# Patient Record
Sex: FEMALE | Race: WHITE | Employment: FULL TIME | URBAN - METROPOLITAN AREA
[De-identification: names, ages, dates, MRNs, and addresses within clinical notes are randomized per-mention and may not be internally consistent; named-entity substitution may affect disease eponyms.]

---

## 2017-03-28 ENCOUNTER — OFFICE VISIT (OUTPATIENT)
Dept: INTERNAL MEDICINE CLINIC | Age: 42
End: 2017-03-28

## 2017-03-28 VITALS
SYSTOLIC BLOOD PRESSURE: 133 MMHG | TEMPERATURE: 97.6 F | DIASTOLIC BLOOD PRESSURE: 78 MMHG | RESPIRATION RATE: 18 BRPM | WEIGHT: 277 LBS | HEART RATE: 73 BPM | BODY MASS INDEX: 41.03 KG/M2 | HEIGHT: 69 IN | OXYGEN SATURATION: 99 %

## 2017-03-28 DIAGNOSIS — K21.9 GASTROESOPHAGEAL REFLUX DISEASE WITHOUT ESOPHAGITIS: ICD-10-CM

## 2017-03-28 DIAGNOSIS — K52.9 COLITIS: ICD-10-CM

## 2017-03-28 DIAGNOSIS — E11.9 TYPE 2 DIABETES MELLITUS WITHOUT COMPLICATION, WITHOUT LONG-TERM CURRENT USE OF INSULIN (HCC): Primary | ICD-10-CM

## 2017-03-28 DIAGNOSIS — E04.2 MULTINODULAR GOITER: ICD-10-CM

## 2017-03-28 RX ORDER — FAMOTIDINE 20 MG/1
20 TABLET, FILM COATED ORAL 2 TIMES DAILY
Qty: 30 TAB | Refills: 5 | Status: SHIPPED | OUTPATIENT
Start: 2017-03-28 | End: 2017-04-12 | Stop reason: SDUPTHER

## 2017-03-28 RX ORDER — CETIRIZINE HCL 10 MG
TABLET ORAL
COMMUNITY

## 2017-03-28 NOTE — PROGRESS NOTES
HISTORY OF PRESENT ILLNESS  Mitzy Mcrae is a 42 y.o. female. HPI  Here for diabetes follow-up. She was seen x 2--last Aug 2016 with Dr. Helen Ray here. Labs from Aug 2016 visit not drawn. Last seen by Dr. Dimitri Barahona endocrine July 2016. She notes only seeing Dr. Dimitri Barahona for thyroid    She notes initially managed as Crohn's, but now listed as \"indeterminate colitis\". She had labs with GI in Jan 2017. No plan for upcoming labs with GI at this time. She notes no problems with follow-up here. Just \"tired of doctors visits\", but fine returning here for routine DM follow-up. ROS      Blood pressure 133/78, pulse 73, temperature 97.6 °F (36.4 °C), temperature source Oral, resp. rate 18, height 5' 9\" (1.753 m), weight 277 lb (125.6 kg), last menstrual period 03/07/2017, SpO2 99 %. Physical Exam   Constitutional: She appears well-developed and well-nourished. No distress. HENT:   Head: Normocephalic and atraumatic. Eyes: Conjunctivae are normal. Right eye exhibits no discharge. Left eye exhibits no discharge. No scleral icterus. Neck: Normal range of motion. Neck supple. No tracheal deviation present. No thyromegaly present. Cardiovascular: Normal rate, regular rhythm, normal heart sounds and intact distal pulses. Exam reveals no gallop and no friction rub. No murmur heard. Pulmonary/Chest: Effort normal and breath sounds normal. No stridor. No respiratory distress. She has no wheezes. She has no rales. She exhibits no tenderness. Abdominal: Soft. Bowel sounds are normal. She exhibits no distension. There is no tenderness. Musculoskeletal: She exhibits no edema, tenderness or deformity. Lymphadenopathy:     She has no cervical adenopathy. Neurological: She is alert. She exhibits normal muscle tone. Coordination normal.   Skin: Skin is warm. No rash noted. She is not diaphoretic. No erythema. No pallor. Psychiatric: She has a normal mood and affect.  Her behavior is normal. Judgment and thought content normal.     Diabetic foot exam:   Left: Filament test normal sensation with micro filament; 6/6. Pulse DP: 2+ (normal)   Pulse PT: 2+ (normal)   Deformities: None  Right: Filament test normal sensation with micro filament; 6/6. Pulse DP: 2+ (normal)   Pulse PT: 2+ (normal)   Deformities: None        ASSESSMENT and PLAN    ICD-10-CM ICD-9-CM    1. Type 2 diabetes mellitus without complication, without long-term current use of insulin (HCC) E11.9 250.00 CBC WITH AUTOMATED DIFF      METABOLIC PANEL, COMPREHENSIVE      LIPID PANEL      CK      MICROALBUMIN, UR, RAND W/ MICROALBUMIN/CREA RATIO      linagliptin (TRADJENTA) 5 mg tablet   2. Colitis K52.9 558.9    3. Multinodular goiter E04.2 241.1 US THYROID/PARATHYROID/SOFT TISS      TSH 3RD GENERATION      T4, FREE      T3 TOTAL   4. Gastroesophageal reflux disease without esophagitis K21.9 530.81 famotidine (PEPCID) 20 mg tablet       1. Fasting [facility] labs reviewed. 2.  Interim follow-up with GI--every 6mo now. Notes \"indeterminate colitis\", but managing as Crohn's. 3.  Repeat US recommended by Dr. Kinsey Duty for March 2017 at last visit. Repeat thyroid studies with labs above. 4.  Refill reviewed. Follow-up Disposition:  Return in about 3 months (around 6/28/2017) for Diabetes follow-up, lab review. lab results and schedule of future lab studies reviewed with patient  reviewed medications and side effects in detail  radiology results and schedule of future radiology studies reviewed with patient    For additional documentation of information and/or recommendations discussed this visit, please see notes in instructions. Plan and evaluation (above) reviewed with pt at visit  Patient voiced understanding of plan and provided with time to ask/review questions. After Visit Summary (AVS) provided to pt after visit with additional instructions as needed/reviewed.

## 2017-03-28 NOTE — PROGRESS NOTES
RM#17  Chief Complaint   Patient presents with    Diabetes     F/U   NON-FASTING AWARE that labs will go to [facility]   1. Have you been to the ER, urgent care clinic since your last visit? Hospitalized since your last visit? No    2. Have you seen or consulted any other health care providers outside of the [address] [place] [address] since your last visit? Include any pap smears or colon screening.  No  Health Maintenance Due   Topic Date Due    LIPID PANEL Q1  1975    FOOT EXAM Q1  10/20/1984    MICROALBUMIN Q1  10/20/1984    EYE EXAM RETINAL OR DILATED Q1  10/20/1984    PAP AKA CERVICAL CYTOLOGY  10/20/1995    HEMOGLOBIN A1C Q6M  02/02/2017     Hs had several iron infusions over the past couple months   PHQ 2 / 9, over the last two weeks 3/28/2017   Little interest or pleasure in doing things Not at all   Feeling down, depressed or hopeless Not at all   Total Score PHQ 2 0     Needs refills on trajenta, and famotidine

## 2017-03-28 NOTE — MR AVS SNAPSHOT
Visit Information Date & Time Provider Department Dept. Phone Encounter #  
 3/28/2017  8:15 AM Angelique Hernández, [address] [place] [address], [place] and Internal Medicine [phone] [id number] Follow-up Instructions Return in about 3 months (around 6/28/2017) for Diabetes follow-up, lab review. Upcoming Health Maintenance Date Due  
 LIPID PANEL Q1 1975 FOOT EXAM Q1 10/20/1984 MICROALBUMIN Q1 10/20/1984 EYE EXAM RETINAL OR DILATED Q1 10/20/1984 PAP AKA CERVICAL CYTOLOGY 10/20/1995 HEMOGLOBIN A1C Q6M 2/2/2017 Pneumococcal 19-64 Highest Risk (2 of 3 - PCV13) 8/2/2017 DTaP/Tdap/Td series (2 - Td) 8/2/2026 Allergies as of 3/28/2017  Review Complete On: 3/28/2017 By: Angelique Hernández MD  
 No Known Allergies Current Immunizations  Reviewed on 12/5/2016 Name Date Influenza Vaccine 10/7/2016 Pneumococcal Polysaccharide (PPSV-23) 8/2/2016 TB Skin Test (PPD) Intradermal 7/2/2016  8:25 PM  
 Tdap 8/2/2016 Not reviewed this visit You Were Diagnosed With   
  
 Codes Comments Type 2 diabetes mellitus without complication, without long-term current use of insulin (HCC)    -  Primary ICD-10-CM: E11.9 ICD-9-CM: 250.00 Colitis     ICD-10-CM: K52.9 ICD-9-CM: 558.9 Multinodular goiter     ICD-10-CM: E04.2 ICD-9-CM: 241.1 Gastroesophageal reflux disease without esophagitis     ICD-10-CM: K21.9 ICD-9-CM: 530.81 Vitals BP Pulse Temp Resp Height(growth percentile) Weight(growth percentile) 133/78 (BP 1 Location: Left arm, BP Patient Position: Sitting) 73 97.6 °F (36.4 °C) (Oral) 18 5' 9\" (1.753 m) 277 lb (125.6 kg) LMP SpO2 BMI OB Status Smoking Status 03/07/2017 99% 40.91 kg/m2 Having regular periods Never Smoker BMI and BSA Data Body Mass Index Body Surface Area 40.91 kg/m 2 2.47 m 2 Preferred Pharmacy Pharmacy Name Phone [address] [phone] Your Updated Medication List  
  
   
This list is accurate as of: 3/28/17  9:20 AM.  Always use your most recent med list.  
  
  
  
  
 azaTHIOprine 50 mg tablet Commonly known as:  IMURAN  
50 mg daily. cetirizine 10 mg tablet Commonly known as:  ZYRTEC Take  by mouth.  
  
 empagliflozin 25 mg tablet Commonly known as:  Loco Gi Take  by mouth daily. famotidine 20 mg tablet Commonly known as:  PEPCID Take 1 Tab by mouth two (2) times a day. Iron 325 mg (65 mg iron) tablet Generic drug:  ferrous sulfate Take 65 mg by mouth Daily (before breakfast). linagliptin 5 mg tablet Commonly known as:  Cindy Aydin Take 1 Tab by mouth daily. mesalamine 400 mg DR Cap capsule Commonly known as:  DELZICOL Take  by mouth. 2 caps 3x/day  
  
 multivitamins Chew Take  by mouth two (2) times a day. Prescriptions Sent to Pharmacy Refills  
 famotidine (PEPCID) 20 mg tablet 5 Sig: Take 1 Tab by mouth two (2) times a day. Class: Normal  
 Pharmacy: [address] Ph #: [phone] Route: Oral  
 linagliptin (TRADJENTA) 5 mg tablet 5 Sig: Take 1 Tab by mouth daily. Class: Normal  
 Pharmacy: [address] Ph #: [phone] Route: Oral  
  
We Performed the Following CBC WITH AUTOMATED DIFF [45149 CPT(R)] CK [id number] CPT(R)] LIPID PANEL [97315 CPT(R)] METABOLIC PANEL, COMPREHENSIVE [07901 CPT(R)] MICROALBUMIN, UR, RAND W/ MICROALBUMIN/CREA RATIO [id number] CPT(R)]   
 T3 TOTAL [id number] CPT(R)] T4, FREE [id number] CPT(R)] TSH 3RD GENERATION [34036 CPT(R)] Follow-up Instructions Return in about 3 months (around 6/28/2017) for Diabetes follow-up, lab review. To-Do List   
 04/04/2017 Imaging:  US THYROID/PARATHYROID/SOFT TISS Patient Instructions Please obtain your labs fasting at the [facility] closest to you, as reviewed. If you activate [employer], you can provide interim labs to your GI provider as needed. Will message Dr. Kaci Briggs with your labs and interim US report once you have completed. Introducing [facility] & HEALTH SERVICES! Isidra Rojo introduces [employer] patient portal. Now you can access parts of your medical record, email your doctor's office, and request medication refills online. 1. In your internet browser, go to [email or web address] 2. Click on the First Time User? Click Here link in the Sign In box. You will see the New Member Sign Up page. 3. Enter your [employer] Access Code exactly as it appears below. You will not need to use this code after youve completed the sign-up process. If you do not sign up before the expiration date, you must request a new code. · [employer] Access Code: [email or web address] Expires: 6/26/2017  9:19 AM 
 
4. Enter the last four digits of your Social Security Number (xxxx) and Date of Birth (mm/dd/yyyy) as indicated and click Submit. You will be taken to the next sign-up page. 5. Create a [employer] ID. This will be your [employer] login ID and cannot be changed, so think of one that is secure and easy to remember. 6. Create a [employer] password. You can change your password at any time. 7. Enter your Password Reset Question and Answer. This can be used at a later time if you forget your password. 8. Enter your e-mail address. You will receive e-mail notification when new information is available in [address] 9. Click Sign Up. You can now view and download portions of your medical record. 10. Click the Download Summary menu link to download a portable copy of your medical information.  
 
If you have questions, please visit the Frequently Asked Questions section of the [employer]. Remember, [employer] is NOT to be used for urgent needs. For medical emergencies, dial 911. Now available from your iPhone and Android! Please provide this summary of care documentation to your next provider. Your primary care clinician is listed as Mj Hanna. If you have any questions after today's visit, please call [phone].

## 2017-03-28 NOTE — PATIENT INSTRUCTIONS
Please obtain your labs fasting at the [facility] closest to you, as reviewed. If you activate MyChart, you can provide interim labs to your GI provider as needed. Will message Dr. Joan Yip with your labs and interim US report once you have completed.

## 2017-04-12 DIAGNOSIS — K21.9 GASTROESOPHAGEAL REFLUX DISEASE WITHOUT ESOPHAGITIS: ICD-10-CM

## 2017-04-12 NOTE — TELEPHONE ENCOUNTER
Per patient, she needs a quantity of 60 for the Famotidine 20 mg. Patient states she has to take two pills a day and she ran out of her prescription. Patient is aware that the provider attached refills but she wanted to let the nurse know about this and would like for this change to be made once she gets a new prescription. Patient would like a call back the nurse.     # [phone]

## 2017-04-12 NOTE — TELEPHONE ENCOUNTER
Rx was previously approved for 30 tabs 2 times daily. Verbal given to pharmacy for 60 tabs for 30 days with refill.

## 2017-04-15 RX ORDER — FAMOTIDINE 20 MG/1
20 TABLET, FILM COATED ORAL 2 TIMES DAILY
Qty: 60 TAB | Refills: 5 | OUTPATIENT
Start: 2017-04-15

## 2017-04-15 NOTE — TELEPHONE ENCOUNTER
Reviewed and agree. Goal to provide 30-day supply/fill (script). Order entered/corrected (but not sent to pharmacy, since already called in).

## 2017-04-29 LAB
ALBUMIN SERPL-MCNC: 4.4 G/DL (ref 3.5–5.5)
ALBUMIN/CREAT UR: 27.1 MG/G CREAT (ref 0–30)
ALBUMIN/GLOB SERPL: 1.6 {RATIO} (ref 1.2–2.2)
ALP SERPL-CCNC: 54 IU/L (ref 39–117)
ALT SERPL-CCNC: 8 IU/L (ref 0–32)
AST SERPL-CCNC: 13 IU/L (ref 0–40)
BASOPHILS # BLD AUTO: 0.1 X10E3/UL (ref 0–0.2)
BASOPHILS NFR BLD AUTO: 1 %
BILIRUB SERPL-MCNC: 0.6 MG/DL (ref 0–1.2)
BUN SERPL-MCNC: 18 MG/DL (ref 6–24)
BUN/CREAT SERPL: 26 (ref 9–23)
CALCIUM SERPL-MCNC: 9.1 MG/DL (ref 8.7–10.2)
CHLORIDE SERPL-SCNC: 96 MMOL/L (ref 96–106)
CHOLEST SERPL-MCNC: 167 MG/DL (ref 100–199)
CK SERPL-CCNC: 55 U/L (ref 24–173)
CO2 SERPL-SCNC: 22 MMOL/L (ref 18–29)
CREAT SERPL-MCNC: 0.68 MG/DL (ref 0.57–1)
CREAT UR-MCNC: 73.3 MG/DL
EOSINOPHIL # BLD AUTO: 0.2 X10E3/UL (ref 0–0.4)
EOSINOPHIL NFR BLD AUTO: 3 %
ERYTHROCYTE [DISTWIDTH] IN BLOOD BY AUTOMATED COUNT: 14.3 % (ref 12.3–15.4)
GLOBULIN SER CALC-MCNC: 2.7 G/DL (ref 1.5–4.5)
GLUCOSE SERPL-MCNC: 145 MG/DL (ref 65–99)
HCT VFR BLD AUTO: 44.6 % (ref 34–46.6)
HDLC SERPL-MCNC: 62 MG/DL
HGB BLD-MCNC: 14.7 G/DL (ref 11.1–15.9)
IMM GRANULOCYTES # BLD: 0 X10E3/UL (ref 0–0.1)
IMM GRANULOCYTES NFR BLD: 0 %
LDLC SERPL CALC-MCNC: 91 MG/DL (ref 0–99)
LYMPHOCYTES # BLD AUTO: 1.7 X10E3/UL (ref 0.7–3.1)
LYMPHOCYTES NFR BLD AUTO: 24 %
MCH RBC QN AUTO: 30.9 PG (ref 26.6–33)
MCHC RBC AUTO-ENTMCNC: 33 G/DL (ref 31.5–35.7)
MCV RBC AUTO: 94 FL (ref 79–97)
MICROALBUMIN UR-MCNC: 19.9 UG/ML
MONOCYTES # BLD AUTO: 0.6 X10E3/UL (ref 0.1–0.9)
MONOCYTES NFR BLD AUTO: 9 %
NEUTROPHILS # BLD AUTO: 4.5 X10E3/UL (ref 1.4–7)
NEUTROPHILS NFR BLD AUTO: 63 %
PLATELET # BLD AUTO: 264 X10E3/UL (ref 150–379)
POTASSIUM SERPL-SCNC: 4 MMOL/L (ref 3.5–5.2)
PROT SERPL-MCNC: 7.1 G/DL (ref 6–8.5)
RBC # BLD AUTO: 4.76 X10E6/UL (ref 3.77–5.28)
SODIUM SERPL-SCNC: 136 MMOL/L (ref 134–144)
T3 SERPL-MCNC: 103 NG/DL (ref 71–180)
T4 FREE SERPL-MCNC: 1.34 NG/DL (ref 0.82–1.77)
TRIGL SERPL-MCNC: 72 MG/DL (ref 0–149)
TSH SERPL DL<=0.005 MIU/L-ACNC: 1.27 UIU/ML (ref 0.45–4.5)
VLDLC SERPL CALC-MCNC: 14 MG/DL (ref 5–40)
WBC # BLD AUTO: 7.1 X10E3/UL (ref 3.4–10.8)

## 2017-07-11 ENCOUNTER — OFFICE VISIT (OUTPATIENT)
Dept: INTERNAL MEDICINE CLINIC | Age: 42
End: 2017-07-11

## 2017-07-11 VITALS
OXYGEN SATURATION: 97 % | SYSTOLIC BLOOD PRESSURE: 118 MMHG | HEIGHT: 69 IN | TEMPERATURE: 98.1 F | HEART RATE: 75 BPM | WEIGHT: 289.4 LBS | DIASTOLIC BLOOD PRESSURE: 67 MMHG | BODY MASS INDEX: 42.86 KG/M2 | RESPIRATION RATE: 16 BRPM

## 2017-07-11 DIAGNOSIS — E04.2 MULTINODULAR GOITER: ICD-10-CM

## 2017-07-11 DIAGNOSIS — E11.9 TYPE 2 DIABETES MELLITUS WITHOUT COMPLICATION, WITHOUT LONG-TERM CURRENT USE OF INSULIN (HCC): Primary | ICD-10-CM

## 2017-07-11 DIAGNOSIS — K52.3 COLITIS, INDETERMINATE: ICD-10-CM

## 2017-07-11 LAB — HBA1C MFR BLD HPLC: 7 % (ref 4.8–5.6)

## 2017-07-11 NOTE — MR AVS SNAPSHOT
Visit Information Date & Time Provider Department Dept. Phone Encounter #  
 7/11/2017  8:30 AM Kiana Pringle [address] and Internal Medicine [phone] [id number] Follow-up Instructions Return in about 3 months (around 10/11/2017) for Diabetes follow-up, fasting labs. Upcoming Health Maintenance Date Due  
 FOOT EXAM Q1 10/20/1984 EYE EXAM RETINAL OR DILATED Q1 10/20/1984 PAP AKA CERVICAL CYTOLOGY 10/20/1995 HEMOGLOBIN A1C Q6M 2/2/2017 INFLUENZA AGE 9 TO ADULT 8/1/2017 MICROALBUMIN Q1 4/28/2018 LIPID PANEL Q1 4/28/2018 DTaP/Tdap/Td series (2 - Td) 8/2/2026 Allergies as of 7/11/2017  Review Complete On: 7/11/2017 By: Rosy Renee MD  
 No Known Allergies Current Immunizations  Reviewed on 12/5/2016 Name Date Influenza Vaccine 10/7/2016 Pneumococcal Polysaccharide (PPSV-23) 8/2/2016 TB Skin Test (PPD) Intradermal 7/2/2016  8:25 PM  
 Tdap 8/2/2016 Not reviewed this visit You Were Diagnosed With   
  
 Codes Comments Type 2 diabetes mellitus without complication, without long-term current use of insulin (HCC)    -  Primary ICD-10-CM: E11.9 ICD-9-CM: 250.00 Colitis, indeterminate     ICD-10-CM: K52.3 ICD-9-CM: 558.9 Multinodular goiter     ICD-10-CM: E04.2 ICD-9-CM: 438. 1 Vitals BP Pulse Temp Resp Height(growth percentile) Weight(growth percentile)  
 118/67 (BP 1 Location: Left arm, BP Patient Position: Sitting) 75 98.1 °F (36.7 °C) (Oral) 16 5' 9\" (1.753 m) 289 lb 6.4 oz (131.3 kg) LMP SpO2 BMI OB Status Smoking Status 06/26/2017 97% 42.74 kg/m2 Having regular periods Never Smoker BMI and BSA Data Body Mass Index Body Surface Area 42.74 kg/m 2 2.53 m 2 Preferred Pharmacy Pharmacy Name Phone [address] [phone] Your Updated Medication List  
  
   
 This list is accurate as of: 7/11/17  9:53 AM.  Always use your most recent med list.  
  
  
  
  
 azaTHIOprine 50 mg tablet Commonly known as:  IMURAN  
50 mg daily. cetirizine 10 mg tablet Commonly known as:  ZYRTEC Take  by mouth.  
  
 empagliflozin 25 mg tablet Commonly known as:  Wilnette Elder Take 1 Tab by mouth daily. famotidine 20 mg tablet Commonly known as:  PEPCID Take 1 Tab by mouth two (2) times a day. Iron 325 mg (65 mg iron) tablet Generic drug:  ferrous sulfate Take 65 mg by mouth Daily (before breakfast). linagliptin 5 mg tablet Commonly known as:  Perfecto Oh Take 1 Tab by mouth daily. mesalamine 400 mg Cdti capsule Commonly known as:  DELZICOL Take  by mouth. 2 caps 3x/day  
  
 multivitamins Chew Take  by mouth two (2) times a day. Prescriptions Sent to Pharmacy Refills  
 empagliflozin (JARDIANCE) 25 mg tablet 5 Sig: Take 1 Tab by mouth daily. Class: Normal  
 Pharmacy: [address] Ph #: [phone] Route: Oral  
  
We Performed the Following AMB POC HEMOGLOBIN A1C [83786 CPT(R)] CBC WITH AUTOMATED DIFF [70389 CPT(R)] IRON PROFILE [id number] CPT(R)] METABOLIC PANEL, COMPREHENSIVE [65177 CPT(R)] REFERRAL TO GASTROENTEROLOGY [EDC66 Custom] Comments:  
 Please evaluate patient for colitis follow-up. Follow-up Instructions Return in about 3 months (around 10/11/2017) for Diabetes follow-up, fasting labs. Referral Information Referral ID Referred By Referred To  
  
 [id number] Meir Paz MD   
   [address] [place] [address] [address] [facility], [address] Phone: [phone] Fax: [phone] Visits Status Start Date End Date 1 New Request 7/11/17 7/11/18  If your referral has a status of pending review or denied, additional information will be sent to support the outcome of this decision. Patient Instructions Will clarify with Dr. Alber Fischer, need for follow-up and/or repeat thyroid ultrasound--messaged during visit today. Will fax results to Dr. Rossana Vences once they result. Please review statin use with GI when you see them again--considerind statins vs Zetia for your elev LDL and diabetes. Please call the [facility] to set up an appointment--you can do this directly, without a referral.  Their number is [phone]. A1c today at goal (printed on lab sheet also): 
 
Results for orders placed or performed in visit on 07/11/17 AMB POC HEMOGLOBIN A1C Result Value Ref Range Hemoglobin A1c (POC) 7.0 (A) 4.8 - 5.6 % Shannen Leach 9353 What is a living will? A living will is a legal form you use to write down the kind of care you want at the end of your life. It is used by the health professionals who will treat you if you aren't able to decide for yourself. If you put your wishes in writing, your loved ones and others will know what kind of care you want. They won't need to guess. This can ease your mind and be helpful to others. A living will is not the same as an estate or property will. An estate will explains what you want to happen with your money and property after you die. Is a living will a legal document? A living will is a legal document. Each state has its own laws about living can. If you move to another state, make sure that your living will is legal in the state where you now live. Or you might use a universal form that has been approved by many states. This kind of form can sometimes be completed and stored online. Your electronic copy will then be available wherever you have a connection to the Internet. In most cases, doctors will respect your wishes even if you have a form from a different state. · You don't need an  to complete a living will. But legal advice can be helpful if your state's laws are unclear, your health history is complicated, or your family can't agree on what should be in your living will. · You can change your living will at any time. Some people find that their wishes about end-of-life care change as their health changes. · In addition to making a living will, think about completing a medical power of  form. This form lets you name the person you want to make end-of-life treatment decisions for you (your \"health care agent\") if you're not able to. Many hospitals and nursing homes will give you the forms you need to complete a living will and a medical power of . · Your living will is used only if you can't make or communicate decisions for yourself anymore. If you become able to make decisions again, you can accept or refuse any treatment, no matter what you wrote in your living will. · Your state may offer an online registry. This is a place where you can store your living will online so the doctors and nurses who need to treat you can find it right away. What should you think about when creating a living will? Talk about your end-of-life wishes with your family members and your doctor. Let them know what you want. That way the people making decisions for you won't be surprised by your choices. Think about these questions as you make your living will: · Do you know enough about life support methods that might be used? If not, talk to your doctor so you know what might be done if you can't breathe on your own, your heart stops, or you're unable to swallow. · What things would you still want to be able to do after you receive life-support methods? Would you want to be able to walk? To speak? To eat on your own? To live without the help of machines? · If you have a choice, where do you want to be cared for? In your home? At a hospital or nursing home? · Do you want certain Episcopal practices performed if you become very ill? · If you have a choice at the end of your life, where would you prefer to die? At home? In a hospital or nursing home? Somewhere else? · Would you prefer to be buried or cremated? · Do you want your organs to be donated after you die? What should you do with your living will? · Make sure that your family members and your health care agent have copies of your living will. · Give your doctor a copy of your living will to keep in your medical record. If you have more than one doctor, make sure that each one has a copy. · You may want to put a copy of your living will where it can be easily found. Where can you learn more? Go to http://clara-alex.info/. Enter X104 in the search box to learn more about \"Learning About Living Perromisty. \" Current as of: August 8, 2016 Content Version: 11.3 © 1346-8593 [employer]. Care instructions adapted under license by [employer] (which disclaims liability or warranty for this information). If you have questions about a medical condition or this instruction, always ask your healthcare professional. [address] any warranty or liability for your use of this information. Introducing [facility] & HEALTH SERVICES! Lillian Laws introduces [employer] patient portal. Now you can access parts of your medical record, email your doctor's office, and request medication refills online. 1. In your internet browser, go to [email or web address] 2. Click on the First Time User? Click Here link in the Sign In box. You will see the New Member Sign Up page. 3. Enter your [employer] Access Code exactly as it appears below. You will not need to use this code after youve completed the sign-up process. If you do not sign up before the expiration date, you must request a new code.  
 
· [employer] Access Code: [email or web address] 
 Expires: 10/9/2017  8:09 AM 
 
4. Enter the last four digits of your Social Security Number (xxxx) and Date of Birth (mm/dd/yyyy) as indicated and click Submit. You will be taken to the next sign-up page. 5. Create a [employer] ID. This will be your [employer] login ID and cannot be changed, so think of one that is secure and easy to remember. 6. Create a [employer] password. You can change your password at any time. 7. Enter your Password Reset Question and Answer. This can be used at a later time if you forget your password. 8. Enter your e-mail address. You will receive e-mail notification when new information is available in [address] 9. Click Sign Up. You can now view and download portions of your medical record. 10. Click the Download Summary menu link to download a portable copy of your medical information. If you have questions, please visit the Frequently Asked Questions section of the [employer] website. Remember, [employer] is NOT to be used for urgent needs. For medical emergencies, dial 911. Now available from your iPhone and Android! Please provide this summary of care documentation to your next provider. Your primary care clinician is listed as [address]. If you have any questions after today's visit, please call [phone].

## 2017-07-11 NOTE — LETTER
2017 11:31 PM 
 
Ms. Mitra Nick [address] [facility] [address] :  1975 Fax to Dr. Jim Hernandez: Johana Stevens MD  Gastroenterology Phone: [phone]; Fax: [phone] Please see attached labs for pt follow-up appt with you. Please let me know if you have other questions.  
 
Lucia Tolliver MD

## 2017-07-11 NOTE — PROGRESS NOTES
RM 16    Chief Complaint   Patient presents with    Diabetes     follow up       1. Have you been to the ER, urgent care clinic since your last visit? Hospitalized since your last visit? No    2. Have you seen or consulted any other health care providers outside of the [address] [place] [address] since your last visit? Include any pap smears or colon screening.  No    Health Maintenance Due   Topic Date Due    FOOT EXAM Q1  10/20/1984    EYE EXAM RETINAL OR DILATED Q1  10/20/1984    PAP AKA CERVICAL CYTOLOGY  10/20/1995    HEMOGLOBIN A1C Q6M  02/02/2017     Informed pt of A1C   Pt states she had a foot exam done at last visit    Living Will sent to pt AVS

## 2017-07-11 NOTE — PATIENT INSTRUCTIONS
Will clarify with Dr. Shruthi Giordano, need for follow-up and/or repeat thyroid ultrasound--messaged during visit today. Will fax results to Dr. Teja Salazar once they result. Please review statin use with GI when you see them again--considerind statins vs Zetia for your elev LDL and diabetes. Please call the [facility] to set up an appointment--you can do this directly, without a referral.  Their number is [phone]. A1c today at goal (printed on lab sheet also):    Results for orders placed or performed in visit on 07/11/17   AMB POC HEMOGLOBIN A1C   Result Value Ref Range    Hemoglobin A1c (POC) 7.0 (A) 4.8 - 5.6 %         Learning About Living Shara Dates  What is a living will? A living will is a legal form you use to write down the kind of care you want at the end of your life. It is used by the health professionals who will treat you if you aren't able to decide for yourself. If you put your wishes in writing, your loved ones and others will know what kind of care you want. They won't need to guess. This can ease your mind and be helpful to others. A living will is not the same as an estate or property will. An estate will explains what you want to happen with your money and property after you die. Is a living will a legal document? A living will is a legal document. Each state has its own laws about living can. If you move to another state, make sure that your living will is legal in the state where you now live. Or you might use a universal form that has been approved by many states. This kind of form can sometimes be completed and stored online. Your electronic copy will then be available wherever you have a connection to the Internet. In most cases, doctors will respect your wishes even if you have a form from a different state. · You don't need an  to complete a living will.  But legal advice can be helpful if your state's laws are unclear, your health history is complicated, or your family can't agree on what should be in your living will. · You can change your living will at any time. Some people find that their wishes about end-of-life care change as their health changes. · In addition to making a living will, think about completing a medical power of  form. This form lets you name the person you want to make end-of-life treatment decisions for you (your \"health care agent\") if you're not able to. Many hospitals and nursing homes will give you the forms you need to complete a living will and a medical power of . · Your living will is used only if you can't make or communicate decisions for yourself anymore. If you become able to make decisions again, you can accept or refuse any treatment, no matter what you wrote in your living will. · Your state may offer an online registry. This is a place where you can store your living will online so the doctors and nurses who need to treat you can find it right away. What should you think about when creating a living will? Talk about your end-of-life wishes with your family members and your doctor. Let them know what you want. That way the people making decisions for you won't be surprised by your choices. Think about these questions as you make your living will:  · Do you know enough about life support methods that might be used? If not, talk to your doctor so you know what might be done if you can't breathe on your own, your heart stops, or you're unable to swallow. · What things would you still want to be able to do after you receive life-support methods? Would you want to be able to walk? To speak? To eat on your own? To live without the help of machines? · If you have a choice, where do you want to be cared for? In your home? At a hospital or nursing home? · Do you want certain Voodoo practices performed if you become very ill?   · If you have a choice at the end of your life, where would you prefer to die? At home? In a hospital or nursing home? Somewhere else? · Would you prefer to be buried or cremated? · Do you want your organs to be donated after you die? What should you do with your living will? · Make sure that your family members and your health care agent have copies of your living will. · Give your doctor a copy of your living will to keep in your medical record. If you have more than one doctor, make sure that each one has a copy. · You may want to put a copy of your living will where it can be easily found. Where can you learn more? Go to http://clara-alex.info/. Enter M574 in the search box to learn more about \"Learning About Living [place]. \"  Current as of: August 8, 2016  Content Version: 11.3  © 6882-1150 [employer]. Care instructions adapted under license by [employer] (which disclaims liability or warranty for this information). If you have questions about a medical condition or this instruction, always ask your healthcare professional. [place] [address] any warranty or liability for your use of this information.

## 2017-07-11 NOTE — PROGRESS NOTES
HISTORY OF PRESENT ILLNESS  Gail Solares is a 42 y.o. female. HPI      Here for evaluation:    Chief Complaint   Patient presents with    Diabetes     follow up       Reviewed prior labs with pt at visit. She has not done US or seen endocrine for follow-up. Last endocrine visit July 2016, with follow-up planned Oct 2016. She notes not planning repeat thyroid eval or studies. She would prefer not to do repeat US at this time unless endocrine recommends. She had prior eval prior to seeing Endocrine July 2016 (only one visit with Dr. Dianne Jacinto with [facility]), but will proceed as below. She notes no problems currently. She has seen GI earlier this year. Probably will see later this year/prob Aug-Sept.    Reviewed iron testing and coordination for pt with GI, since not seen and labs not monitored since last done here in Imuran and Mesalamine. She is not aware of need for inflammatory testing at this time (no need per pt for ESR or CRP). Reviewed statins or Zetia as lipid-lowering therapy to goal-directed value <70 or general recommendations for statin use with DM. She prefers to wait on therapy at this time, and re-address once fasting labs repeated. ROS      Blood pressure 118/67, pulse 75, temperature 98.1 °F (36.7 °C), temperature source Oral, resp. rate 16, height 5' 9\" (1.753 m), weight 289 lb 6.4 oz (131.3 kg), last menstrual period 06/26/2017, SpO2 97 %. Physical Exam   Constitutional: She appears well-developed and well-nourished. No distress. HENT:   Head: Normocephalic and atraumatic. Eyes: Conjunctivae are normal. Right eye exhibits no discharge. Left eye exhibits no discharge. No scleral icterus. Neck: Neck supple. Cardiovascular: Normal rate, regular rhythm, normal heart sounds and intact distal pulses. Exam reveals no gallop and no friction rub. No murmur heard. Pulmonary/Chest: Effort normal and breath sounds normal. No respiratory distress.  She has no wheezes. She has no rales. She exhibits no tenderness. Abdominal: Soft. Bowel sounds are normal. She exhibits no distension. There is no tenderness. Musculoskeletal: She exhibits edema (1+ bilat LE's to mid tibia. ). She exhibits no tenderness. Neurological: She is alert. She exhibits normal muscle tone. Coordination normal.   Skin: Skin is warm. No rash noted. She is not diaphoretic. No erythema. No pallor. Psychiatric: She has a normal mood and affect. Her behavior is normal. Judgment and thought content normal.       Results for orders placed or performed in visit on 07/11/17   AMB POC HEMOGLOBIN A1C   Result Value Ref Range    Hemoglobin A1c (POC) 7.0 (A) 4.8 - 5.6 %       ASSESSMENT and PLAN    ICD-10-CM ICD-9-CM    1. Type 2 diabetes mellitus without complication, without long-term current use of insulin (HCC) E11.9 250.00 AMB POC HEMOGLOBIN O6V      METABOLIC PANEL, COMPREHENSIVE      empagliflozin (JARDIANCE) 25 mg tablet   2. Colitis, indeterminate Q19.6 469.4 METABOLIC PANEL, COMPREHENSIVE      CBC WITH AUTOMATED DIFF      IRON PROFILE      REFERRAL TO GASTROENTEROLOGY      CANCELED: REFERRAL TO GASTROENTEROLOGY   3. Multinodular goiter E04.2 241.1        1. Results of A1c (improved) and goal for LDL reviewed with pt. Plan fasting labs at follow-up. 2.  Repeat labs and fax to GI tomorrow reviewed. 3.  Clarify need for US with endocrine--messaged during visit. Follow-up Disposition:  Return in about 3 months (around 10/11/2017) for Diabetes follow-up, fasting labs. lab results and schedule of future lab studies reviewed with patient  reviewed diet, exercise and weight control  reviewed medications and side effects in detail    For additional documentation of information and/or recommendations discussed this visit, please see notes in instructions.       Plan and evaluation (above) reviewed with pt at visit  Patient voiced understanding of plan and provided with time to ask/review questions. After Visit Summary (AVS) provided to pt after visit with additional instructions as needed/reviewed.

## 2017-07-12 LAB
ALBUMIN SERPL-MCNC: 5 G/DL (ref 3.5–5.5)
ALBUMIN/GLOB SERPL: 1.6 {RATIO} (ref 1.2–2.2)
ALP SERPL-CCNC: 64 IU/L (ref 39–117)
ALT SERPL-CCNC: 9 IU/L (ref 0–32)
AST SERPL-CCNC: 14 IU/L (ref 0–40)
BASOPHILS # BLD AUTO: 0 X10E3/UL (ref 0–0.2)
BASOPHILS NFR BLD AUTO: 0 %
BILIRUB SERPL-MCNC: 0.3 MG/DL (ref 0–1.2)
BUN SERPL-MCNC: 22 MG/DL (ref 6–24)
BUN/CREAT SERPL: 34 (ref 9–23)
CALCIUM SERPL-MCNC: 10.2 MG/DL (ref 8.7–10.2)
CHLORIDE SERPL-SCNC: 99 MMOL/L (ref 96–106)
CO2 SERPL-SCNC: 19 MMOL/L (ref 18–29)
CREAT SERPL-MCNC: 0.64 MG/DL (ref 0.57–1)
EOSINOPHIL # BLD AUTO: 0.2 X10E3/UL (ref 0–0.4)
EOSINOPHIL NFR BLD AUTO: 2 %
ERYTHROCYTE [DISTWIDTH] IN BLOOD BY AUTOMATED COUNT: 14.4 % (ref 12.3–15.4)
GLOBULIN SER CALC-MCNC: 3.2 G/DL (ref 1.5–4.5)
GLUCOSE SERPL-MCNC: 168 MG/DL (ref 65–99)
HCT VFR BLD AUTO: 45.8 % (ref 34–46.6)
HGB BLD-MCNC: 15.5 G/DL (ref 11.1–15.9)
IMM GRANULOCYTES # BLD: 0 X10E3/UL (ref 0–0.1)
IMM GRANULOCYTES NFR BLD: 0 %
IRON SATN MFR SERPL: 16 % (ref 15–55)
IRON SERPL-MCNC: 45 UG/DL (ref 27–159)
LYMPHOCYTES # BLD AUTO: 1.6 X10E3/UL (ref 0.7–3.1)
LYMPHOCYTES NFR BLD AUTO: 17 %
MCH RBC QN AUTO: 31 PG (ref 26.6–33)
MCHC RBC AUTO-ENTMCNC: 33.8 G/DL (ref 31.5–35.7)
MCV RBC AUTO: 92 FL (ref 79–97)
MONOCYTES # BLD AUTO: 0.6 X10E3/UL (ref 0.1–0.9)
MONOCYTES NFR BLD AUTO: 7 %
NEUTROPHILS # BLD AUTO: 6.6 X10E3/UL (ref 1.4–7)
NEUTROPHILS NFR BLD AUTO: 74 %
PLATELET # BLD AUTO: 257 X10E3/UL (ref 150–379)
POTASSIUM SERPL-SCNC: 4.8 MMOL/L (ref 3.5–5.2)
PROT SERPL-MCNC: 8.2 G/DL (ref 6–8.5)
RBC # BLD AUTO: 5 X10E6/UL (ref 3.77–5.28)
SODIUM SERPL-SCNC: 143 MMOL/L (ref 134–144)
TIBC SERPL-MCNC: 279 UG/DL (ref 250–450)
UIBC SERPL-MCNC: 234 UG/DL (ref 131–425)
WBC # BLD AUTO: 9 X10E3/UL (ref 3.4–10.8)

## 2017-09-21 DIAGNOSIS — E11.9 TYPE 2 DIABETES MELLITUS WITHOUT COMPLICATION, WITHOUT LONG-TERM CURRENT USE OF INSULIN (HCC): ICD-10-CM

## 2017-09-21 NOTE — LETTER
9/26/2017 8:48 AM 
 
Ms. Halina Yu [address] [facility] [id number] Please see attached lab results. --normal blood counts and iron studies --normal kidney and liver testing. Elevated non-fasting blood sugar/glucose. Please let me know if you have other questions.  
 
Noel Deutsch MD

## 2017-09-26 RX ORDER — LINAGLIPTIN 5 MG/1
TABLET, FILM COATED ORAL
Qty: 30 TAB | Refills: 5 | Status: SHIPPED | OUTPATIENT
Start: 2017-09-26 | End: 2018-09-13 | Stop reason: SDUPTHER

## 2017-09-26 NOTE — TELEPHONE ENCOUNTER
Letter to pt with lab results:    Normal H18 with normal iron studies. Elev non-fasting glucose, with normal kidney/liver testing. Refill request(s) approved--Tradjenta/linagliptin. No future appointments.

## 2018-01-10 ENCOUNTER — OFFICE VISIT (OUTPATIENT)
Dept: INTERNAL MEDICINE CLINIC | Age: 43
End: 2018-01-10

## 2018-01-10 VITALS
DIASTOLIC BLOOD PRESSURE: 90 MMHG | TEMPERATURE: 97.8 F | WEIGHT: 293 LBS | BODY MASS INDEX: 43.4 KG/M2 | RESPIRATION RATE: 16 BRPM | OXYGEN SATURATION: 99 % | SYSTOLIC BLOOD PRESSURE: 159 MMHG | HEIGHT: 69 IN | HEART RATE: 63 BPM

## 2018-01-10 DIAGNOSIS — E11.9 TYPE 2 DIABETES MELLITUS WITHOUT COMPLICATION, WITHOUT LONG-TERM CURRENT USE OF INSULIN (HCC): Primary | ICD-10-CM

## 2018-01-10 DIAGNOSIS — K50.90 CROHN'S DISEASE WITHOUT COMPLICATION, UNSPECIFIED GASTROINTESTINAL TRACT LOCATION (HCC): ICD-10-CM

## 2018-01-10 DIAGNOSIS — E04.2 MULTINODULAR GOITER: ICD-10-CM

## 2018-01-10 RX ORDER — MESALAMINE 0.38 G/1
1.5 CAPSULE, EXTENDED RELEASE ORAL DAILY
COMMUNITY

## 2018-01-10 NOTE — PROGRESS NOTES
History of Present Illness:   Mady Heredia is a 36 y.o. female here for evaluation:    Chief Complaint   Patient presents with   Dee Robertson regarding thyroid repeat US after July visit:    Te all under a CM and she has no concerning hx of high risk history. As long as she is not complaining of chocking or obstructhe nodules werive symptoms then she does not need another thyroid US.            Previous Messages       ----- Message -----      From: Brayden Gastelum MD      Sent: 7/11/2017   9:29 AM        To: Brayden Gastelum MD, Britney Rodriguez MD   Subject: Repeat thyroid US                                 Pt seen for follow-up here. She had normal thyroid studies April, but has not had repeat thyroid US as you recommended at your only visit with her July 2016. She prefers not to do repeat US or follow-up unless needed. Please let me know what you recommend for endocrine follow-up and need for repeat US. Thanks so much. Reviewed with pt at visit. No future appointments. Notes:  today for medication follow up and refills       She has no appt scheduled with endocrine. She has not seen GI for mgt of her Crohn's. Notes meds changed, but didn't see provider. She has not seen in just over one year. Notes not regularly checking blood sugars. Has not had symptomatic highs or lows. Past Medical History:   Diagnosis Date    Anemia     Bowel disease     Chrohn's    Diabetes ([facility] [address]     GERD (gastroesophageal reflux disease)         Prior to Admission medications    Medication Sig Start Date End Date Taking? Authorizing Provider   mesalamine ER (APRISO) 0.375 gram 24 hour capsule Take 1.5 g by mouth daily. Yes Historical Provider   TRADJENTA 5 mg tablet TAKE ONE TABLET BY MOUTH ONCE DAILY 9/26/17  Yes Brayden Gastelum MD   empagliflozin (JARDIANCE) 25 mg tablet Take 1 Tab by mouth daily.  7/11/17  Yes Lalo Driscoll Karine Herrera MD   famotidine (PEPCID) 20 mg tablet Take 1 Tab by mouth two (2) times a day. 4/15/17  Yes Gavino Liu MD   multivitamins chew Take  by mouth two (2) times a day. Yes Historical Provider   azaTHIOprine (IMURAN) 50 mg tablet 50 mg daily. 7/15/16  Yes Historical Provider   cetirizine (ZYRTEC) 10 mg tablet Take  by mouth. Historical Provider   ferrous sulfate (IRON) 325 mg (65 mg iron) tablet Take 65 mg by mouth Daily (before breakfast). Historical Provider        ROS    Vitals:    01/10/18 0911 01/10/18 0917   BP: 164/89 (!) 159/95   Pulse: 63    Resp: 16    Temp: 97.8 °F (36.6 °C)    TempSrc: Oral    SpO2: 99%    Weight: 299 lb (135.6 kg)    Height: 5' 9\" (1.753 m)    PainSc:   0 - No pain    LMP: 01/04/2017      Repeat BP as above. Physical Exam:     Physical Exam   Constitutional: She appears well-developed and well-nourished. No distress. HENT:   Head: Normocephalic and atraumatic. Eyes: Conjunctivae are normal. Right eye exhibits no discharge. Left eye exhibits no discharge. No scleral icterus. Neck: Neck supple. Cardiovascular: Normal rate, regular rhythm, normal heart sounds and intact distal pulses. Exam reveals no gallop and no friction rub. No murmur heard. Pulmonary/Chest: Effort normal and breath sounds normal. No respiratory distress. She has no wheezes. She has no rales. She exhibits no tenderness. Abdominal: Soft. Bowel sounds are normal. She exhibits no distension. There is no tenderness. Musculoskeletal: She exhibits no edema or tenderness. Neurological: She is alert. She exhibits normal muscle tone. Coordination normal.   Skin: Skin is warm. No rash noted. She is not diaphoretic. No erythema. No pallor. Psychiatric: She has a normal mood and affect. Her behavior is normal. Judgment and thought content normal.         Assessment and Plan:       ICD-10-CM ICD-9-CM    1.  Type 2 diabetes mellitus without complication, without long-term current use of insulin ([facility]) G17.6 898.56 METABOLIC PANEL, COMPREHENSIVE      LIPID PANEL      CK      HEMOGLOBIN A1C WITH EAG      dapagliflozin (FARXIGA) 5 mg tab tablet   2. Crohn's disease without complication, unspecified gastrointestinal tract location ([facility] K50.90 555.9 CBC WITH AUTOMATED DIFF      METABOLIC PANEL, COMPREHENSIVE   3. Multinodular goiter E04.2 241.1        1. Labs reviewed--plan follow-up here as reviewed. Prefers not to see endocrine. 2.  Reviewed follow-up with GI.    3.  No further imaging needed per endocrine review. Follow-up Disposition:  Return in about 3 months (around 4/10/2018) for Diabetes follow-up, non-fasting labs. lab results and schedule of future lab studies reviewed with patient  reviewed diet, exercise and weight control  reviewed medications and side effects in detail    For additional documentation of information and/or recommendations discussed this visit, please see notes in instructions. Plan and evaluation (above) reviewed with pt at visit  Patient voiced understanding of plan and provided with time to ask/review questions. After Visit Summary (AVS) provided to pt after visit with additional instructions as needed/reviewed.

## 2018-01-10 NOTE — PROGRESS NOTES
Rm 18    Chief Complaint   Patient presents with    Medication Evaluation       Patient presents today for medication follow up and refills    Health Maintenance Due   Topic Date Due    FOOT EXAM Q1  10/20/1984    EYE EXAM RETINAL OR DILATED Q1  10/20/1984    PAP AKA CERVICAL CYTOLOGY  10/20/1995    Influenza Age 9 to Adult  08/01/2017    HEMOGLOBIN A1C Q6M  01/11/2018     Patient is due for foot exam, microalbumin   , eye exams, pap and flu vaccine    1. Have you been to the ER, urgent care clinic since your last visit? Hospitalized since your last visit? No    2. Have you seen or consulted any other health care providers outside of the [address] [place] [address] since your last visit? Include any pap smears or colon screening.  No      Learning Assessment 6/27/2016   PRIMARY LEARNER Patient   HIGHEST LEVEL OF EDUCATION - PRIMARY LEARNER  SOME COLLEGE   BARRIERS PRIMARY LEARNER NONE   CO-LEARNER CAREGIVER No   PRIMARY LANGUAGE ENGLISH   LEARNER PREFERENCE PRIMARY DEMONSTRATION   ANSWERED BY Patient    RELATIONSHIP SELF     PHQ over the last two weeks 3/28/2017   Little interest or pleasure in doing things Not at all   Feeling down, depressed or hopeless Not at all   Total Score PHQ 2 0     Living medical will information given at previous visit

## 2018-01-10 NOTE — PATIENT INSTRUCTIONS
Once you change to the [place], you may need to increase to 2 of the 5mg tabs daily. This would be the higher equivalent dose to your current Jardiance. We can determine better based on your A1c today, once results. Please clarify with GI and schedule at least yearly follow-up with them for your Crohn's as reviewed today at visit. If they need labs coordinated with your visits here, please let us know. The next visit labs in 3mo will only be a finger-stick for your A1c after med change above.

## 2018-01-10 NOTE — MR AVS SNAPSHOT
Visit Information Date & Time Provider Department Dept. Phone Encounter #  
 1/10/2018  9:15 AM Dl Francis, [address] [place] [address] and Internal Medicine [id number] Follow-up Instructions Return in about 3 months (around 4/10/2018) for Diabetes follow-up, non-fasting labs. Upcoming Health Maintenance Date Due  
 FOOT EXAM Q1 10/20/1984 EYE EXAM RETINAL OR DILATED Q1 10/20/1984 PAP AKA CERVICAL CYTOLOGY 10/20/1995 Influenza Age 5 to Adult 8/1/2017 HEMOGLOBIN A1C Q6M 1/11/2018 MICROALBUMIN Q1 4/28/2018 LIPID PANEL Q1 4/28/2018 DTaP/Tdap/Td series (2 - Td) 8/2/2026 Allergies as of 1/10/2018  Review Complete On: 1/10/2018 By: Dl Francis MD  
 No Known Allergies Current Immunizations  Reviewed on 12/5/2016 Name Date Influenza Vaccine 10/7/2016 Pneumococcal Polysaccharide (PPSV-23) 8/2/2016 TB Skin Test (PPD) Intradermal 7/2/2016  8:25 PM  
 Tdap 8/2/2016 Not reviewed this visit You Were Diagnosed With   
  
 Codes Comments Type 2 diabetes mellitus without complication, without long-term current use of insulin (HCC)    -  Primary ICD-10-CM: E11.9 ICD-9-CM: 250.00 Crohn's disease without complication, unspecified gastrointestinal tract location [facility])     ICD-10-CM: K50.90 ICD-9-CM: 555.9 Multinodular goiter     ICD-10-CM: E04.2 ICD-9-CM: 023. 1 Vitals BP Pulse Temp Resp Height(growth percentile) Weight(growth percentile) (!) 159/95 (BP 1 Location: Right arm, BP Patient Position: Sitting) 63 97.8 °F (36.6 °C) (Oral) 16 5' 9\" (1.753 m) 299 lb (135.6 kg) LMP SpO2 BMI OB Status Smoking Status 01/04/2017 99% 44.15 kg/m2 Having regular periods Never Smoker Vitals History BMI and BSA Data Body Mass Index Body Surface Area  
 44.15 kg/m 2 2.57 m 2 Preferred Pharmacy Pharmacy Name Phone [address] [phone] Your Updated Medication List  
  
   
This list is accurate as of: 1/10/18 10:27 AM.  Always use your most recent med list.  
  
  
  
  
 APRISO 0.375 gram 24 hour capsule Generic drug:  mesalamine ER Take 1.5 g by mouth daily. azaTHIOprine 50 mg tablet Commonly known as:  IMURAN  
50 mg daily. cetirizine 10 mg tablet Commonly known as:  ZYRTEC Take  by mouth. dapagliflozin 5 mg Tab tablet Commonly known as:  [employer] Take 1 Tab by mouth daily. Take instead of Jardiance as reviewed (formulary change). famotidine 20 mg tablet Commonly known as:  PEPCID Take 1 Tab by mouth two (2) times a day. multivitamins Chew Take  by mouth two (2) times a day. TRADJENTA 5 mg tablet Generic drug:  linagliptin TAKE ONE TABLET BY MOUTH ONCE DAILY Prescriptions Sent to Pharmacy Refills  
 dapagliflozin (FARXIGA) 5 mg tab tablet 5 Sig: Take 1 Tab by mouth daily. Take instead of Jardiance as reviewed (formulary change). Class: Normal  
 Pharmacy: [facility] [address], [place] Ph #: [phone] Route: Oral  
  
We Performed the Following CBC WITH AUTOMATED DIFF [79241 CPT(R)] CK [id number] CPT(R)] HEMOGLOBIN A1C WITH EAG [21673 CPT(R)] LIPID PANEL [58778 CPT(R)] METABOLIC PANEL, COMPREHENSIVE [03595 CPT(R)] Follow-up Instructions Return in about 3 months (around 4/10/2018) for Diabetes follow-up, non-fasting labs. Patient Instructions Once you change to the [facility], you may need to increase to 2 of the 5mg tabs daily. This would be the higher equivalent dose to your current Jardiance. We can determine better based on your A1c today, once results. Please clarify with GI and schedule at least yearly follow-up with them for your Crohn's as reviewed today at visit. If they need labs coordinated with your visits here, please let us know. The next visit labs in 3mo will only be a finger-stick for your A1c after med change above. Introducing [facility] SERVICES! Dear Yonathan Saavedra: Thank you for requesting a [email or web address] account. Our records indicate that you already have an active [email or web address] account. You can access your account anytime at [email or web address] Did you know that you can access your hospital and ER discharge instructions at any time in [email or web address]? You can also review all of your test results from your hospital stay or ER visit. Additional Information If you have questions, please visit the Frequently Asked Questions section of the [email or web address] website at [email or web address] Remember, [email or web address] is NOT to be used for urgent needs. For medical emergencies, dial 911. Now available from your iPhone and Android! Please provide this summary of care documentation to your next provider. Your primary care clinician is listed as [address] [facility]. If you have any questions after today's visit, please call [phone].

## 2018-01-11 LAB
ALBUMIN SERPL-MCNC: 4.4 G/DL (ref 3.5–5.5)
ALBUMIN/GLOB SERPL: 1.4 {RATIO} (ref 1.2–2.2)
ALP SERPL-CCNC: 65 IU/L (ref 39–117)
ALT SERPL-CCNC: 11 IU/L (ref 0–32)
AST SERPL-CCNC: 18 IU/L (ref 0–40)
BASOPHILS # BLD AUTO: 0 X10E3/UL (ref 0–0.2)
BASOPHILS NFR BLD AUTO: 0 %
BILIRUB SERPL-MCNC: 0.4 MG/DL (ref 0–1.2)
BUN SERPL-MCNC: 16 MG/DL (ref 6–24)
BUN/CREAT SERPL: 27 (ref 9–23)
CALCIUM SERPL-MCNC: 9.5 MG/DL (ref 8.7–10.2)
CHLORIDE SERPL-SCNC: 100 MMOL/L (ref 96–106)
CHOLEST SERPL-MCNC: 174 MG/DL (ref 100–199)
CK SERPL-CCNC: 55 U/L (ref 24–173)
CO2 SERPL-SCNC: 25 MMOL/L (ref 18–29)
CREAT SERPL-MCNC: 0.6 MG/DL (ref 0.57–1)
EOSINOPHIL # BLD AUTO: 0.2 X10E3/UL (ref 0–0.4)
EOSINOPHIL NFR BLD AUTO: 2 %
ERYTHROCYTE [DISTWIDTH] IN BLOOD BY AUTOMATED COUNT: 14.4 % (ref 12.3–15.4)
EST. AVERAGE GLUCOSE BLD GHB EST-MCNC: 177 MG/DL
GLOBULIN SER CALC-MCNC: 3.1 G/DL (ref 1.5–4.5)
GLUCOSE SERPL-MCNC: 165 MG/DL (ref 65–99)
HBA1C MFR BLD: 7.8 % (ref 4.8–5.6)
HCT VFR BLD AUTO: 44 % (ref 34–46.6)
HDLC SERPL-MCNC: 64 MG/DL
HGB BLD-MCNC: 14.6 G/DL (ref 11.1–15.9)
IMM GRANULOCYTES # BLD: 0.1 X10E3/UL (ref 0–0.1)
IMM GRANULOCYTES NFR BLD: 1 %
LDLC SERPL CALC-MCNC: 93 MG/DL (ref 0–99)
LYMPHOCYTES # BLD AUTO: 1.7 X10E3/UL (ref 0.7–3.1)
LYMPHOCYTES NFR BLD AUTO: 20 %
MCH RBC QN AUTO: 29.9 PG (ref 26.6–33)
MCHC RBC AUTO-ENTMCNC: 33.2 G/DL (ref 31.5–35.7)
MCV RBC AUTO: 90 FL (ref 79–97)
MONOCYTES # BLD AUTO: 0.7 X10E3/UL (ref 0.1–0.9)
MONOCYTES NFR BLD AUTO: 8 %
NEUTROPHILS # BLD AUTO: 6 X10E3/UL (ref 1.4–7)
NEUTROPHILS NFR BLD AUTO: 69 %
PLATELET # BLD AUTO: 248 X10E3/UL (ref 150–379)
POTASSIUM SERPL-SCNC: 4.5 MMOL/L (ref 3.5–5.2)
PROT SERPL-MCNC: 7.5 G/DL (ref 6–8.5)
RBC # BLD AUTO: 4.88 X10E6/UL (ref 3.77–5.28)
SODIUM SERPL-SCNC: 142 MMOL/L (ref 134–144)
TRIGL SERPL-MCNC: 84 MG/DL (ref 0–149)
VLDLC SERPL CALC-MCNC: 17 MG/DL (ref 5–40)
WBC # BLD AUTO: 8.5 X10E3/UL (ref 3.4–10.8)

## 2018-01-24 DIAGNOSIS — E11.9 TYPE 2 DIABETES MELLITUS WITHOUT COMPLICATION, WITHOUT LONG-TERM CURRENT USE OF INSULIN (HCC): ICD-10-CM

## 2018-01-26 RX ORDER — LINAGLIPTIN 5 MG/1
TABLET, FILM COATED ORAL
Qty: 30 TAB | Refills: 5 | Status: SHIPPED | OUTPATIENT
Start: 2018-01-26 | End: 2018-09-10 | Stop reason: SDUPTHER

## 2018-09-21 ENCOUNTER — OFFICE VISIT (OUTPATIENT)
Dept: INTERNAL MEDICINE CLINIC | Age: 43
End: 2018-09-21

## 2018-09-21 VITALS
HEART RATE: 63 BPM | TEMPERATURE: 97.8 F | RESPIRATION RATE: 18 BRPM | OXYGEN SATURATION: 99 % | HEIGHT: 69 IN | SYSTOLIC BLOOD PRESSURE: 160 MMHG | DIASTOLIC BLOOD PRESSURE: 92 MMHG

## 2018-09-21 DIAGNOSIS — K52.3 COLITIS, INDETERMINATE: ICD-10-CM

## 2018-09-21 DIAGNOSIS — Z23 ENCOUNTER FOR IMMUNIZATION: ICD-10-CM

## 2018-09-21 DIAGNOSIS — E78.00 ELEVATED LDL CHOLESTEROL LEVEL: ICD-10-CM

## 2018-09-21 DIAGNOSIS — I10 ESSENTIAL HYPERTENSION: ICD-10-CM

## 2018-09-21 DIAGNOSIS — E11.9 TYPE 2 DIABETES MELLITUS WITHOUT COMPLICATION, WITHOUT LONG-TERM CURRENT USE OF INSULIN (HCC): Primary | ICD-10-CM

## 2018-09-21 LAB
ALBUMIN UR QL STRIP: 10 MG/L
CREATININE, URINE POC: 100 MG/DL
MICROALBUMIN/CREAT RATIO POC: <30 MG/G

## 2018-09-21 RX ORDER — LISINOPRIL 10 MG/1
10 TABLET ORAL DAILY
Qty: 30 TAB | Refills: 2 | Status: SHIPPED | OUTPATIENT
Start: 2018-09-21 | End: 2018-12-13 | Stop reason: SDUPTHER

## 2018-09-21 NOTE — PROGRESS NOTES
RM 17 Chief Complaint Patient presents with  Medication Evaluation  Labs Patient is fasting at this time. 1. Have you been to the ER, urgent care clinic since your last visit? Hospitalized since your last visit? No 
 
2. Have you seen or consulted any other health care providers outside of the [address] [place] [address] since your last visit? Include any pap smears or colon screening. No 
 
Health Maintenance Due Topic Date Due  
 FOOT EXAM Q1  10/20/1984  
 EYE EXAM RETINAL OR DILATED Q1  10/20/1984  PAP AKA CERVICAL CYTOLOGY  10/20/1995  MICROALBUMIN Q1  04/28/2018  HEMOGLOBIN A1C Q6M  07/10/2018  Influenza Age 5 to Adult  08/01/2018 PHQ over the last two weeks 9/21/2018 Little interest or pleasure in doing things Not at all Feeling down, depressed, irritable, or hopeless Not at all Total Score PHQ 2 0 Learning Assessment 9/21/2018 PRIMARY LEARNER Patient HIGHEST LEVEL OF EDUCATION - PRIMARY LEARNER  -  
BARRIERS PRIMARY LEARNER NONE  
CO-LEARNER CAREGIVER -  
PRIMARY LANGUAGE ENGLISH  
LEARNER PREFERENCE PRIMARY READING  
ANSWERED BY patient RELATIONSHIP SELF Immunization administered to right deltoid 9/21/2018 by Twila Stone LPN with patient's consent. Patient tolerated procedure well. No reactions noted.

## 2018-09-21 NOTE — PATIENT INSTRUCTIONS
1.  Please clarify with GI what their recommended schedule of folow-up is for disease and lab monitoring there. If they need us to do labs for them, please let us know and can coordinate with your labs here. We would typically do blood work every 6mo and finger stick (A1c only) labs every 3mo, for diabetes. 2.  If you develop non-productive cough (ACE-inhibitor cough) on lisinopril, can change to losartan 25mg once daily instead.

## 2018-09-21 NOTE — MR AVS SNAPSHOT
[address] [place] [address] NOLAN Gan [address] 
[phone] Patient: Jeovanny Mittal MRN: [id number] Visit Information Date & Time Provider Department Dept. Phone Encounter #  
 2018  8:15 AM Kate Matos, [address] [place] [address] and Internal Medicine [phone] Follow-up Instructions Return in about 4 weeks (around 10/19/2018) for Blood Pressure follow-up, non-fasting labs--4-6 weeks. Upcoming Health Maintenance Date Due  
 FOOT EXAM Q1 10/20/1984 EYE EXAM RETINAL OR DILATED Q1 10/20/1984 PAP AKA CERVICAL CYTOLOGY 10/20/1995 MICROALBUMIN Q1 2018 HEMOGLOBIN A1C Q6M 7/10/2018 Influenza Age 5 to Adult 2018 LIPID PANEL Q1 1/10/2019 DTaP/Tdap/Td series (2 - Td) 2026 Allergies as of 2018  Review Complete On: 2018 By: Kate Matos MD  
 No Known Allergies Current Immunizations  Reviewed on 2016 Name Date Influenza Vaccine 10/7/2016 Influenza Vaccine (Quad) PF  Incomplete Pneumococcal Polysaccharide (PPSV-23) 2016 TB Skin Test (PPD) Intradermal 2016  8:25 PM  
 Tdap 2016 Not reviewed this visit You Were Diagnosed With   
  
 Codes Comments Type 2 diabetes mellitus without complication, without long-term current use of insulin (HCC)    -  Primary ICD-10-CM: E11.9 ICD-9-CM: 250.00 Colitis, indeterminate     ICD-10-CM: K52.3 ICD-9-CM: 558.9 Elevated LDL cholesterol level     ICD-10-CM: E78.00 ICD-9-CM: 272.0 Essential hypertension     ICD-10-CM: I10 
ICD-9-CM: 401.9 Encounter for immunization     ICD-10-CM: B68 ICD-9-CM: V03.89 Vitals BP Pulse Temp Resp Height(growth percentile) LMP  
 (!) 160/92 (BP 1 Location: Left arm) 63 97.8 °F (36.6 °C) (Oral) 18 5' 9\" (1.753 m) 2018 SpO2 OB Status Smoking Status 99% Having regular periods Never Smoker Preferred Pharmacy Pharmacy Name Phone [address] [place] [address] [phone] Your Updated Medication List  
  
   
This list is accurate as of 9/21/18  9:29 AM.  Always use your most recent med list.  
  
  
  
  
 APRISO 0.375 gram 24 hour capsule Generic drug:  mesalamine ER Take 1.5 g by mouth daily. azaTHIOprine 50 mg tablet Commonly known as:  IMURAN  
50 mg daily. cetirizine 10 mg tablet Commonly known as:  ZYRTEC Take  by mouth daily as needed. famotidine 20 mg tablet Commonly known as:  PEPCID Take 1 Tab by mouth two (2) times a day. FARXIGA 5 mg Tab tablet Generic drug:  dapagliflozin TAKE ONE TABLET BY MOUTH ONCE DAILY TAKE INSTEAD OF JARDIANCE AS REVIEWED (FORMULARY CHANGE) lisinopril 10 mg tablet Commonly known as:  Lesia Loss Take 1 Tab by mouth daily. multivitamins Chew Take  by mouth two (2) times a day. TRADJENTA 5 mg tablet Generic drug:  linagliptin TAKE ONE TABLET BY MOUTH ONCE DAILY Prescriptions Sent to Pharmacy Refills  
 lisinopril (PRINIVIL, ZESTRIL) 10 mg tablet 2 Sig: Take 1 Tab by mouth daily. Class: Normal  
 Pharmacy: [facility] [address], [place] Ph #: [phone] Route: Oral  
  
We Performed the Following AMB POC URINE, MICROALBUMIN, SEMIQUANT (3 RESULTS) [19450 CPT(R)] CBC WITH AUTOMATED DIFF [29694 CPT(R)] CK [id number] CPT(R)] HEMOGLOBIN A1C WITH EAG [51661 CPT(R)]  DIABETES FOOT EXAM [7 Custom] INFLUENZA VIRUS VAC QUAD,SPLIT,PRESV FREE SYRINGE IM [id number] CPT(R)] LIPID PANEL [22605 CPT(R)] METABOLIC PANEL, COMPREHENSIVE [00847 CPT(R)] Follow-up Instructions Return in about 4 weeks (around 10/19/2018) for Blood Pressure follow-up, non-fasting labs--4-6 weeks. Patient Instructions 1. Please clarify with GI what their recommended schedule of folow-up is for disease and lab monitoring there. If they need us to do labs for them, please let us know and can coordinate with your labs here. We would typically do blood work every 6mo and finger stick (A1c only) labs every 3mo, for diabetes. 2.  If you develop non-productive cough (ACE-inhibitor cough) on lisinopril, can change to losartan 25mg once daily instead. Introducing [facility]! Dear Zenon Stephenson: Thank you for requesting a [employer] account. Our records indicate that you already have an active [employer] account. You can access your account anytime at [email or web address] Did you know that you can access your hospital and ER discharge instructions at any time in [employer]? You can also review all of your test results from your hospital stay or ER visit. Additional Information If you have questions, please visit the Frequently Asked Questions section of the [employer] website at [email or web address] Remember, [employer] is NOT to be used for urgent needs. For medical emergencies, dial 911. Now available from your iPhone and Android! Please provide this summary of care documentation to your next provider. Your primary care clinician is listed as [address]. If you have any questions after today's visit, please call [phone].

## 2018-09-21 NOTE — PROGRESS NOTES
History of Present Illness:  
Sabino Hernandez is a 36 y.o. female here for evaluation: Chief Complaint Patient presents with  Medication Evaluation  Labs Patient is fasting at this time. She has been checking HGM and numbers not alarming--she cannot remember, but has writing down. No log with pt at visit. Reviewed updating labs today and follow-up with GI. Notes no interim problems or concerns. Prior to Admission medications Medication Sig Start Date End Date Taking? Authorizing Provider TRADJENTA 5 mg tablet TAKE ONE TABLET BY MOUTH ONCE DAILY 9/13/18  Yes Silvia Monahan MD  
FARXIGA 5 mg tab tablet TAKE ONE TABLET BY MOUTH ONCE DAILY TAKE INSTEAD OF JARDIANCE AS REVIEWED (FORMULARY CHANGE) 7/4/18  Yes Silvia Monahan MD  
mesalamine ER (APRISO) 0.375 gram 24 hour capsule Take 1.5 g by mouth daily. Yes Historical Provider  
famotidine (PEPCID) 20 mg tablet Take 1 Tab by mouth two (2) times a day. 4/15/17  Yes Silvia Monahan MD  
cetirizine (ZYRTEC) 10 mg tablet Take  by mouth daily as needed. Yes Historical Provider  
multivitamins chew Take  by mouth two (2) times a day. Yes Historical Provider  
azaTHIOprine (IMURAN) 50 mg tablet 50 mg daily. 7/15/16  Yes Historical Provider ROS Vitals:  
 09/21/18 6165 BP: (!) 160/92 Pulse: 63 Resp: 18 Temp: 97.8 °F (36.6 °C) TempSrc: Oral  
SpO2: 99% Height: 5' 9\" (1.753 m) PainSc:   0 - No pain LMP: 09/14/2018 There is no height or weight on file to calculate BMI. Physical Exam:  
 
Physical Exam  
Constitutional: She appears well-developed and well-nourished. No distress. HENT:  
Head: Normocephalic and atraumatic. Eyes: Conjunctivae are normal. Right eye exhibits no discharge. Left eye exhibits no discharge. No scleral icterus. Neck: Neck supple.   
Cardiovascular: Normal rate, regular rhythm, normal heart sounds and intact distal pulses. Exam reveals no gallop and no friction rub. No murmur heard. Pulmonary/Chest: Effort normal and breath sounds normal. No respiratory distress. She has no wheezes. She has no rales. She exhibits no tenderness. Abdominal: Soft. Bowel sounds are normal. She exhibits no distension. There is no tenderness. Musculoskeletal: She exhibits no edema or tenderness. Neurological: She is alert. She exhibits normal muscle tone. Coordination normal.  
Skin: Skin is warm. No rash noted. She is not diaphoretic. No erythema. No pallor. Psychiatric: She has a normal mood and affect. Her behavior is normal. Judgment and thought content normal.  
 
 
Diabetic foot exam:  
Left Foot: 
 Visual Exam: normal  
 Pulse DP: 1+ (weak)--difficult to palpate PT also. Filament test: normal sensation Right Foot: 
 Visual Exam: normal  
 Pulse DP: 1+ (weak) --difficult to palpate PT also. Filament test: normal sensation AMB POC URINE, MICROALBUMIN, SEMIQUANT (3 RESULTS) Result Value Ref Range ALBUMIN, URINE POC 10 Negative mg/L  
 CREATININE, URINE  mg/dL Microalbumin/creat ratio (POC) <30 <30 MG/G Microalbumin not provided in AVS at visit, but available to pt in [address] Assessment and Plan: ICD-10-CM ICD-9-CM 1. Type 2 diabetes mellitus without complication, without long-term current use of insulin ([facility]) C25.0 550.09 METABOLIC PANEL, COMPREHENSIVE  
   HEMOGLOBIN A1C WITH EAG  
   LIPID PANEL  
   CK AMB POC URINE, MICROALBUMIN, SEMIQUANT (3 RESULTS)  DIABETES FOOT EXAM  
2. Colitis, indeterminate K52.3 558.9 CBC WITH AUTOMATED DIFF  
   METABOLIC PANEL, COMPREHENSIVE 3. Elevated LDL cholesterol level Z24.59 752.9 METABOLIC PANEL, COMPREHENSIVE  
   LIPID PANEL  
   CK 4. Essential hypertension I10 401.9 lisinopril (PRINIVIL, ZESTRIL) 10 mg tablet 5.  Encounter for immunization Z23 V03.89 INFLUENZA VIRUS VAC QUAD,SPLIT,PRESV FREE SYRINGE IM  
 
 
 Updated labs reviewed at visit. 5.  Lisinopril reviewed for BP/DM at visit. BP follow-up as below reviewed. Follow-up Disposition: 
Return in about 4 weeks (around 10/19/2018) for Blood Pressure follow-up, non-fasting labs--4-6 weeks. lab results and schedule of future lab studies reviewed with patient 
reviewed medications and side effects in detail For additional documentation of information and/or recommendations discussed this visit, please see notes in instructions. Plan and evaluation (above) reviewed with pt at visit Patient voiced understanding of plan and provided with time to ask/review questions. After Visit Summary (AVS) provided to pt after visit with additional instructions as needed/reviewed. For additional documentation of information and/or recommendations discussed this visit, please see notes in instructions. Plan and evaluation (above) reviewed with pt at visit Patient voiced understanding of plan and provided with time to ask/review questions. After Visit Summary (AVS) provided to pt after visit with additional instructions as needed/reviewed.

## 2018-09-22 LAB
ALBUMIN SERPL-MCNC: 4.5 G/DL (ref 3.5–5.5)
ALBUMIN/GLOB SERPL: 1.5 {RATIO} (ref 1.2–2.2)
ALP SERPL-CCNC: 58 IU/L (ref 39–117)
ALT SERPL-CCNC: 11 IU/L (ref 0–32)
AST SERPL-CCNC: 22 IU/L (ref 0–40)
BASOPHILS # BLD AUTO: 0 X10E3/UL (ref 0–0.2)
BASOPHILS NFR BLD AUTO: 1 %
BILIRUB SERPL-MCNC: 0.5 MG/DL (ref 0–1.2)
BUN SERPL-MCNC: 14 MG/DL (ref 6–24)
BUN/CREAT SERPL: 23 (ref 9–23)
CALCIUM SERPL-MCNC: 9.6 MG/DL (ref 8.7–10.2)
CHLORIDE SERPL-SCNC: 94 MMOL/L (ref 96–106)
CHOLEST SERPL-MCNC: 184 MG/DL (ref 100–199)
CK SERPL-CCNC: 60 U/L (ref 24–173)
CO2 SERPL-SCNC: 24 MMOL/L (ref 20–29)
CREAT SERPL-MCNC: 0.6 MG/DL (ref 0.57–1)
EOSINOPHIL # BLD AUTO: 0.1 X10E3/UL (ref 0–0.4)
EOSINOPHIL NFR BLD AUTO: 2 %
ERYTHROCYTE [DISTWIDTH] IN BLOOD BY AUTOMATED COUNT: 13.8 % (ref 12.3–15.4)
EST. AVERAGE GLUCOSE BLD GHB EST-MCNC: 212 MG/DL
GLOBULIN SER CALC-MCNC: 3 G/DL (ref 1.5–4.5)
GLUCOSE SERPL-MCNC: 178 MG/DL (ref 65–99)
HBA1C MFR BLD: 9 % (ref 4.8–5.6)
HCT VFR BLD AUTO: 43.5 % (ref 34–46.6)
HDLC SERPL-MCNC: 57 MG/DL
HGB BLD-MCNC: 14.5 G/DL (ref 11.1–15.9)
IMM GRANULOCYTES # BLD: 0 X10E3/UL (ref 0–0.1)
IMM GRANULOCYTES NFR BLD: 0 %
LDLC SERPL CALC-MCNC: 101 MG/DL (ref 0–99)
LYMPHOCYTES # BLD AUTO: 1.6 X10E3/UL (ref 0.7–3.1)
LYMPHOCYTES NFR BLD AUTO: 19 %
MCH RBC QN AUTO: 30.6 PG (ref 26.6–33)
MCHC RBC AUTO-ENTMCNC: 33.3 G/DL (ref 31.5–35.7)
MCV RBC AUTO: 92 FL (ref 79–97)
MONOCYTES # BLD AUTO: 0.8 X10E3/UL (ref 0.1–0.9)
MONOCYTES NFR BLD AUTO: 9 %
NEUTROPHILS # BLD AUTO: 5.8 X10E3/UL (ref 1.4–7)
NEUTROPHILS NFR BLD AUTO: 69 %
PLATELET # BLD AUTO: 254 X10E3/UL (ref 150–379)
POTASSIUM SERPL-SCNC: 4.1 MMOL/L (ref 3.5–5.2)
PROT SERPL-MCNC: 7.5 G/DL (ref 6–8.5)
RBC # BLD AUTO: 4.74 X10E6/UL (ref 3.77–5.28)
SODIUM SERPL-SCNC: 135 MMOL/L (ref 134–144)
TRIGL SERPL-MCNC: 128 MG/DL (ref 0–149)
VLDLC SERPL CALC-MCNC: 26 MG/DL (ref 5–40)
WBC # BLD AUTO: 8.3 X10E3/UL (ref 3.4–10.8)

## 2018-10-08 DIAGNOSIS — E11.9 TYPE 2 DIABETES MELLITUS WITHOUT COMPLICATION, WITHOUT LONG-TERM CURRENT USE OF INSULIN (HCC): ICD-10-CM

## 2018-10-09 RX ORDER — DAPAGLIFLOZIN 5 MG/1
TABLET, FILM COATED ORAL
Qty: 90 TAB | Refills: 1 | Status: SHIPPED | OUTPATIENT
Start: 2018-10-09 | End: 2019-03-27 | Stop reason: SDUPTHER

## 2018-10-10 NOTE — TELEPHONE ENCOUNTER
Refill request(s) approved--Farxiga 5mg--90-day supply with 1 refill(s). .    Reviewed in [address] message to pt that she could increase to 10mg daily. MyChart message to pt regarding last labs also.

## 2018-10-15 DIAGNOSIS — E11.9 TYPE 2 DIABETES MELLITUS WITHOUT COMPLICATION, WITHOUT LONG-TERM CURRENT USE OF INSULIN (HCC): ICD-10-CM

## 2018-10-24 RX ORDER — LINAGLIPTIN 5 MG/1
TABLET, FILM COATED ORAL
Qty: 30 TAB | Refills: 5 | Status: SHIPPED | OUTPATIENT
Start: 2018-10-24 | End: 2018-12-13 | Stop reason: CLARIF

## 2018-10-24 NOTE — TELEPHONE ENCOUNTER
Refill request(s) approved--Tradjenta (linagliptin). MyChart message to pt previously regarding mgt in elevation in A1c and other labs.

## 2018-12-13 ENCOUNTER — OFFICE VISIT (OUTPATIENT)
Dept: INTERNAL MEDICINE CLINIC | Age: 43
End: 2018-12-13

## 2018-12-13 VITALS
OXYGEN SATURATION: 8 % | BODY MASS INDEX: 44.15 KG/M2 | SYSTOLIC BLOOD PRESSURE: 134 MMHG | HEIGHT: 69 IN | HEART RATE: 64 BPM | DIASTOLIC BLOOD PRESSURE: 84 MMHG | RESPIRATION RATE: 18 BRPM | TEMPERATURE: 98.3 F

## 2018-12-13 DIAGNOSIS — I10 ESSENTIAL HYPERTENSION: ICD-10-CM

## 2018-12-13 DIAGNOSIS — E11.9 TYPE 2 DIABETES MELLITUS WITHOUT COMPLICATION, WITHOUT LONG-TERM CURRENT USE OF INSULIN (HCC): Primary | ICD-10-CM

## 2018-12-13 RX ORDER — LISINOPRIL 10 MG/1
10 TABLET ORAL DAILY
Qty: 90 TAB | Refills: 1 | Status: SHIPPED | OUTPATIENT
Start: 2018-12-13 | End: 2019-06-02 | Stop reason: SDUPTHER

## 2018-12-13 NOTE — PROGRESS NOTES
RM 16    Patient states insurance will not cover Tradjenta medication , covered alternative Januvia. Chief Complaint   Patient presents with    Medication Evaluation     followup     1. Have you been to the ER, urgent care clinic since your last visit? Hospitalized since your last visit? No    2. Have you seen or consulted any other health care providers outside of the [address] [place] [address] since your last visit? Include any pap smears or colon screening. No    Health Maintenance Due   Topic Date Due    EYE EXAM RETINAL OR DILATED  10/20/1984    PAP AKA CERVICAL CYTOLOGY  10/20/1995     Abuse Screening Questionnaire 12/13/2018   Do you ever feel afraid of your partner? N   Are you in a relationship with someone who physically or mentally threatens you? N   Is it safe for you to go home?  Y       PHQ over the last two weeks 12/13/2018   Little interest or pleasure in doing things Not at all   Feeling down, depressed, irritable, or hopeless Not at all   Total Score PHQ 2 0

## 2018-12-13 NOTE — PATIENT INSTRUCTIONS
Since no longer covered, start the Januvia (sitagliptin) instead of the Tradjenta (linagliptin) as reviewed.

## 2018-12-13 NOTE — PROGRESS NOTES
History of Present Illness:   Sean Bates is a 39 y.o. female here for evaluation:    Chief Complaint   Patient presents with    Medication Evaluation     followup       Has letter indicating need to change from [facility]) to [facility]. Reviewed dosing at visit. Plan start 50mg dose as reviewed. Reviewed lisinopril and BP control     No problems with cough noted. Prior to Admission medications    Medication Sig Start Date End Date Taking? Authorizing Provider   TRADJENTA 5 mg tablet TAKE 1 TABLET BY MOUTH ONCE DAILY 10/24/18  Yes Michael Arreola MD   FARXIGA 5 mg tab tablet TAKE 1 TABLET BY MOUTH ONCE DAILY (TAKE INSTEAD OF Shelah Kiang) 10/9/18  Yes Michael Arreola MD   lisinopril (PRINIVIL, ZESTRIL) 10 mg tablet Take 1 Tab by mouth daily. 9/21/18  Yes Michael Arreola MD   mesalamine ER (APRISO) 0.375 gram 24 hour capsule Take 1.5 g by mouth daily. Yes Provider, Historical   famotidine (PEPCID) 20 mg tablet Take 1 Tab by mouth two (2) times a day. 4/15/17  Yes Michael Arreola MD   cetirizine (ZYRTEC) 10 mg tablet Take  by mouth daily as needed. Yes Provider, Historical   multivitamins chew Take  by mouth two (2) times a day. Yes Provider, Historical   azaTHIOprine (IMURAN) 50 mg tablet 50 mg daily. 7/15/16  Yes Provider, Historical        ROS    Vitals:    12/13/18 1420   BP: 134/84   Pulse: 64   Resp: 18   Temp: 98.3 °F (36.8 °C)   TempSrc: Oral   SpO2: (!) 8%   Height: 5' 9\" (1.753 m)   PainSc:   0 - No pain   LMP: 12/12/2018      Body mass index is 44.15 kg/m². Physical Exam:     Physical Exam   Constitutional: She appears well-developed and well-nourished. No distress. HENT:   Head: Normocephalic and atraumatic. Eyes: Conjunctivae are normal. Right eye exhibits no discharge. Left eye exhibits no discharge. No scleral icterus. Neck: Neck supple. Cardiovascular: Normal rate, regular rhythm, normal heart sounds and intact distal pulses.  Exam reveals no gallop and no friction rub. No murmur heard. Pulmonary/Chest: Effort normal and breath sounds normal. No respiratory distress. She has no wheezes. She has no rales. She exhibits no tenderness. Abdominal: Soft. Bowel sounds are normal. She exhibits no distension. There is no tenderness. Musculoskeletal: She exhibits no edema or tenderness. Neurological: She is alert. She exhibits normal muscle tone. Coordination normal.   Skin: Skin is warm. No rash noted. She is not diaphoretic. No erythema. No pallor. Psychiatric: She has a normal mood and affect. Her behavior is normal. Judgment and thought content normal.       Assessment and Plan:       ICD-10-CM ICD-9-CM    1. Type 2 diabetes mellitus without complication, without long-term current use of insulin ([facility]) E11.9 250.00 SITagliptin (JANUVIA) 50 mg tablet      lisinopril (PRINIVIL, ZESTRIL) 10 mg tablet      HEMOGLOBIN A1C WITH EAG   2. Essential hypertension I10 401.9 lisinopril (PRINIVIL, ZESTRIL) 10 mg tablet      METABOLIC PANEL, BASIC       1. She prefers to defer lipid mgt with statin at this time. Reviewed LDL-goals and LDL reduction and plaque stabilization as benefits of statin therapy. Plan repeat lipids at follow-up and start rosuvastatin 10mg then if LDL still elevated. 2.  Continue lisinopril. Refill reviewed. Good BP control on current dose medication. Follow-up Disposition:  Return in about 3 months (around 3/13/2019) for Diabetes follow-up, medication follow-up, fasting labs. lab results and schedule of future lab studies reviewed with patient  reviewed diet, exercise and weight control  reviewed medications and side effects in detail    For additional documentation of information and/or recommendations discussed this visit, please see notes in instructions. Plan and evaluation (above) reviewed with pt at visit  Patient voiced understanding of plan and provided with time to ask/review questions.   After Visit Summary (AVS) provided to pt after visit with additional instructions as needed/reviewed.

## 2018-12-14 LAB
BUN SERPL-MCNC: 17 MG/DL (ref 6–24)
BUN/CREAT SERPL: 25 (ref 9–23)
CALCIUM SERPL-MCNC: 9.4 MG/DL (ref 8.7–10.2)
CHLORIDE SERPL-SCNC: 103 MMOL/L (ref 96–106)
CO2 SERPL-SCNC: 22 MMOL/L (ref 20–29)
CREAT SERPL-MCNC: 0.67 MG/DL (ref 0.57–1)
EST. AVERAGE GLUCOSE BLD GHB EST-MCNC: 217 MG/DL
GLUCOSE SERPL-MCNC: 182 MG/DL (ref 65–99)
HBA1C MFR BLD: 9.2 % (ref 4.8–5.6)
POTASSIUM SERPL-SCNC: 4.6 MMOL/L (ref 3.5–5.2)
SODIUM SERPL-SCNC: 141 MMOL/L (ref 134–144)

## 2019-03-27 DIAGNOSIS — E11.9 TYPE 2 DIABETES MELLITUS WITHOUT COMPLICATION, WITHOUT LONG-TERM CURRENT USE OF INSULIN (HCC): ICD-10-CM

## 2019-04-03 RX ORDER — DAPAGLIFLOZIN 5 MG/1
TABLET, FILM COATED ORAL
Qty: 90 TAB | Refills: 1 | Status: SHIPPED | OUTPATIENT
Start: 2019-04-03 | End: 2019-10-03 | Stop reason: SDUPTHER

## 2019-04-04 NOTE — TELEPHONE ENCOUNTER
Refill request(s) approved--Farxiga. No future appointments. Please have pt schedule follow-up appt.

## 2019-04-04 NOTE — TELEPHONE ENCOUNTER
Informed the pt that her medication was approved,and that she needed to make a follow-up appt. Pt state's that she will be going out of town and will callback to make an appt.

## 2019-06-02 DIAGNOSIS — E11.9 TYPE 2 DIABETES MELLITUS WITHOUT COMPLICATION, WITHOUT LONG-TERM CURRENT USE OF INSULIN (HCC): ICD-10-CM

## 2019-06-02 DIAGNOSIS — I10 ESSENTIAL HYPERTENSION: ICD-10-CM

## 2019-06-02 RX ORDER — LISINOPRIL 10 MG/1
TABLET ORAL
Qty: 90 TAB | Refills: 1 | Status: SHIPPED | OUTPATIENT
Start: 2019-06-02 | End: 2019-12-03 | Stop reason: SDUPTHER

## 2019-06-02 RX ORDER — SITAGLIPTIN 50 MG/1
TABLET, FILM COATED ORAL
Qty: 90 TAB | Refills: 1 | Status: SHIPPED | OUTPATIENT
Start: 2019-06-02 | End: 2019-12-03 | Stop reason: SDUPTHER

## 2019-06-03 NOTE — TELEPHONE ENCOUNTER
Refill request(s) approved--Januvia, lisinopril.     Future Appointments   Date Time Provider Maureen Broussard   7/2/2019  8:15 AM Aron Muller MD [address] [facility]

## 2019-07-02 ENCOUNTER — OFFICE VISIT (OUTPATIENT)
Dept: INTERNAL MEDICINE CLINIC | Age: 44
End: 2019-07-02

## 2019-07-02 VITALS
OXYGEN SATURATION: 100 % | BODY MASS INDEX: 44.15 KG/M2 | HEIGHT: 69 IN | RESPIRATION RATE: 16 BRPM | DIASTOLIC BLOOD PRESSURE: 83 MMHG | HEART RATE: 64 BPM | TEMPERATURE: 97.6 F | SYSTOLIC BLOOD PRESSURE: 126 MMHG

## 2019-07-02 DIAGNOSIS — E11.9 TYPE 2 DIABETES MELLITUS WITHOUT COMPLICATION, WITHOUT LONG-TERM CURRENT USE OF INSULIN (HCC): ICD-10-CM

## 2019-07-02 DIAGNOSIS — I10 ESSENTIAL HYPERTENSION: Primary | ICD-10-CM

## 2019-07-02 DIAGNOSIS — L65.9 HAIR LOSS: ICD-10-CM

## 2019-07-02 NOTE — PROGRESS NOTES
RM 17    Pt is fasting today      Chief Complaint   Patient presents with    Medication Evaluation     follow up       1. Have you been to the ER, urgent care clinic since your last visit? Hospitalized since your last visit? No    2. Have you seen or consulted any other health care providers outside of the [address] [place] [address] since your last visit? Include any pap smears or colon screening. No    Health Maintenance Due   Topic Date Due    EYE EXAM RETINAL OR DILATED  10/20/1984    PAP AKA CERVICAL CYTOLOGY  10/20/1995    HEMOGLOBIN A1C Q6M  06/13/2019     Pt aware of HM due.

## 2019-07-02 NOTE — PROGRESS NOTES
History of Present Illness:   Rigo Rojo is a 39 y.o. female here for evaluation:    Chief Complaint   Patient presents with    Medication Evaluation     follow up     Notes:  Pt is fasting today    Fasting labs reviewed. Had changed DM medication to Januvia 50mg last visit due to insurance request/coverage. She notes has eye coverage and has not updated eye exam this year. Plans to do so, and schedule. Notes feels well. Has not checked HGM--no symptoms of high/low blood sugars. 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 She just had with work recently. Notes this helped her with her own med compliance and medication understanding. She continues to work as  for [employer]. 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 Had worked with this in [address] prior. 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 Prior to Admission medications    Medication Sig Start Date End Date Taking? Authorizing Provider   lisinopril (PRINIVIL, ZESTRIL) 10 mg tablet TAKE 1 TABLET BY MOUTH ONCE DAILY 6/2/19  Yes Le Vallecillo MD   JANUVIA 50 mg tablet TAKE 1 TABLET BY MOUTH ONCE DAILY. TAKE ONCE DAILY INSTEAD OF TRADJENTA 6/2/19  Yes Le Vallecillo MD   FARXIGA 5 mg tab tablet TAKE 1 TABLET BY MOUTH ONCE DAILY (TAKE INSTEAD OF JARDIANCE) 4/3/19  Yes Le Vallecillo MD   mesalamine ER (APRISO) 0.375 gram 24 hour capsule Take 1.5 g by mouth daily. Yes Provider, Historical   famotidine (PEPCID) 20 mg tablet Take 1 Tab by mouth two (2) times a day. 4/15/17  Yes Le Vallecillo MD   cetirizine (ZYRTEC) 10 mg tablet Take  by mouth daily as needed. Yes Provider, Historical   multivitamins chew Take  by mouth two (2) times a day. Yes Provider, Historical   azaTHIOprine (IMURAN) 50 mg tablet 50 mg daily.  7/15/16  Yes Provider, Historical ROS    Vitals:    07/02/19 0823   BP: 126/83   Pulse: 64   Resp: 16   Temp: 97.6 °F (36.4 °C)   TempSrc: Oral   SpO2: 100%   Height: 5' 9\" (1.753 m)   PainSc:   0 - No pain   LMP: 07/01/2019      Body mass index is 44.15 kg/m². Physical Exam:     Physical Exam   Constitutional: She appears well-developed and well-nourished. No distress. HENT:   Head: Normocephalic and atraumatic. Eyes: Conjunctivae are normal. Right eye exhibits no discharge. Left eye exhibits no discharge. No scleral icterus. Neck: Normal range of motion. Neck supple. No tracheal deviation present. No thyromegaly present. Cardiovascular: Normal rate, regular rhythm, normal heart sounds and intact distal pulses. Exam reveals no gallop and no friction rub. No murmur heard. Pulmonary/Chest: Effort normal and breath sounds normal. No stridor. No respiratory distress. She has no wheezes. She has no rales. She exhibits no tenderness. Abdominal: Soft. Bowel sounds are normal. She exhibits no distension. There is no tenderness. Musculoskeletal: She exhibits no edema or tenderness. Lymphadenopathy:     She has no cervical adenopathy. Neurological: She is alert. She exhibits normal muscle tone. Coordination normal.   Skin: Skin is warm. No rash noted. She is not diaphoretic. No erythema. No pallor. Psychiatric: She has a normal mood and affect. Her behavior is normal. Judgment and thought content normal.       Assessment and Plan:       ICD-10-CM ICD-9-CM    1. Essential hypertension I10 401.9 CBC WITH AUTOMATED DIFF      METABOLIC PANEL, COMPREHENSIVE   2. Type 2 diabetes mellitus without complication, without long-term current use of insulin ([facility]) J16.0 564.22 METABOLIC PANEL, COMPREHENSIVE      LIPID PANEL      CK      HEMOGLOBIN A1C WITH EAG   3. Hair loss L65.9 704.00 TSH AND FREE T4      FERRITIN      IRON PROFILE       1,2:  Updated fasting labs reviewed.     She is at end of 50mg Januvia bottle (30-day supply)--reviewed and she is OK to increase to 100mg daily if needed for DM mgt/elevated A1c. She is interested in adding statin as rosuvastatin if LDL still elevated. She prefers LDL goal-based therapy to minimize statin dose if possible. Reviewed 4-6 week non-fasting labs if starts statin and coordination labs once LDL resulted. Plan add as 10mg dose as reviewed previously. 3.  Pt requested labs for evaluation above. Has noted more hair loss recently, and more fatigue over weekend. Follow-up and Dispositions    · Return in about 3 months (around 10/2/2019) for Diabetes follow-up--3-4mo. lab results and schedule of future lab studies reviewed with patient  reviewed medications and side effects in detail    For additional documentation of information and/or recommendations discussed this visit, please see notes in instructions. Plan and evaluation (above) reviewed with pt at visit  Patient voiced understanding of plan and provided with time to ask/review questions. After Visit Summary (AVS) provided to pt after visit with additional instructions as needed/reviewed.

## 2019-07-03 LAB
ALBUMIN SERPL-MCNC: 4.2 G/DL (ref 3.5–5.5)
ALBUMIN/GLOB SERPL: 1.4 {RATIO} (ref 1.2–2.2)
ALP SERPL-CCNC: 55 IU/L (ref 39–117)
ALT SERPL-CCNC: 10 IU/L (ref 0–32)
AST SERPL-CCNC: 15 IU/L (ref 0–40)
BASOPHILS # BLD AUTO: 0.1 X10E3/UL (ref 0–0.2)
BASOPHILS NFR BLD AUTO: 1 %
BILIRUB SERPL-MCNC: 0.4 MG/DL (ref 0–1.2)
BUN SERPL-MCNC: 15 MG/DL (ref 6–24)
BUN/CREAT SERPL: 19 (ref 9–23)
CALCIUM SERPL-MCNC: 9.3 MG/DL (ref 8.7–10.2)
CHLORIDE SERPL-SCNC: 99 MMOL/L (ref 96–106)
CHOLEST SERPL-MCNC: 166 MG/DL (ref 100–199)
CK SERPL-CCNC: 47 U/L (ref 24–173)
CO2 SERPL-SCNC: 23 MMOL/L (ref 20–29)
CREAT SERPL-MCNC: 0.79 MG/DL (ref 0.57–1)
EOSINOPHIL # BLD AUTO: 0.2 X10E3/UL (ref 0–0.4)
EOSINOPHIL NFR BLD AUTO: 2 %
ERYTHROCYTE [DISTWIDTH] IN BLOOD BY AUTOMATED COUNT: 13.4 % (ref 12.3–15.4)
EST. AVERAGE GLUCOSE BLD GHB EST-MCNC: 237 MG/DL
FERRITIN SERPL-MCNC: 119 NG/ML (ref 15–150)
GLOBULIN SER CALC-MCNC: 2.9 G/DL (ref 1.5–4.5)
GLUCOSE SERPL-MCNC: 200 MG/DL (ref 65–99)
HBA1C MFR BLD: 9.9 % (ref 4.8–5.6)
HCT VFR BLD AUTO: 41.2 % (ref 34–46.6)
HDLC SERPL-MCNC: 47 MG/DL
HGB BLD-MCNC: 13.3 G/DL (ref 11.1–15.9)
IMM GRANULOCYTES # BLD AUTO: 0 X10E3/UL (ref 0–0.1)
IMM GRANULOCYTES NFR BLD AUTO: 1 %
IRON SATN MFR SERPL: 30 % (ref 15–55)
IRON SERPL-MCNC: 67 UG/DL (ref 27–159)
LDLC SERPL CALC-MCNC: 96 MG/DL (ref 0–99)
LYMPHOCYTES # BLD AUTO: 1.2 X10E3/UL (ref 0.7–3.1)
LYMPHOCYTES NFR BLD AUTO: 16 %
MCH RBC QN AUTO: 29.8 PG (ref 26.6–33)
MCHC RBC AUTO-ENTMCNC: 32.3 G/DL (ref 31.5–35.7)
MCV RBC AUTO: 92 FL (ref 79–97)
MONOCYTES # BLD AUTO: 0.6 X10E3/UL (ref 0.1–0.9)
MONOCYTES NFR BLD AUTO: 8 %
NEUTROPHILS # BLD AUTO: 5.7 X10E3/UL (ref 1.4–7)
NEUTROPHILS NFR BLD AUTO: 72 %
PLATELET # BLD AUTO: 226 X10E3/UL (ref 150–450)
POTASSIUM SERPL-SCNC: 4.4 MMOL/L (ref 3.5–5.2)
PROT SERPL-MCNC: 7.1 G/DL (ref 6–8.5)
RBC # BLD AUTO: 4.47 X10E6/UL (ref 3.77–5.28)
SODIUM SERPL-SCNC: 138 MMOL/L (ref 134–144)
T4 FREE SERPL-MCNC: 1.28 NG/DL (ref 0.82–1.77)
TIBC SERPL-MCNC: 221 UG/DL (ref 250–450)
TRIGL SERPL-MCNC: 114 MG/DL (ref 0–149)
TSH SERPL DL<=0.005 MIU/L-ACNC: 1.23 UIU/ML (ref 0.45–4.5)
UIBC SERPL-MCNC: 154 UG/DL (ref 131–425)
VLDLC SERPL CALC-MCNC: 23 MG/DL (ref 5–40)
WBC # BLD AUTO: 7.8 X10E3/UL (ref 3.4–10.8)

## 2019-10-03 DIAGNOSIS — E11.9 TYPE 2 DIABETES MELLITUS WITHOUT COMPLICATION, WITHOUT LONG-TERM CURRENT USE OF INSULIN (HCC): ICD-10-CM

## 2019-10-05 RX ORDER — DAPAGLIFLOZIN 5 MG/1
TABLET, FILM COATED ORAL
Qty: 90 TAB | Refills: 1 | Status: SHIPPED | OUTPATIENT
Start: 2019-10-05 | End: 2020-01-07 | Stop reason: SDUPTHER

## 2019-12-03 DIAGNOSIS — I10 ESSENTIAL HYPERTENSION: ICD-10-CM

## 2019-12-03 DIAGNOSIS — E11.9 TYPE 2 DIABETES MELLITUS WITHOUT COMPLICATION, WITHOUT LONG-TERM CURRENT USE OF INSULIN (HCC): ICD-10-CM

## 2019-12-03 RX ORDER — LISINOPRIL 10 MG/1
TABLET ORAL
Qty: 90 TAB | Refills: 1 | Status: SHIPPED | OUTPATIENT
Start: 2019-12-03 | End: 2020-05-30

## 2019-12-03 RX ORDER — SITAGLIPTIN 50 MG/1
TABLET, FILM COATED ORAL
Qty: 90 TAB | Refills: 1 | Status: SHIPPED | OUTPATIENT
Start: 2019-12-03 | End: 2020-01-07 | Stop reason: SDUPTHER

## 2019-12-03 NOTE — TELEPHONE ENCOUNTER
Refill request(s) approved--Januvia, lisinopril.     Future Appointments   Date Time Provider Maureen Aarti   1/7/2020  8:00 AM Alexsandra Viera MD [address] [facility]

## 2020-01-07 ENCOUNTER — OFFICE VISIT (OUTPATIENT)
Dept: INTERNAL MEDICINE CLINIC | Age: 45
End: 2020-01-07

## 2020-01-07 VITALS
HEART RATE: 89 BPM | OXYGEN SATURATION: 96 % | SYSTOLIC BLOOD PRESSURE: 118 MMHG | TEMPERATURE: 97.7 F | RESPIRATION RATE: 16 BRPM | DIASTOLIC BLOOD PRESSURE: 80 MMHG | BODY MASS INDEX: 44.15 KG/M2 | HEIGHT: 69 IN

## 2020-01-07 DIAGNOSIS — E78.00 ELEVATED LDL CHOLESTEROL LEVEL: ICD-10-CM

## 2020-01-07 DIAGNOSIS — E11.9 TYPE 2 DIABETES MELLITUS WITHOUT COMPLICATION, WITHOUT LONG-TERM CURRENT USE OF INSULIN (HCC): ICD-10-CM

## 2020-01-07 DIAGNOSIS — I10 ESSENTIAL HYPERTENSION: Primary | ICD-10-CM

## 2020-01-07 NOTE — PATIENT INSTRUCTIONS
Prior to follow-up, if you would check 3-5 morning fasting blood sugars and 3-5 bedtime (2-3 hrs after evening meal) blood sugars, this can help us with next medication adjustment (adding metformin XR, as reviewed).

## 2020-01-07 NOTE — PROGRESS NOTES
RM 16    Patient had flu vaccine through pharmacy. Chief Complaint   Patient presents with    Medication Evaluation    Labs     Patient fasting     1. Have you been to the ER, urgent care clinic since your last visit? Hospitalized since your last visit? No    2. Have you seen or consulted any other health care providers outside of the [address] [place] [address] since your last visit? Include any pap smears or colon screening. No    Health Maintenance Due   Topic Date Due    EYE EXAM RETINAL OR DILATED  10/20/1984    PAP AKA CERVICAL CYTOLOGY  10/20/1995    Influenza Age 9 to Adult  08/01/2019    FOOT EXAM Q1  09/21/2019    MICROALBUMIN Q1  09/21/2019    HEMOGLOBIN A1C Q6M  01/02/2020     Abuse Screening Questionnaire 1/7/2020   Do you ever feel afraid of your partner? N   Are you in a relationship with someone who physically or mentally threatens you? N   Is it safe for you to go home?  Y     3 most recent PHQ Screens 1/7/2020   Little interest or pleasure in doing things Not at all   Feeling down, depressed, irritable, or hopeless Not at all   Total Score PHQ 2 0     Learning Assessment 1/7/2020   PRIMARY LEARNER Patient   HIGHEST LEVEL OF EDUCATION - PRIMARY LEARNER  -   BARRIERS PRIMARY LEARNER NONE   CO-LEARNER CAREGIVER -   PRIMARY LANGUAGE ENGLISH   LEARNER PREFERENCE PRIMARY READING   ANSWERED BY patient   RELATIONSHIP SELF

## 2020-01-07 NOTE — PROGRESS NOTES
History of Present Illness:   Austyn Felton is a 38 y.o. female here for evaluation:    Chief Complaint   Patient presents with    Medication Evaluation    Labs     Patient fasting       Notes (nursing/rooming note copied below in italics):  Patient had flu vaccine through pharmacy.         She is taking meds regularly. She is agreeable to add or increase meds as below. Reviewed at visit:  --increase in Januvia to 100mg daily. -increaes in [place] to 10mg daily. She is working with diet and tolerance with Crohn's to address diet as best she can. She is interested in re-starting metformin also once above changes made and response evaluated--at 3mo follow-up. She is scheduling after visit today. She had good holidays. Traveled, and had family come in also. They also have recently travelled to [place], visiting [place] and [place]. Nursing screenings reviewed by provider at visit. Prior to Admission medications    Medication Sig Start Date End Date Taking? Authorizing Provider   lisinopril (PRINIVIL, ZESTRIL) 10 mg tablet TAKE 1 TABLET BY MOUTH ONCE DAILY 12/3/19  Yes Venkatesh Philip MD   JANUVIA 50 mg tablet TAKE 1 TABLET BY MOUTH ONCE DAILY. TAKE ONCE DAILY INSTEAD OF TRADJENTA. 12/3/19  Yes Venkatesh Philip MD   FARXIGA 5 mg tab tablet TAKE 1 TABLET BY MOUTH ONCE DAILY (TAKE INSTEAD OF JARDIANCE) 10/5/19  Yes Venkatesh Philip MD   mesalamine ER (APRISO) 0.375 gram 24 hour capsule Take 1.5 g by mouth daily. Yes Provider, Historical   famotidine (PEPCID) 20 mg tablet Take 1 Tab by mouth two (2) times a day. 4/15/17  Yes Venkatesh Philip MD   cetirizine (ZYRTEC) 10 mg tablet Take  by mouth daily as needed. Yes Provider, Historical   multivitamins chew Take  by mouth daily. Yes Provider, Historical   azaTHIOprine (IMURAN) 50 mg tablet 50 mg daily.  7/15/16  Yes Provider, Historical        ROS    Vitals:    01/07/20 0817 01/07/20 0902   BP: 140/74 118/80 Pulse: (!) 56 89   Resp: 16    Temp: 97.7 °F (36.5 °C)    TempSrc: Oral    SpO2: 96%    Height: 5' 9\" (1.753 m)    PainSc:   0 - No pain    LMP: 01/06/2020      Body mass index is 44.15 kg/m². Physical Exam:     Physical Exam  Vitals signs and nursing note reviewed. Constitutional:       General: She is not in acute distress. Appearance: Normal appearance. She is well-developed. She is not diaphoretic. HENT:      Head: Normocephalic and atraumatic. Mouth/Throat:      Mouth: Mucous membranes are moist.   Eyes:      General: No scleral icterus. Right eye: No discharge. Left eye: No discharge. Conjunctiva/sclera: Conjunctivae normal.   Cardiovascular:      Rate and Rhythm: Normal rate and regular rhythm. Pulses: Normal pulses. Heart sounds: Normal heart sounds. No murmur. No friction rub. No gallop. Pulmonary:      Effort: Pulmonary effort is normal. No respiratory distress. Breath sounds: Normal breath sounds. No stridor. No wheezing or rhonchi. Abdominal:      General: Bowel sounds are normal.      Palpations: Abdomen is soft. Tenderness: There is no tenderness. Musculoskeletal:         General: No swelling, tenderness, deformity or signs of injury. Right lower leg: No edema. Left lower leg: No edema. Skin:     General: Skin is warm. Coloration: Skin is not jaundiced or pale. Findings: No bruising, erythema or rash. Neurological:      General: No focal deficit present. Mental Status: She is alert. Motor: No abnormal muscle tone. Coordination: Coordination normal.      Gait: Gait normal.   Psychiatric:         Mood and Affect: Mood normal.         Behavior: Behavior normal.         Thought Content: Thought content normal.         Judgment: Judgment normal.     Diabetic foot exam:   Left Foot:   Visual Exam: normal --slight dry skin distal great toes   Pulse DP: 2+ (normal)--difficult to palpate.    Filament test: normal sensation   Right Foot:   Visual Exam: normal  --slight dry skin distal great toes   Pulse DP: 2+ (normal)--difficult to palpate   Filament test: normal sensation       Assessment and Plan:       ICD-10-CM ICD-9-CM    1. Essential hypertension W31 968.7 METABOLIC PANEL, COMPREHENSIVE      LIPID PANEL   2. Type 2 diabetes mellitus without complication, without long-term current use of insulin (HCC) E11.9 250.00 CBC WITH AUTOMATED DIFF      METABOLIC PANEL, COMPREHENSIVE      LIPID PANEL      HEMOGLOBIN A1C WITH EAG      HM DIABETES FOOT EXAM      SITagliptin (JANUVIA) 100 mg tablet      dapagliflozin (FARXIGA) 10 mg tab tablet   3. Elevated LDL cholesterol level L45.60 429.0 METABOLIC PANEL, COMPREHENSIVE      LIPID PANEL      CK       1. Continue current medications. 2.  Increase Januvia to 100mg daily. Increase Farxiga to 10mg daily. Menses currently--plan microalbumin screening at follow-up. 1-3:  Updated fasting labs today. Follow-up and Dispositions    · Return in about 3 months (around 4/7/2020) for Diabetes follow-up, non-fasting labs. lab results and schedule of future lab studies reviewed with patient  reviewed diet, exercise and weight control  reviewed medications and side effects in detail    For additional documentation of information and/or recommendations discussed this visit, please see notes in instructions. Plan and evaluation (above) reviewed with pt at visit  Patient voiced understanding of plan and provided with time to ask/review questions. After Visit Summary (AVS) provided to pt after visit with additional instructions as needed/reviewed. Future Appointments   Date Time Provider Maureen Broussard   4/21/2020  8:00 AM Linda Jefferson MD [facility]   --Updated future visits after patient check-out.

## 2020-01-08 LAB
ALBUMIN SERPL-MCNC: 4.5 G/DL (ref 3.5–5.5)
ALBUMIN/GLOB SERPL: 1.6 {RATIO} (ref 1.2–2.2)
ALP SERPL-CCNC: 62 IU/L (ref 39–117)
ALT SERPL-CCNC: 8 IU/L (ref 0–32)
AST SERPL-CCNC: 13 IU/L (ref 0–40)
BASOPHILS # BLD AUTO: 0.1 X10E3/UL (ref 0–0.2)
BASOPHILS NFR BLD AUTO: 1 %
BILIRUB SERPL-MCNC: 0.4 MG/DL (ref 0–1.2)
BUN SERPL-MCNC: 16 MG/DL (ref 6–24)
BUN/CREAT SERPL: 20 (ref 9–23)
CALCIUM SERPL-MCNC: 9.7 MG/DL (ref 8.7–10.2)
CHLORIDE SERPL-SCNC: 101 MMOL/L (ref 96–106)
CHOLEST SERPL-MCNC: 184 MG/DL (ref 100–199)
CK SERPL-CCNC: 44 U/L (ref 24–173)
CO2 SERPL-SCNC: 24 MMOL/L (ref 20–29)
CREAT SERPL-MCNC: 0.79 MG/DL (ref 0.57–1)
EOSINOPHIL # BLD AUTO: 0.2 X10E3/UL (ref 0–0.4)
EOSINOPHIL NFR BLD AUTO: 2 %
ERYTHROCYTE [DISTWIDTH] IN BLOOD BY AUTOMATED COUNT: 13.3 % (ref 11.7–15.4)
EST. AVERAGE GLUCOSE BLD GHB EST-MCNC: 252 MG/DL
GLOBULIN SER CALC-MCNC: 2.9 G/DL (ref 1.5–4.5)
GLUCOSE SERPL-MCNC: 214 MG/DL (ref 65–99)
HBA1C MFR BLD: 10.4 % (ref 4.8–5.6)
HCT VFR BLD AUTO: 44.5 % (ref 34–46.6)
HDLC SERPL-MCNC: 54 MG/DL
HGB BLD-MCNC: 14.8 G/DL (ref 11.1–15.9)
IMM GRANULOCYTES # BLD AUTO: 0 X10E3/UL (ref 0–0.1)
IMM GRANULOCYTES NFR BLD AUTO: 0 %
LDLC SERPL CALC-MCNC: 108 MG/DL (ref 0–99)
LYMPHOCYTES # BLD AUTO: 1.3 X10E3/UL (ref 0.7–3.1)
LYMPHOCYTES NFR BLD AUTO: 18 %
MCH RBC QN AUTO: 30.1 PG (ref 26.6–33)
MCHC RBC AUTO-ENTMCNC: 33.3 G/DL (ref 31.5–35.7)
MCV RBC AUTO: 90 FL (ref 79–97)
MONOCYTES # BLD AUTO: 0.6 X10E3/UL (ref 0.1–0.9)
MONOCYTES NFR BLD AUTO: 8 %
NEUTROPHILS # BLD AUTO: 5.3 X10E3/UL (ref 1.4–7)
NEUTROPHILS NFR BLD AUTO: 71 %
PLATELET # BLD AUTO: 258 X10E3/UL (ref 150–450)
POTASSIUM SERPL-SCNC: 4.6 MMOL/L (ref 3.5–5.2)
PROT SERPL-MCNC: 7.4 G/DL (ref 6–8.5)
RBC # BLD AUTO: 4.92 X10E6/UL (ref 3.77–5.28)
SODIUM SERPL-SCNC: 140 MMOL/L (ref 134–144)
TRIGL SERPL-MCNC: 111 MG/DL (ref 0–149)
VLDLC SERPL CALC-MCNC: 22 MG/DL (ref 5–40)
WBC # BLD AUTO: 7.5 X10E3/UL (ref 3.4–10.8)

## 2020-02-27 DIAGNOSIS — E11.9 TYPE 2 DIABETES MELLITUS WITHOUT COMPLICATION, WITHOUT LONG-TERM CURRENT USE OF INSULIN (HCC): ICD-10-CM

## 2020-03-03 RX ORDER — DAPAGLIFLOZIN 5 MG/1
TABLET, FILM COATED ORAL
Qty: 90 TAB | Refills: 0 | OUTPATIENT
Start: 2020-03-03

## 2020-03-03 NOTE — TELEPHONE ENCOUNTER
Kely Hallmark refilled as 10mg tab 1-7-20. Increased from 5mg tab then at last visit with me    Please clarify request for 5mg tab with pt/pharmacy.

## 2020-03-03 NOTE — TELEPHONE ENCOUNTER
Writer spoke to patient who states she did start and currently using Farxiga 10 mg dose.  Please send to pharmacy on file

## 2020-03-09 NOTE — TELEPHONE ENCOUNTER
Refill request(s) approved--Farxiga--current dose as clarified:    Requested Prescriptions     Signed Prescriptions Disp Refills    dapagliflozin (FARXIGA) 10 mg tab tablet 90 Tab 1     Sig: TAKE 1 TABLET BY MOUTH ONCE DAILY (TAKE INSTEAD OF JARDIANCE)     Authorizing Provider: Anshul Balbuena     Refused Prescriptions Disp Refills    FARXIGA 5 mg tab tablet [Pharmacy Med Name: Ina Camera 5 MG Oral Tablet] 90 Tab 0     Sig: TAKE 1 TABLET BY MOUTH ONCE DAILY (TAKE INSTEAD OF Valeta Rounds)     Refused By: Evonne Whitmore     Reason for Refusal: Medication Discontinued

## 2020-05-26 DIAGNOSIS — E11.9 TYPE 2 DIABETES MELLITUS WITHOUT COMPLICATION, WITHOUT LONG-TERM CURRENT USE OF INSULIN (HCC): ICD-10-CM

## 2020-05-26 DIAGNOSIS — I10 ESSENTIAL HYPERTENSION: ICD-10-CM

## 2020-05-30 RX ORDER — LISINOPRIL 10 MG/1
TABLET ORAL
Qty: 90 TAB | Refills: 1 | Status: SHIPPED | OUTPATIENT
Start: 2020-05-30 | End: 2020-11-20

## 2020-05-30 NOTE — TELEPHONE ENCOUNTER
Refill request(s) approved--lisinopril. Refill protocol details (computer-generated) reviewed, as available.

## 2020-06-30 ENCOUNTER — VIRTUAL VISIT (OUTPATIENT)
Dept: INTERNAL MEDICINE CLINIC | Age: 45
End: 2020-06-30

## 2020-06-30 DIAGNOSIS — K52.3 COLITIS, INDETERMINATE: ICD-10-CM

## 2020-06-30 DIAGNOSIS — E11.9 TYPE 2 DIABETES MELLITUS WITHOUT COMPLICATION, WITHOUT LONG-TERM CURRENT USE OF INSULIN (HCC): ICD-10-CM

## 2020-06-30 DIAGNOSIS — I10 ESSENTIAL HYPERTENSION: Primary | ICD-10-CM

## 2020-06-30 DIAGNOSIS — E78.00 ELEVATED LDL CHOLESTEROL LEVEL: ICD-10-CM

## 2020-06-30 NOTE — PROGRESS NOTES
Virtual Visit    Chief Complaint   Patient presents with    Diabetes     3 month follow up     Labs     nonfasting      1. Have you been to the ER, urgent care clinic since your last visit? Hospitalized since your last visit? No    2. Have you seen or consulted any other health care providers outside of the [address] [place] [address] since your last visit? Include any pap smears or colon screening. Yes Reason for visit: Colonoscopy done 3/12/20     Health Maintenance Due   Topic Date Due    Eye Exam Retinal or Dilated  10/20/1984    PAP AKA CERVICAL CYTOLOGY  10/20/1995    MICROALBUMIN Q1  09/21/2019    Colonoscopy  12/08/2019    A1C test (Diabetic or Prediabetic)  04/07/2020     Abuse Screening Questionnaire 6/30/2020   Do you ever feel afraid of your partner? N   Are you in a relationship with someone who physically or mentally threatens you? N   Is it safe for you to go home?  Y     3 most recent PHQ Screens 6/30/2020   Little interest or pleasure in doing things Not at all   Feeling down, depressed, irritable, or hopeless Not at all   Total Score PHQ 2 0

## 2020-06-30 NOTE — PROGRESS NOTES
Gloria Thomas is a 38 y.o. female who was seen by synchronous (real-time) audio-video technology on 6/30/2020. Consent: Gloria Thomas, who was seen by synchronous (real-time) audio-video technology, and/or her healthcare decision maker, is aware that this patient-initiated, Telehealth encounter on 6/30/2020 is a billable service, with coverage as determined by her insurance carrier. She is aware that she may receive a bill and has provided verbal consent to proceed: Yes. I was in the office while conducting this encounter. Subjective:   Gloria Thomas was seen for Diabetes (3 month follow up ) and Labs (nonfasting )      Notes:  She has not had interim problems since last visit. Had colonoscopy with GI March with only one polyp and good colitis control. Has not had interim labs. No probelms with adjustment in med doses for DM last visit. No HGM monitoring regularly to review at visit. Nursing screenings reviewed by provider at visit. No Known Allergies    Prior to Admission medications    Medication Sig Start Date End Date Taking? Authorizing Provider   lisinopriL (PRINIVIL, ZESTRIL) 10 mg tablet Take 1 tablet by mouth once daily 5/30/20  Yes Graham Leach MD   dapagliflozin (FARXIGA) 10 mg tab tablet TAKE 1 TABLET BY MOUTH ONCE DAILY (TAKE INSTEAD OF JARDIANCE) 3/9/20  Yes Graham Leach MD   SITagliptin (JANUVIA) 100 mg tablet TAKE 1 TABLET BY MOUTH ONCE DAILY. 1/7/20  Yes Graham Leach MD   mesalamine ER (APRISO) 0.375 gram 24 hour capsule Take 1.5 g by mouth daily. Yes Provider, Historical   famotidine (PEPCID) 20 mg tablet Take 1 Tab by mouth two (2) times a day. 4/15/17  Yes Graham Leach MD   cetirizine (ZYRTEC) 10 mg tablet Take  by mouth daily as needed. Yes Provider, Historical   multivitamins chew Take  by mouth daily. Yes Provider, Historical   azaTHIOprine (IMURAN) 50 mg tablet 50 mg daily.  7/15/16  Yes Provider, Historical         ROS    PHYSICAL EXAMINATION:    Vital Signs: There were no vitals taken for this visit. Constitutional: [x] Appears well-developed and well-nourished [x] No apparent distress      Mental status: [x] Alert and awake  [x] Oriented [x] Able to follow commands       Eyes:   EOM    [x]  Normal      Sclera  [x]  Normal              Discharge [x]  None visible       HENT: [x] Normocephalic, atraumatic    [x] Mouth/Throat: Mucous membranes are moist    External Ears [x] Normal      Neck: [x] No visualized mass     Pulmonary/Chest: [x] Respiratory effort normal   [x] No visualized signs of difficulty breathing or respiratory distress    Musculoskeletal:  [x] Normal range of motion of neck    Neurological:        [x] No Facial Asymmetry (Cranial nerve 7 motor function) (limited exam due to video visit)          [x] No gaze palsy     Skin:        [x] No significant exanthematous lesions or discoloration noted on facial skin             Psychiatric:       [x] Normal Affect       Other pertinent observable physical exam findings:  None. We discussed the expected course, resolution and complications of the diagnosis(es) in detail. Medication risks, benefits, costs, interactions, and alternatives were discussed as indicated. I advised her to contact the office if her condition worsens, changes or fails to improve as anticipated. She expressed understanding with the diagnosis(es) and plan. Apollo Zamudio is a 38 y.o. female who was evaluated by a video visit encounter for concerns as above. Patient identification was verified prior to start of the visit. A caregiver was present when appropriate. Due to this being a TeleHealth encounter (During VIIKO-80 public health emergency), evaluation of the following organ systems was limited: Vitals/Constitutional/EENT/Resp/CV/GI//MS/Neuro/Skin/Heme-Lymph-Imm.   Pursuant to the emergency declaration under the [facility] Act, 1135 waiver authority and the Coronavirus Preparedness and Response Supplemental Appropriations Act, this Virtual  Visit was conducted, with patient's (and/or legal guardian's) consent, to reduce the patient's risk of exposure to COVID-19 and provide necessary medical care. Services were provided through a video synchronous discussion virtually to substitute for in-person clinic visit. Assessment & Plan:   Diagnoses and all orders for this visit:      ICD-10-CM ICD-9-CM    1. Essential hypertension C65 150.1 METABOLIC PANEL, COMPREHENSIVE      LIPID PANEL   2. Type 2 diabetes mellitus without complication, without long-term current use of insulin ([facility]) D76.9 266.17 METABOLIC PANEL, COMPREHENSIVE      LIPID PANEL      MICROALBUMIN, UR, RAND W/ MICROALB/CREAT RATIO      HEMOGLOBIN A1C WITH EAG   3. Elevated LDL cholesterol level Y83.40 913.3 METABOLIC PANEL, COMPREHENSIVE      LIPID PANEL   4. Colitis, indeterminate K52.3 558.9        1,2,3:  Updated fasting labs at [facility] reviewed. Unable to do routine labs in clinic at this time. 4.  Following with GI. Requested colonoscopy report from pt in AVS to update records here. Follow-up and Dispositions    · Return in about 6 months (around 12/30/2020), or if symptoms worsen or fail to improve, for Diabetes follow-up, medication follow-up, fasting labs. lab results and schedule of future lab studies reviewed with patient  reviewed medications and side effects in detail    For additional documentation of information and/or recommendations discussed this visit, please see notes in instructions. Plan and evaluation (above) reviewed with pt at visit  Patient voiced understanding of plan and provided with time to ask/review questions. After Visit Summary (AVS) provided to pt after visit with additional instructions as needed/reviewed. AVS:  [x]  Sent to patient as Pinshape message after visit.   []  Mailed to patient after visit. []  Not sent to patient after visit. No future appointments.

## 2020-06-30 NOTE — PATIENT INSTRUCTIONS
Please collect fasting labs (and urine sample) at [facility] as reviewed. Please have your GI provider send us a copy of your last colonoscopy to update your records here. They can fax directly to [phone].

## 2020-07-02 DIAGNOSIS — E11.9 TYPE 2 DIABETES MELLITUS WITHOUT COMPLICATION, WITHOUT LONG-TERM CURRENT USE OF INSULIN (HCC): ICD-10-CM

## 2020-07-02 NOTE — TELEPHONE ENCOUNTER
Last visit 06/30/2020 Virtual visit MD Marilyn Neal   Next appointment 6 months (12/2020)   Previous refill encounter(s) 01/07/2020 Januvia #90 with 1 refill     Requested Prescriptions     Pending Prescriptions Disp Refills    SITagliptin (JANUVIA) 100 mg tablet 90 Tab 1     Sig: Take 1 Tab by mouth daily.

## 2020-07-25 LAB
ALBUMIN SERPL-MCNC: 4.6 G/DL (ref 3.8–4.8)
ALBUMIN/CREAT UR: 11 MG/G CREAT (ref 0–29)
ALBUMIN/GLOB SERPL: 1.4 {RATIO} (ref 1.2–2.2)
ALP SERPL-CCNC: 66 IU/L (ref 39–117)
ALT SERPL-CCNC: 14 IU/L (ref 0–32)
AST SERPL-CCNC: 14 IU/L (ref 0–40)
BILIRUB SERPL-MCNC: 0.4 MG/DL (ref 0–1.2)
BUN SERPL-MCNC: 20 MG/DL (ref 6–24)
BUN/CREAT SERPL: 28 (ref 9–23)
CALCIUM SERPL-MCNC: 9.8 MG/DL (ref 8.7–10.2)
CHLORIDE SERPL-SCNC: 93 MMOL/L (ref 96–106)
CHOLEST SERPL-MCNC: 214 MG/DL (ref 100–199)
CO2 SERPL-SCNC: 23 MMOL/L (ref 20–29)
CREAT SERPL-MCNC: 0.72 MG/DL (ref 0.57–1)
CREAT UR-MCNC: 36.6 MG/DL
EST. AVERAGE GLUCOSE BLD GHB EST-MCNC: 269 MG/DL
GLOBULIN SER CALC-MCNC: 3.2 G/DL (ref 1.5–4.5)
GLUCOSE SERPL-MCNC: 249 MG/DL (ref 65–99)
HBA1C MFR BLD: 11 % (ref 4.8–5.6)
HDLC SERPL-MCNC: 58 MG/DL
LDLC SERPL CALC-MCNC: 130 MG/DL (ref 0–99)
MICROALBUMIN UR-MCNC: 4.1 UG/ML
POTASSIUM SERPL-SCNC: 4.4 MMOL/L (ref 3.5–5.2)
PROT SERPL-MCNC: 7.8 G/DL (ref 6–8.5)
SODIUM SERPL-SCNC: 135 MMOL/L (ref 134–144)
TRIGL SERPL-MCNC: 128 MG/DL (ref 0–149)
VLDLC SERPL CALC-MCNC: 26 MG/DL (ref 5–40)

## 2020-11-16 DIAGNOSIS — I10 ESSENTIAL HYPERTENSION: ICD-10-CM

## 2020-11-16 DIAGNOSIS — E11.9 TYPE 2 DIABETES MELLITUS WITHOUT COMPLICATION, WITHOUT LONG-TERM CURRENT USE OF INSULIN (HCC): ICD-10-CM

## 2020-11-19 DIAGNOSIS — I10 ESSENTIAL HYPERTENSION: ICD-10-CM

## 2020-11-19 DIAGNOSIS — E11.9 TYPE 2 DIABETES MELLITUS WITHOUT COMPLICATION, WITHOUT LONG-TERM CURRENT USE OF INSULIN (HCC): ICD-10-CM

## 2020-11-19 RX ORDER — LISINOPRIL 10 MG/1
10 TABLET ORAL DAILY
Qty: 90 TAB | Refills: 0 | Status: CANCELLED | OUTPATIENT
Start: 2020-11-19

## 2020-11-19 NOTE — TELEPHONE ENCOUNTER
Last visit 06/30/2020 Virtual visit MD Maria Dolores Pitt   Next appointment 6 months (12/2020)   Previous refill encounter(s)   05/30/2020 Prinivil #90 with 1 refill. Requested Prescriptions     Pending Prescriptions Disp Refills    lisinopriL (PRINIVIL, ZESTRIL) 10 mg tablet 90 Tab 1     Sig: Take 1 Tab by mouth daily.

## 2020-11-20 RX ORDER — LISINOPRIL 10 MG/1
TABLET ORAL
Qty: 90 TAB | Refills: 0 | Status: SHIPPED | OUTPATIENT
Start: 2020-11-20 | End: 2021-01-21 | Stop reason: SDUPTHER

## 2020-12-20 DIAGNOSIS — E11.9 TYPE 2 DIABETES MELLITUS WITHOUT COMPLICATION, WITHOUT LONG-TERM CURRENT USE OF INSULIN (HCC): ICD-10-CM

## 2020-12-20 RX ORDER — SITAGLIPTIN 100 MG/1
TABLET, FILM COATED ORAL
Qty: 90 TAB | Refills: 1 | Status: SHIPPED | OUTPATIENT
Start: 2020-12-20 | End: 2021-07-06

## 2020-12-21 NOTE — TELEPHONE ENCOUNTER
Refill request(s) approved--Farxiga, Januvia. Refill protocol details (computer-generated) reviewed, as available. No future appointments. MyChart message to pt--to schedule follow-up appt.

## 2021-01-21 ENCOUNTER — VIRTUAL VISIT (OUTPATIENT)
Dept: INTERNAL MEDICINE CLINIC | Age: 46
End: 2021-01-21
Payer: COMMERCIAL

## 2021-01-21 DIAGNOSIS — I10 ESSENTIAL HYPERTENSION: ICD-10-CM

## 2021-01-21 DIAGNOSIS — E11.9 TYPE 2 DIABETES MELLITUS WITHOUT COMPLICATION, WITHOUT LONG-TERM CURRENT USE OF INSULIN (HCC): ICD-10-CM

## 2021-01-21 PROCEDURE — 99214 OFFICE O/P EST MOD 30 MIN: CPT | Performed by: INTERNAL MEDICINE

## 2021-01-21 RX ORDER — METFORMIN HYDROCHLORIDE 500 MG/1
500 TABLET, EXTENDED RELEASE ORAL
Qty: 30 TAB | Status: CANCELLED | OUTPATIENT
Start: 2021-01-21

## 2021-01-21 RX ORDER — LISINOPRIL 10 MG/1
TABLET ORAL
Qty: 90 TAB | Refills: 1 | Status: SHIPPED | OUTPATIENT
Start: 2021-01-21 | End: 2021-08-26

## 2021-01-21 NOTE — LETTER
1/21/2021 1:46 PM 
 
 
 
Ms. Kiran Cast [address] [facility] [address] [place] [phone] Please see enclosed lab orders--for fasting labs at [facility].

## 2021-01-21 NOTE — PROGRESS NOTES
Yuly Sampson (: 1975) is a 54 y.o. female, established patient, here for evaluation of the following chief complaint(s)--see below:    Yuly Sampson is a 54 y.o. female who was seen by synchronous (real-time) audio-video technology on 2021. Consent: Yuly Sampson, who was seen by synchronous (real-time) audio-video technology, and/or her healthcare decision maker, is aware that this patient-initiated, Telehealth encounter on 2021 is a billable service, with coverage as determined by her insurance carrier. She is aware that she may receive a bill and has provided verbal consent to proceed: Yes. I was in the office while conducting this encounter. Subjective:   Yuly Sampson was seen for:  Chief Complaint   Patient presents with    Follow-up     medication refill       Notes:  She feels \"good\"  Not doing HGM regularly. Has not had labs with GI. Reviewed lab monitoring and follow-up. She has had problems with metformin, but willing to re-try if needed based on repeat A1c. Nursing screenings reviewed by provider at visit. No Known Allergies    Prior to Admission medications    Medication Sig Start Date End Date Taking? Authorizing Provider   Januvia 100 mg tablet Take 1 tablet by mouth once daily 20  Yes Johnson Nick MD   dapagliflozin [place]) 10 mg tab tablet TAKE 1 TABLET BY MOUTH ONCE DAILY (TAKE  INSTEAD  OF  [place]land [place]) 20  Yes Johnson Nick MD   lisinopriL (PRINIVIL, ZESTRIL) 10 mg tablet Take 1 tablet by mouth once daily 20  Yes Johnson Nick MD   mesalamine ER (APRISO) 0.375 gram 24 hour capsule Take 1.5 g by mouth daily. Yes Provider, Historical   famotidine (PEPCID) 20 mg tablet Take 1 Tab by mouth two (2) times a day. 4/15/17  Yes Johnson Nick MD   cetirizine (ZYRTEC) 10 mg tablet Take  by mouth daily as needed. Yes Provider, Historical   multivitamins chew Take  by mouth daily. Yes Provider, Historical   azaTHIOprine (IMURAN) 50 mg tablet 50 mg daily. 7/15/16  Yes Provider, Historical         ROS    PHYSICAL EXAMINATION:    Vital Signs: There were no vitals taken for this visit. No flowsheet data found. Constitutional: [x] Appears well-developed and well-nourished [x] No apparent distress      Mental status: [x] Alert and awake  [x] Oriented [x] Able to follow commands       Eyes:   EOM    [x]  Normal      Sclera  [x]  Normal              Discharge [x]  None visible       HENT: [x] Normocephalic, atraumatic    [x] Mouth/Throat: Mucous membranes are moist    External Ears [x] Normal      Neck: [x] No visualized mass     Pulmonary/Chest: [x] Respiratory effort normal   [x] No visualized signs of difficulty breathing or respiratory distress    Musculoskeletal:  [x] Normal range of motion of neck    Neurological:        [x] No Facial Asymmetry (Cranial nerve 7 motor function) (limited exam due to video visit)          [x] No gaze palsy     Skin:        [x] No significant exanthematous lesions or discoloration noted on facial skin             Psychiatric:       [x] Normal Affect       Other pertinent observable physical exam findings:  None. We discussed the expected course, resolution and complications of the diagnosis(es) in detail. Medication risks, benefits, costs, interactions, and alternatives were discussed as indicated. I advised her to contact the office if her condition worsens, changes or fails to improve as anticipated. She expressed understanding with the diagnosis(es) and plan. Joseph Wood is a 54 y.o. female who was evaluated by a video visit encounter for concerns as     Assessment & Plan:   Diagnoses and all orders for this visit:      ICD-10-CM ICD-9-CM    1.  Type 2 diabetes mellitus without complication, without long-term current use of insulin ([facility])  E11.9 250.00 lisinopriL (PRINIVIL, ZESTRIL) 10 mg tablet      CBC WITH AUTOMATED DIFF      METABOLIC PANEL, COMPREHENSIVE      HEMOGLOBIN A1C WITH EAG      LIPID PANEL   2. Essential hypertension  I10 401.9 lisinopriL (PRINIVIL, ZESTRIL) 10 mg tablet      METABOLIC PANEL, COMPREHENSIVE      LIPID PANEL       1,2:  Refills and lab reviewed. She will update at [facility] as planned. Reviewed trial metformin XR if A1c elevated. Follow-up below reviewed. Follow-up and Dispositions    · Return in about 3 months (around 4/21/2021) for Diabetes follow-up, Blood Pressure follow-up, non-fasting labs. lab results and schedule of future lab studies reviewed with patient  reviewed medications and side effects in detail    Plan and evaluation (above) reviewed with pt at visit  Patient voiced understanding of plan and provided with time to ask/review questions. After Visit Summary (AVS) provided to pt after visit with additional instructions as needed/reviewed. AVS:  [x]  Available to patient in [facility] after visit signed. [x]  Lab orders mailed to patient after visit--as reviewed to do at [facility].  []  Not sent to patient after visit. No future appointments. Emre Alonso is being evaluated by a Virtual Visit (video visit) encounter to address concerns as mentioned above. A caregiver was present when appropriate. Due to this being a TeleHealth encounter (During XBUKI-95 public health emergency), evaluation of the following organ systems was limited: Vitals/Constitutional/EENT/Resp/CV/GI//MS/Neuro/Skin/Heme-Lymph-Imm. Pursuant to the emergency declaration under the [address] [place] [address] authority and the [employer] Act, this Virtual Visit was conducted with patient's (and/or legal guardian's) consent, to reduce the patient's risk of exposure to COVID-19 and provide necessary medical care.   The patient (and/or legal guardian) has also been advised to contact this office for worsening conditions or problems, and seek emergency medical treatment and/or call 911 if deemed necessary. Patient identification was verified at the start of the visit: YES. Services were provided through a video synchronous discussion virtually to substitute for in-person clinic visit. Patient was located at their individual home (or other location as per patient preference). Provider was located in medical office. An electronic signature was used to authenticate this note.   -- Geovanna Simmons MD

## 2021-01-21 NOTE — PROGRESS NOTES
Virtual Visit    Chief Complaint   Patient presents with    Follow-up     medication refill       1. Have you been to the ER, urgent care clinic since your last visit? Hospitalized since your last visit? No    2. Have you seen or consulted any other health care providers outside of the [address] [place] [address] since your last visit? Include any pap smears or colon screening. No    Health Maintenance Due   Topic Date Due    Eye Exam Retinal or Dilated  10/20/1984    COVID-19 Vaccine (1 of 2) 10/20/1990    PAP AKA CERVICAL CYTOLOGY  10/20/1995    Flu Vaccine (1) 09/01/2020    A1C test (Diabetic or Prediabetic)  10/24/2020    Foot Exam Q1  01/07/2021       Learning Assessment 1/7/2020   PRIMARY LEARNER Patient   HIGHEST LEVEL OF EDUCATION - PRIMARY LEARNER  -   BARRIERS PRIMARY LEARNER NONE   CO-LEARNER CAREGIVER -   PRIMARY LANGUAGE ENGLISH   LEARNER PREFERENCE PRIMARY READING   ANSWERED BY patient   RELATIONSHIP SELF         AVS, education, follow up and recommendations provided and addressed with patient.   services used to advise patient no

## 2021-01-25 NOTE — TELEPHONE ENCOUNTER
Lab orders mailed to pt after VV on 1-21-21. She will update labs at [facility].    Reviewed starting metformin if A1c elevated/not at goal.    She had problems in past, but willing to re-try.

## 2021-03-12 LAB
ALBUMIN SERPL-MCNC: 4.4 G/DL (ref 3.8–4.8)
ALBUMIN/GLOB SERPL: 1.6 {RATIO} (ref 1.2–2.2)
ALP SERPL-CCNC: 71 IU/L (ref 39–117)
ALT SERPL-CCNC: 9 IU/L (ref 0–32)
AST SERPL-CCNC: 11 IU/L (ref 0–40)
BASOPHILS # BLD AUTO: 0.1 X10E3/UL (ref 0–0.2)
BASOPHILS NFR BLD AUTO: 1 %
BILIRUB SERPL-MCNC: 0.4 MG/DL (ref 0–1.2)
BUN SERPL-MCNC: 21 MG/DL (ref 6–24)
BUN/CREAT SERPL: 27 (ref 9–23)
CALCIUM SERPL-MCNC: 9.7 MG/DL (ref 8.7–10.2)
CHLORIDE SERPL-SCNC: 100 MMOL/L (ref 96–106)
CHOLEST SERPL-MCNC: 191 MG/DL (ref 100–199)
CO2 SERPL-SCNC: 24 MMOL/L (ref 20–29)
CREAT SERPL-MCNC: 0.77 MG/DL (ref 0.57–1)
EOSINOPHIL # BLD AUTO: 0.3 X10E3/UL (ref 0–0.4)
EOSINOPHIL NFR BLD AUTO: 4 %
ERYTHROCYTE [DISTWIDTH] IN BLOOD BY AUTOMATED COUNT: 13.9 % (ref 11.7–15.4)
EST. AVERAGE GLUCOSE BLD GHB EST-MCNC: 266 MG/DL
GLOBULIN SER CALC-MCNC: 2.8 G/DL (ref 1.5–4.5)
GLUCOSE SERPL-MCNC: 279 MG/DL (ref 65–99)
HBA1C MFR BLD: 10.9 % (ref 4.8–5.6)
HCT VFR BLD AUTO: 44.8 % (ref 34–46.6)
HDLC SERPL-MCNC: 56 MG/DL
HGB BLD-MCNC: 14.8 G/DL (ref 11.1–15.9)
IMM GRANULOCYTES # BLD AUTO: 0.1 X10E3/UL (ref 0–0.1)
IMM GRANULOCYTES NFR BLD AUTO: 1 %
LDLC SERPL CALC-MCNC: 115 MG/DL (ref 0–99)
LYMPHOCYTES # BLD AUTO: 1.4 X10E3/UL (ref 0.7–3.1)
LYMPHOCYTES NFR BLD AUTO: 20 %
MCH RBC QN AUTO: 31 PG (ref 26.6–33)
MCHC RBC AUTO-ENTMCNC: 33 G/DL (ref 31.5–35.7)
MCV RBC AUTO: 94 FL (ref 79–97)
MONOCYTES # BLD AUTO: 0.7 X10E3/UL (ref 0.1–0.9)
MONOCYTES NFR BLD AUTO: 10 %
NEUTROPHILS # BLD AUTO: 4.6 X10E3/UL (ref 1.4–7)
NEUTROPHILS NFR BLD AUTO: 64 %
PLATELET # BLD AUTO: 257 X10E3/UL (ref 150–450)
POTASSIUM SERPL-SCNC: 4.8 MMOL/L (ref 3.5–5.2)
PROT SERPL-MCNC: 7.2 G/DL (ref 6–8.5)
RBC # BLD AUTO: 4.78 X10E6/UL (ref 3.77–5.28)
SODIUM SERPL-SCNC: 140 MMOL/L (ref 134–144)
TRIGL SERPL-MCNC: 110 MG/DL (ref 0–149)
VLDLC SERPL CALC-MCNC: 20 MG/DL (ref 5–40)
WBC # BLD AUTO: 7.2 X10E3/UL (ref 3.4–10.8)

## 2021-06-06 RX ORDER — METFORMIN HYDROCHLORIDE 500 MG/1
500 TABLET, EXTENDED RELEASE ORAL
Qty: 30 TABLET | Refills: 2 | Status: SHIPPED | OUTPATIENT
Start: 2021-06-06 | End: 2021-08-26

## 2021-06-07 NOTE — TELEPHONE ENCOUNTER
Labs from Jan done March 2021. Needle message to pt--with lab results. Requested Prescriptions     Signed Prescriptions Disp Refills    metFORMIN ER (GLUCOPHAGE XR) 500 mg tablet 30 Tablet 2     Sig: Take 1 Tablet by mouth daily (with dinner).      Authorizing Provider: Denia Robledo

## 2021-06-29 DIAGNOSIS — E11.9 TYPE 2 DIABETES MELLITUS WITHOUT COMPLICATION, WITHOUT LONG-TERM CURRENT USE OF INSULIN (HCC): ICD-10-CM

## 2021-07-02 ENCOUNTER — TRANSCRIBE ORDER (OUTPATIENT)
Dept: SCHEDULING | Age: 46
End: 2021-07-02

## 2021-07-05 ENCOUNTER — TRANSCRIBE ORDER (OUTPATIENT)
Dept: SCHEDULING | Age: 46
End: 2021-07-05

## 2021-07-06 ENCOUNTER — TRANSCRIBE ORDER (OUTPATIENT)
Dept: SCHEDULING | Age: 46
End: 2021-07-06

## 2021-07-06 DIAGNOSIS — K50.911: Primary | ICD-10-CM

## 2021-07-06 RX ORDER — SITAGLIPTIN 100 MG/1
TABLET, FILM COATED ORAL
Qty: 90 TABLET | Refills: 1 | Status: SHIPPED | OUTPATIENT
Start: 2021-07-06 | End: 2021-12-12

## 2021-07-30 ENCOUNTER — HOSPITAL ENCOUNTER (OUTPATIENT)
Dept: MAMMOGRAPHY | Age: 46
Discharge: HOME | End: 2021-07-30
Attending: INTERNAL MEDICINE
Payer: COMMERCIAL

## 2021-07-30 DIAGNOSIS — K50.911: ICD-10-CM

## 2021-07-30 PROCEDURE — 77080 DXA BONE DENSITY AXIAL: CPT

## 2021-08-26 DIAGNOSIS — I10 ESSENTIAL HYPERTENSION: ICD-10-CM

## 2021-08-26 DIAGNOSIS — E11.9 TYPE 2 DIABETES MELLITUS WITHOUT COMPLICATION, WITHOUT LONG-TERM CURRENT USE OF INSULIN (HCC): ICD-10-CM

## 2021-08-26 RX ORDER — METFORMIN HYDROCHLORIDE 500 MG/1
TABLET, EXTENDED RELEASE ORAL
Qty: 90 TABLET | Refills: 1 | Status: SHIPPED | OUTPATIENT
Start: 2021-08-26 | End: 2021-12-08 | Stop reason: SDUPTHER

## 2021-08-26 RX ORDER — LISINOPRIL 10 MG/1
TABLET ORAL
Qty: 90 TABLET | Refills: 1 | Status: SHIPPED | OUTPATIENT
Start: 2021-08-26 | End: 2022-02-06

## 2021-08-26 NOTE — TELEPHONE ENCOUNTER
Refill request(s) approved--metformin, lisinopril. Refill protocol details (computer-generated) reviewed, as available.

## 2021-09-21 ENCOUNTER — OFFICE VISIT (OUTPATIENT)
Dept: INTERNAL MEDICINE CLINIC | Age: 46
End: 2021-09-21
Payer: COMMERCIAL

## 2021-09-21 VITALS
TEMPERATURE: 98.6 F | SYSTOLIC BLOOD PRESSURE: 139 MMHG | HEART RATE: 79 BPM | DIASTOLIC BLOOD PRESSURE: 85 MMHG | OXYGEN SATURATION: 96 %

## 2021-09-21 DIAGNOSIS — E78.00 ELEVATED LDL CHOLESTEROL LEVEL: ICD-10-CM

## 2021-09-21 DIAGNOSIS — E11.65 TYPE 2 DIABETES MELLITUS WITH HYPERGLYCEMIA, WITHOUT LONG-TERM CURRENT USE OF INSULIN (HCC): Primary | ICD-10-CM

## 2021-09-21 DIAGNOSIS — I10 ESSENTIAL HYPERTENSION: ICD-10-CM

## 2021-09-21 PROCEDURE — 99214 OFFICE O/P EST MOD 30 MIN: CPT | Performed by: INTERNAL MEDICINE

## 2021-09-21 NOTE — PROGRESS NOTES
Bishop Crawford (: 1975) is a 54 y.o. female, established patient, here for evaluation of the following chief complaint(s):  Chief Complaint   Patient presents with    Medication Evaluation       Assessment and Plan:       ICD-10-CM ICD-9-CM    1. Type 2 diabetes mellitus with hyperglycemia, without long-term current use of insulin (HCC)  E11.65 250.00 CBC WITH AUTOMATED DIFF     379.65 METABOLIC PANEL, COMPREHENSIVE      HEMOGLOBIN A1C WITH EAG      HM DIABETES FOOT EXAM      HEMOGLOBIN A1C WITH EAG      METABOLIC PANEL, COMPREHENSIVE      CBC WITH AUTOMATED DIFF      CANCELED: MICROALBUMIN, UR, RAND W/ MICROALB/CREAT RATIO      CANCELED: MICROALBUMIN, UR, RAND W/ MICROALB/CREAT RATIO   2. Essential hypertension  Y53 909.5 METABOLIC PANEL, COMPREHENSIVE      METABOLIC PANEL, COMPREHENSIVE   3. Elevated LDL cholesterol level  B01.75 005.3 METABOLIC PANEL, COMPREHENSIVE      METABOLIC PANEL, COMPREHENSIVE       1-3:  Lab monitoring reviewed. Continue current medications pending lab results/review. 1.  Plan increase metformin if A1c not at goal.      Follow-up and Dispositions    · Return in about 6 months (around 3/21/2022) for Blood Pressure follow-up, Diabetes follow-up, medication follow-up.       lab results and schedule of future lab studies reviewed with patient  reviewed medications and side effects in detail    For additional documentation of information and/or recommendations discussed this visit, please see notes in instructions. Plan and evaluation (above) reviewed with pt at visit  Patient voiced understanding of plan and provided with time to ask/review questions. After Visit Summary (AVS) provided to pt after visit with additional instructions as needed/reviewed. No future appointments. --Updated future visits after patient check-out.       History of Present Illness:     Notes (nursing/rooming note copied below in italics):  Pt is not fasting today     Labs updated prior to provider visit to be able to do in lab at visit today. She notes no interim problems. T[facility] Hernandez did colonoscopy and did bone density also. July 2021 bone density done there and normal.    Just screening due to aziothioprine therapy. No problems there with medicaiton mgt. Just sometimes worse with certain foods. Had seen with labs with GI prior to bone density in July. Seeing yearly in June-July going forward. Colonoscopy March 2020. Health Maintenance Due   Topic Date Due    Hepatitis C Screening  Never done    Eye Exam Retinal or Dilated  Never done    COVID-19 Vaccine (1) Never done    Cervical cancer screen  Never done    Pneumococcal 0-64 years (2 of 4 - PCV13) 08/02/2017    Colorectal Cancer Screening Combo  12/08/2019    Foot Exam Q1  01/07/2021    A1C test (Diabetic or Prediabetic)  06/11/2021    MICROALBUMIN Q1  07/24/2021    Flu Vaccine (1) 09/01/2021     Not interested in gyn screenings. Planning to do eye exam.  Has plans to do. She was working virtually, but in person now. 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 Prior to Admission medications    Medication Sig Start Date End Date Taking? Authorizing Provider   lisinopriL (PRINIVIL, ZESTRIL) 10 mg tablet Take 1 tablet by mouth once daily 8/26/21  Yes Adan Weaver MD   metFORMIN ER (GLUCOPHAGE XR) 500 mg tablet TAKE 1 TABLET BY MOUTH ONCE DAILY WITH  DINNER 8/26/21  Yes Adan Weaver MD   dapagliflozin Sania William) 10 mg tab tablet TAKE 1 TABLET BY MOUTH ONCE DAILY (TAKE INSTEAD OF JARDIANCE) 7/6/21  Yes Adan Weaver MD   Januvia 100 mg tablet Take 1 tablet by mouth once daily 7/6/21  Yes Adan Weaver MD   mesalamine ER (APRISO) 0.375 gram 24 hour capsule Take 1.5 g by mouth daily.    Yes Provider, Historical   famotidine (PEPCID) 20 mg tablet Take 1 Tab by mouth two (2) times a day. 4/15/17  Yes Kelsi Higginbotham MD   cetirizine (ZYRTEC) 10 mg tablet Take  by mouth daily as needed. Yes Provider, Historical   multivitamins chew Take  by mouth daily. Yes Provider, Historical   azaTHIOprine (IMURAN) 50 mg tablet 50 mg daily. 7/15/16  Yes Provider, Historical        ROS    Vitals:    09/21/21 1553   BP: 139/85   Pulse: 79   Temp: 98.6 °F (37 °C)   SpO2: 96%   PainSc:   0 - No pain      There is no height or weight on file to calculate BMI. Physical Exam:     Physical Exam  Vitals and nursing note reviewed. Constitutional:       General: She is not in acute distress. Appearance: Normal appearance. She is well-developed. She is not diaphoretic. HENT:      Head: Normocephalic and atraumatic. Eyes:      General: No scleral icterus. Right eye: No discharge. Left eye: No discharge. Conjunctiva/sclera: Conjunctivae normal.   Cardiovascular:      Rate and Rhythm: Normal rate and regular rhythm. Pulses: Normal pulses. Heart sounds: Normal heart sounds. No murmur heard. No friction rub. No gallop. Pulmonary:      Effort: Pulmonary effort is normal. No respiratory distress. Breath sounds: Normal breath sounds. No stridor. No wheezing, rhonchi or rales. Abdominal:      General: Bowel sounds are normal. There is no distension. Palpations: Abdomen is soft. Tenderness: There is no abdominal tenderness. There is no guarding. Musculoskeletal:         General: No swelling, tenderness, deformity or signs of injury. Right lower leg: No edema. Left lower leg: No edema. Skin:     General: Skin is warm. Coloration: Skin is not jaundiced or pale. Findings: No bruising, erythema or rash. Neurological:      General: No focal deficit present. Mental Status: She is alert. Motor: No abnormal muscle tone.       Coordination: Coordination normal.      Gait: Gait normal.   Psychiatric: Mood and Affect: Mood normal.         Behavior: Behavior normal.         Thought Content: Thought content normal.         Judgment: Judgment normal.           Diabetic foot exam:   Left Foot:   Visual Exam: normal    Pulse DP: 2+ (normal)   Filament test: normal sensation   Right Foot:   Visual Exam: normal    Pulse DP: 2+ (normal)   Filament test: normal sensation       An electronic signature was used to authenticate this note.   -- Laron Ball MD

## 2021-09-21 NOTE — PROGRESS NOTES
RM 17  Pt is not fasting today     Chief Complaint   Patient presents with    Medication Evaluation       Visit Vitals  /85   Pulse 79   Temp 98.6 °F (37 °C)   SpO2 96%       3 most recent PHQ Screens 9/21/2021   Little interest or pleasure in doing things Not at all   Feeling down, depressed, irritable, or hopeless Not at all   Total Score PHQ 2 0         1. Have you been to the ER, urgent care clinic since your last visit? Hospitalized since your last visit? No    2. Have you seen or consulted any other health care providers outside of the [address] [place] [address] since your last visit? Include any pap smears or colon screening. No    Health Maintenance Due   Topic Date Due    Hepatitis C Screening  Never done    Eye Exam Retinal or Dilated  Never done    COVID-19 Vaccine (1) Never done    Cervical cancer screen  Never done    Pneumococcal 0-64 years (2 of 4 - PCV13) 08/02/2017    Colorectal Cancer Screening Combo  12/08/2019    Foot Exam Q1  01/07/2021    A1C test (Diabetic or Prediabetic)  06/11/2021    MICROALBUMIN Q1  07/24/2021    Flu Vaccine (1) 09/01/2021       Learning Assessment 1/7/2020   PRIMARY LEARNER Patient   HIGHEST LEVEL OF EDUCATION - PRIMARY LEARNER  -   BARRIERS PRIMARY LEARNER NONE   CO-LEARNER CAREGIVER -   PRIMARY LANGUAGE ENGLISH   LEARNER PREFERENCE PRIMARY READING   ANSWERED BY patient   RELATIONSHIP SELF       AVS  education, follow up, and recommendations provided and addressed with patient.

## 2021-09-22 LAB
ALBUMIN SERPL-MCNC: 4.5 G/DL (ref 3.8–4.8)
ALBUMIN/GLOB SERPL: 1.7 {RATIO} (ref 1.2–2.2)
ALP SERPL-CCNC: 65 IU/L (ref 44–121)
ALT SERPL-CCNC: 8 IU/L (ref 0–32)
AST SERPL-CCNC: 11 IU/L (ref 0–40)
BASOPHILS # BLD AUTO: 0.1 X10E3/UL (ref 0–0.2)
BASOPHILS NFR BLD AUTO: 1 %
BILIRUB SERPL-MCNC: 0.2 MG/DL (ref 0–1.2)
BUN SERPL-MCNC: 20 MG/DL (ref 6–24)
BUN/CREAT SERPL: 22 (ref 9–23)
CALCIUM SERPL-MCNC: 9.6 MG/DL (ref 8.7–10.2)
CHLORIDE SERPL-SCNC: 99 MMOL/L (ref 96–106)
CO2 SERPL-SCNC: 24 MMOL/L (ref 20–29)
CREAT SERPL-MCNC: 0.93 MG/DL (ref 0.57–1)
EOSINOPHIL # BLD AUTO: 0.2 X10E3/UL (ref 0–0.4)
EOSINOPHIL NFR BLD AUTO: 2 %
ERYTHROCYTE [DISTWIDTH] IN BLOOD BY AUTOMATED COUNT: 13.5 % (ref 11.7–15.4)
EST. AVERAGE GLUCOSE BLD GHB EST-MCNC: 255 MG/DL
GLOBULIN SER CALC-MCNC: 2.7 G/DL (ref 1.5–4.5)
GLUCOSE SERPL-MCNC: 250 MG/DL (ref 65–99)
HBA1C MFR BLD: 10.5 % (ref 4.8–5.6)
HCT VFR BLD AUTO: 44.3 % (ref 34–46.6)
HGB BLD-MCNC: 14.7 G/DL (ref 11.1–15.9)
IMM GRANULOCYTES # BLD AUTO: 0 X10E3/UL (ref 0–0.1)
IMM GRANULOCYTES NFR BLD AUTO: 1 %
LYMPHOCYTES # BLD AUTO: 1.9 X10E3/UL (ref 0.7–3.1)
LYMPHOCYTES NFR BLD AUTO: 22 %
MCH RBC QN AUTO: 30.8 PG (ref 26.6–33)
MCHC RBC AUTO-ENTMCNC: 33.2 G/DL (ref 31.5–35.7)
MCV RBC AUTO: 93 FL (ref 79–97)
MONOCYTES # BLD AUTO: 0.6 X10E3/UL (ref 0.1–0.9)
MONOCYTES NFR BLD AUTO: 7 %
NEUTROPHILS # BLD AUTO: 5.7 X10E3/UL (ref 1.4–7)
NEUTROPHILS NFR BLD AUTO: 67 %
PLATELET # BLD AUTO: 233 X10E3/UL (ref 150–450)
POTASSIUM SERPL-SCNC: 4.3 MMOL/L (ref 3.5–5.2)
PROT SERPL-MCNC: 7.2 G/DL (ref 6–8.5)
RBC # BLD AUTO: 4.78 X10E6/UL (ref 3.77–5.28)
SODIUM SERPL-SCNC: 136 MMOL/L (ref 134–144)
SPECIMEN STATUS REPORT, ROLRST: NORMAL
WBC # BLD AUTO: 8.5 X10E3/UL (ref 3.4–10.8)

## 2021-12-08 NOTE — TELEPHONE ENCOUNTER
MD Piyush Forman is stating the metformin rx sent 8/26/21 does not have a refill available. Please send a new rx. Patient is out.    Thanks, Dimple Ely    (contacted [facility] to verify)    Last Visit: 9/21/21 MD Huston Saint  Next Appointment: Not scheduled  Previous Refill Encounter(s): 8/26/21 90 + 1 (Per [facility], did not have refill)    Requested Prescriptions     Pending Prescriptions Disp Refills    metFORMIN ER (GLUCOPHAGE XR) 500 mg tablet 90 Tablet 1     Sig: TAKE 1 TABLET BY MOUTH ONCE DAILY WITH  DINNER

## 2021-12-11 RX ORDER — METFORMIN HYDROCHLORIDE 500 MG/1
TABLET, EXTENDED RELEASE ORAL
Qty: 90 TABLET | Refills: 1 | Status: SHIPPED | OUTPATIENT
Start: 2021-12-11 | End: 2022-02-06

## 2021-12-11 RX ORDER — METFORMIN HYDROCHLORIDE 500 MG/1
TABLET, EXTENDED RELEASE ORAL
Qty: 90 TABLET | Refills: 0 | OUTPATIENT
Start: 2021-12-11

## 2021-12-12 DIAGNOSIS — E11.9 TYPE 2 DIABETES MELLITUS WITHOUT COMPLICATION, WITHOUT LONG-TERM CURRENT USE OF INSULIN (HCC): ICD-10-CM

## 2021-12-12 RX ORDER — SITAGLIPTIN 100 MG/1
TABLET, FILM COATED ORAL
Qty: 90 TABLET | Refills: 1 | Status: SHIPPED | OUTPATIENT
Start: 2021-12-12 | End: 2022-06-25

## 2021-12-12 NOTE — TELEPHONE ENCOUNTER
Refill request(s) approved--metformin. Refill protocol details (computer-generated) reviewed, as available.

## 2022-02-06 DIAGNOSIS — E11.9 TYPE 2 DIABETES MELLITUS WITHOUT COMPLICATION, WITHOUT LONG-TERM CURRENT USE OF INSULIN (HCC): ICD-10-CM

## 2022-02-06 DIAGNOSIS — I10 ESSENTIAL HYPERTENSION: ICD-10-CM

## 2022-02-06 RX ORDER — METFORMIN HYDROCHLORIDE 500 MG/1
TABLET, EXTENDED RELEASE ORAL
Qty: 90 TABLET | Refills: 1 | Status: SHIPPED | OUTPATIENT
Start: 2022-02-06 | End: 2022-04-01 | Stop reason: SDUPTHER

## 2022-02-06 RX ORDER — LISINOPRIL 10 MG/1
TABLET ORAL
Qty: 90 TABLET | Refills: 1 | Status: SHIPPED | OUTPATIENT
Start: 2022-02-06 | End: 2022-08-31

## 2022-03-19 PROBLEM — K52.3 COLITIS, INDETERMINATE: Status: ACTIVE | Noted: 2017-07-11

## 2022-03-28 ENCOUNTER — OFFICE VISIT (OUTPATIENT)
Dept: INTERNAL MEDICINE CLINIC | Age: 47
End: 2022-03-28
Payer: COMMERCIAL

## 2022-03-28 VITALS
DIASTOLIC BLOOD PRESSURE: 80 MMHG | HEIGHT: 69 IN | BODY MASS INDEX: 44.15 KG/M2 | SYSTOLIC BLOOD PRESSURE: 138 MMHG | HEART RATE: 80 BPM | TEMPERATURE: 97.9 F | RESPIRATION RATE: 16 BRPM

## 2022-03-28 DIAGNOSIS — E11.65 TYPE 2 DIABETES MELLITUS WITH HYPERGLYCEMIA, WITHOUT LONG-TERM CURRENT USE OF INSULIN (HCC): Primary | ICD-10-CM

## 2022-03-28 DIAGNOSIS — I10 ESSENTIAL HYPERTENSION: ICD-10-CM

## 2022-03-28 DIAGNOSIS — E78.00 ELEVATED LDL CHOLESTEROL LEVEL: ICD-10-CM

## 2022-03-28 PROCEDURE — 99214 OFFICE O/P EST MOD 30 MIN: CPT | Performed by: INTERNAL MEDICINE

## 2022-03-28 NOTE — PROGRESS NOTES
rm 25    Chief Complaint   Patient presents with    Diabetes     f/u    Hypertension     f/u     1. Have you been to the ER, urgent care clinic since your last visit? Hospitalized since your last visit? No    2. Have you seen or consulted any other health care providers outside of the [address] [place] [address] since your last visit? Include any pap smears or colon screening.  No     Visit Vitals  /80 (BP 1 Location: Left arm, BP Patient Position: Sitting, BP Cuff Size: Adult)   Pulse 80   Temp 97.9 °F (36.6 °C) (Oral)   Resp 16   Ht 5' 9\" (1.753 m)   BMI 44.15 kg/m²

## 2022-03-28 NOTE — PROGRESS NOTES
Zia Ferguson (: 1975) is a 51 y.o. female, established patient, here for evaluation of the following chief complaint(s):  Chief Complaint   Patient presents with    Diabetes     f/u    Hypertension     f/u       Assessment and Plan:       ICD-10-CM ICD-9-CM    1. Type 2 diabetes mellitus with hyperglycemia, without long-term current use of insulin (HCC)  E11.65 250.00 CBC WITH AUTOMATED DIFF     243.71 METABOLIC PANEL, COMPREHENSIVE      LIPID PANEL      HEMOGLOBIN A1C WITH EAG      MICROALBUMIN, UR, RAND W/ MICROALB/CREAT RATIO   2. Essential hypertension  I10 401.9    3. Elevated LDL cholesterol level  X57.61 899.4 METABOLIC PANEL, COMPREHENSIVE      LIPID PANEL      CK       1-3:  Lab monitoring reviewed. Continue current medications pending lab results/review. Follow-up and Dispositions    · Return in about 6 months (around 2022), or if symptoms worsen or fail to improve, for Diabetes follow-up, fasting labs. lab results and schedule of future lab studies reviewed with patient  reviewed medications and side effects in detail    For additional documentation of information and/or recommendations discussed this visit, please see notes in instructions. Plan and evaluation (above) reviewed with pt at visit  Patient voiced understanding of plan and provided with time to ask/review questions. After Visit Summary (AVS) provided to pt after visit with additional instructions as needed/reviewed. No future appointments. --Updated future visits after patient check-out. History of Present Illness:     Notes (nursing/rooming note copied below in italics):  As above    Last labs 2021. With last labs, planned to increase metformin to 1,000mg BID. She notes has increased to 3 metformin daily. She takes 1 in AM and 2 in evening. She had not tried to go to 4 tabs daily, as had \"too much going on\".   She has new glucometer and strips/lancets, but not checking HGM.      She has no specific symptoms of hyper/hypo-glycemia. She drinks a lot of water, so no changes in UOP. 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 Thinks may improve somewhat over time. Nursing screenings reviewed by provider at visit. Prior to Admission medications    Medication Sig Start Date End Date Taking? Authorizing Provider   lisinopriL (PRINIVIL, ZESTRIL) 10 mg tablet Take 1 tablet by mouth once daily 2/6/22  Yes Yobany Barrera MD   metFORMIN ER (GLUCOPHAGE XR) 500 mg tablet TAKE 1 TABLET BY MOUTH ONCE DAILY WITH DINNER 2/6/22  Yes Yobany Barrera MD   dapagliflozin [place]) 10 mg tab tablet TAKE 1 TABLET BY MOUTH ONCE DAILY  (TAKE INSTEAD OF JARDIANCE) 12/12/21  Yes Yobany Barrera MD   Januvia 100 mg tablet Take 1 tablet by mouth once daily 12/12/21  Yes Yobany Barrera MD   mesalamine ER (APRISO) 0.375 gram 24 hour capsule Take 1.5 g by mouth daily. Yes Provider, Historical   famotidine (PEPCID) 20 mg tablet Take 1 Tab by mouth two (2) times a day. 4/15/17  Yes Yobany Barrera MD   cetirizine (ZYRTEC) 10 mg tablet Take  by mouth daily as needed. Yes Provider, Historical   multivitamins chew Take  by mouth daily. Yes Provider, Historical   azaTHIOprine (IMURAN) 50 mg tablet 50 mg daily. 7/15/16  Yes Provider, Historical        ROS    Vitals:    03/28/22 1543   BP: 138/80   Pulse: 80   Resp: 16   Temp: 97.9 °F (36.6 °C)   TempSrc: Oral   Height: 5' 9\" (1.753 m)      Body mass index is 44.15 kg/m². Physical Exam:     Physical Exam  Vitals and nursing note reviewed. Constitutional:       General: She is not in acute distress. Appearance: Normal appearance. She is well-developed. She is not diaphoretic. HENT:      Head: Normocephalic and atraumatic. Eyes:      General: No scleral icterus. Right eye: No discharge. Left eye: No discharge.       Conjunctiva/sclera: Conjunctivae normal.   Cardiovascular:      Rate and Rhythm: Normal rate and regular rhythm. Pulses: Normal pulses. Heart sounds: Normal heart sounds. No murmur heard. No friction rub. No gallop. Pulmonary:      Effort: Pulmonary effort is normal. No respiratory distress. Breath sounds: Normal breath sounds. No stridor. No wheezing, rhonchi or rales. Abdominal:      General: Bowel sounds are normal. There is no distension. Palpations: Abdomen is soft. Tenderness: There is no abdominal tenderness. There is no guarding. Musculoskeletal:         General: No swelling, tenderness, deformity or signs of injury. Right lower leg: No edema. Left lower leg: No edema. Skin:     General: Skin is warm. Coloration: Skin is not jaundiced or pale. Findings: No bruising, erythema or rash. Neurological:      General: No focal deficit present. Mental Status: She is alert. Motor: No abnormal muscle tone. Coordination: Coordination normal.      Gait: Gait normal.   Psychiatric:         Mood and Affect: Mood normal.         Behavior: Behavior normal.         Thought Content: Thought content normal.         Judgment: Judgment normal.         An electronic signature was used to authenticate this note.   -- Aron Dempsey MD

## 2022-03-30 ENCOUNTER — PATIENT MESSAGE (OUTPATIENT)
Dept: INTERNAL MEDICINE CLINIC | Age: 47
End: 2022-03-30

## 2022-04-01 NOTE — TELEPHONE ENCOUNTER
Dr. Lizzy Renteria,          [address] left a voice message on behalf of the pt requesting an updated prescription for the Glucophage-XR on behalf of the pt. Pharmacy stated that pt said the directions have increased to pt talking a total of 3 tabs per day. Please review and prescribe pending prescription if appropriate. BCM: [id number]    In LOV on 03/28/2022 an increase was noted however a new prescription was not prescribed. Last visit 03/28/2022 MD Lizzy Renteria   Next appointment 6 months (09/2022)   Previous refill encounter(s)   02/06/2022 Glucophage-XR #90 with 1 refill.      Requested Prescriptions     Pending Prescriptions Disp Refills    metFORMIN ER (GLUCOPHAGE XR) 500 mg tablet 90 Tablet 0     Sig: Take 1 tab by mouth in the morning and 2 tabs in the evening

## 2022-04-02 RX ORDER — METFORMIN HYDROCHLORIDE 500 MG/1
TABLET, EXTENDED RELEASE ORAL
Qty: 270 TABLET | Refills: 1 | Status: SHIPPED | OUTPATIENT
Start: 2022-04-02 | End: 2022-07-14 | Stop reason: SDUPTHER

## 2022-04-02 NOTE — TELEPHONE ENCOUNTER
Refill request(s) approved--metformin--at current dose 3 tabs daily. Refill protocol details (computer-generated) reviewed, as available.     Requested Prescriptions     Signed Prescriptions Disp Refills    metFORMIN ER (GLUCOPHAGE XR) 500 mg tablet 270 Tablet 1     Sig: Take 1 tab by mouth in the morning and 2 tabs in the evening     Authorizing Provider: Hilda Olguin

## 2022-04-09 LAB
ALBUMIN SERPL-MCNC: 4.6 G/DL (ref 3.8–4.8)
ALBUMIN/GLOB SERPL: 1.4 {RATIO} (ref 1.2–2.2)
ALP SERPL-CCNC: 65 IU/L (ref 44–121)
ALT SERPL-CCNC: 13 IU/L (ref 0–32)
AST SERPL-CCNC: 14 IU/L (ref 0–40)
BASOPHILS # BLD AUTO: 0.1 X10E3/UL (ref 0–0.2)
BASOPHILS NFR BLD AUTO: 1 %
BILIRUB SERPL-MCNC: <0.2 MG/DL (ref 0–1.2)
BUN SERPL-MCNC: 23 MG/DL (ref 6–24)
BUN/CREAT SERPL: 26 (ref 9–23)
CALCIUM SERPL-MCNC: 9.9 MG/DL (ref 8.7–10.2)
CHLORIDE SERPL-SCNC: 99 MMOL/L (ref 96–106)
CHOLEST SERPL-MCNC: 195 MG/DL (ref 100–199)
CK SERPL-CCNC: 58 U/L (ref 32–182)
CO2 SERPL-SCNC: 21 MMOL/L (ref 20–29)
CREAT SERPL-MCNC: 0.88 MG/DL (ref 0.57–1)
EOSINOPHIL # BLD AUTO: 0.2 X10E3/UL (ref 0–0.4)
EOSINOPHIL NFR BLD AUTO: 4 %
ERYTHROCYTE [DISTWIDTH] IN BLOOD BY AUTOMATED COUNT: 13.6 % (ref 11.7–15.4)
EST. AVERAGE GLUCOSE BLD GHB EST-MCNC: 229 MG/DL
GLOBULIN SER CALC-MCNC: 3.2 G/DL (ref 1.5–4.5)
GLUCOSE SERPL-MCNC: 262 MG/DL (ref 65–99)
HBA1C MFR BLD: 9.6 % (ref 4.8–5.6)
HCT VFR BLD AUTO: 45.4 % (ref 34–46.6)
HDLC SERPL-MCNC: 64 MG/DL
HGB BLD-MCNC: 15 G/DL (ref 11.1–15.9)
IMM GRANULOCYTES # BLD AUTO: 0 X10E3/UL (ref 0–0.1)
IMM GRANULOCYTES NFR BLD AUTO: 1 %
LDLC SERPL CALC-MCNC: 106 MG/DL (ref 0–99)
LYMPHOCYTES # BLD AUTO: 1.6 X10E3/UL (ref 0.7–3.1)
LYMPHOCYTES NFR BLD AUTO: 23 %
MCH RBC QN AUTO: 30.7 PG (ref 26.6–33)
MCHC RBC AUTO-ENTMCNC: 33 G/DL (ref 31.5–35.7)
MCV RBC AUTO: 93 FL (ref 79–97)
MONOCYTES # BLD AUTO: 0.7 X10E3/UL (ref 0.1–0.9)
MONOCYTES NFR BLD AUTO: 10 %
NEUTROPHILS # BLD AUTO: 4.1 X10E3/UL (ref 1.4–7)
NEUTROPHILS NFR BLD AUTO: 61 %
PLATELET # BLD AUTO: 284 X10E3/UL (ref 150–450)
POTASSIUM SERPL-SCNC: 4.4 MMOL/L (ref 3.5–5.2)
PROT SERPL-MCNC: 7.8 G/DL (ref 6–8.5)
RBC # BLD AUTO: 4.89 X10E6/UL (ref 3.77–5.28)
SODIUM SERPL-SCNC: 141 MMOL/L (ref 134–144)
TRIGL SERPL-MCNC: 147 MG/DL (ref 0–149)
VLDLC SERPL CALC-MCNC: 25 MG/DL (ref 5–40)
WBC # BLD AUTO: 6.6 X10E3/UL (ref 3.4–10.8)

## 2022-06-12 DIAGNOSIS — E11.9 TYPE 2 DIABETES MELLITUS WITHOUT COMPLICATION, WITHOUT LONG-TERM CURRENT USE OF INSULIN (HCC): ICD-10-CM

## 2022-06-20 NOTE — TELEPHONE ENCOUNTER
[address] left a voice message following up on refill request.     [phone]    ---------------------------------------------    Last visit 03/28/2022 MD Betty Hester   Next appointment 6 months (09/2022)   Previous refill encounter(s)   12/12/2021:  - Januvia #90 with 1 refill,  - [facility] #90 with 1 refill.      For Pharmacy Admin Tracking Only     Intervention Detail: New Rx: 2, reason: Patient Preference   Time Spent (min): 5        Requested Prescriptions     Pending Prescriptions Disp Refills    Januvia 100 mg tablet [Pharmacy Med Name: Januvia 100 MG Oral Tablet] 90 Tablet 0     Sig: Take 1 tablet by mouth once daily    dapagliflozin (Farxiga) 10 mg tab tablet [Pharmacy Med Name: Farxiga 10 MG Oral Tablet] 90 Tablet 0     Sig: Take 1 tablet by mouth once daily

## 2022-06-25 RX ORDER — SITAGLIPTIN 100 MG/1
TABLET, FILM COATED ORAL
Qty: 90 TABLET | Refills: 1 | Status: SHIPPED | OUTPATIENT
Start: 2022-06-25

## 2022-06-25 NOTE — TELEPHONE ENCOUNTER
Refill request(s) Sariah Petersen. Refill protocol details (computer-generated) reviewed, as available.

## 2022-07-14 RX ORDER — METFORMIN HYDROCHLORIDE 500 MG/1
1000 TABLET, EXTENDED RELEASE ORAL 2 TIMES DAILY WITH MEALS
Qty: 360 TABLET | Refills: 1 | Status: SHIPPED | OUTPATIENT
Start: 2022-07-14

## 2022-07-14 NOTE — TELEPHONE ENCOUNTER
Refill request(s) approved--metformin--noted by nursing taking 2 x 500mg BID. Prior script was 500mg AM and 1,000mg PM.      Refill protocol details (computer-generated) reviewed, as available. Requested Prescriptions     Signed Prescriptions Disp Refills    metFORMIN ER (GLUCOPHAGE XR) 500 mg tablet 360 Tablet 1     Sig: Take 2 Tablets by mouth two (2) times daily (with meals).      Authorizing Provider: Destiny Hu

## 2022-08-21 DIAGNOSIS — I10 ESSENTIAL HYPERTENSION: ICD-10-CM

## 2022-08-21 DIAGNOSIS — E11.9 TYPE 2 DIABETES MELLITUS WITHOUT COMPLICATION, WITHOUT LONG-TERM CURRENT USE OF INSULIN (HCC): ICD-10-CM

## 2022-08-30 NOTE — TELEPHONE ENCOUNTER
Last visit 03/28/2022 MD Marisol Chatterjee   Next appointment Nothing scheduled   Previous refill encounter(s)   02/06/2022 Prinivil #90 with 1 refill.     For Pharmacy Admin Tracking Only    Intervention Detail: New Rx: 1, reason: Patient Preference  Time Spent (min): 5      Requested Prescriptions     Pending Prescriptions Disp Refills    lisinopriL (PRINIVIL, ZESTRIL) 10 mg tablet [Pharmacy Med Name: Lisinopril 10 MG Oral Tablet] 30 Tablet 0     Sig: Take 1 tablet by mouth once daily

## 2022-08-31 RX ORDER — LISINOPRIL 10 MG/1
TABLET ORAL
Qty: 90 TABLET | Refills: 1 | Status: SHIPPED | OUTPATIENT
Start: 2022-08-31

## 2022-11-22 DIAGNOSIS — E11.9 TYPE 2 DIABETES MELLITUS WITHOUT COMPLICATION, WITHOUT LONG-TERM CURRENT USE OF INSULIN (HCC): ICD-10-CM

## 2022-11-22 RX ORDER — METFORMIN HYDROCHLORIDE 500 MG/1
1000 TABLET, EXTENDED RELEASE ORAL 2 TIMES DAILY WITH MEALS
Qty: 360 TABLET | Refills: 0 | Status: CANCELLED | OUTPATIENT
Start: 2022-11-22

## 2022-11-22 NOTE — TELEPHONE ENCOUNTER
Last visit 03/28/2022 MD Alvin Oliveira   Next appointment Nothing scheduled   Previous refill encounter(s)   07/14/2022 Glucophage-XR #360 with 1 refill. For Pharmacy Admin Tracking Only    Intervention Detail: New Rx: 1, reason: Patient Preference  Time Spent (min): 5        Requested Prescriptions     Pending Prescriptions Disp Refills    metFORMIN ER (GLUCOPHAGE XR) 500 mg tablet 360 Tablet 0     Sig: Take 2 Tablets by mouth two (2) times daily (with meals).

## 2022-11-22 NOTE — TELEPHONE ENCOUNTER
Last visit 03/28/2022 MD Marya Burrell   Next appointment Nothing scheduled   Previous refill encounter(s)   06/25/2022 Angel Cea #90 with 1 refill.      For Pharmacy Admin Tracking Only    Intervention Detail: New Rx: 1, reason: Patient Preference  Time Spent (min): 5      Requested Prescriptions     Pending Prescriptions Disp Refills    dapagliflozin (Farxiga) 10 mg tab tablet 90 Tablet 0     Sig: Take 1 tablet by mouth once daily

## 2022-11-22 NOTE — TELEPHONE ENCOUNTER
[address] left a voice message following up on the refill for the Thomasuvia. Please review and prescribe if appropriate. Thank you. [phone]  ----------------------------------------------------  Last visit 03/28/2022 MD Daly Rodriguez   Next appointment Nothing scheduled   Previous refill encounter(s)   06/25/2022 Januvia #90 with 1 refill. For Pharmacy Admin Tracking Only    Intervention Detail: New Rx: 1, reason: Patient Preference  Time Spent (min): 5      Requested Prescriptions     Pending Prescriptions Disp Refills    SITagliptin (Thomasuvia) 100 mg tablet 90 Tablet 0     Sig: Take 1 Tablet by mouth daily.

## 2022-11-25 DIAGNOSIS — E11.9 TYPE 2 DIABETES MELLITUS WITHOUT COMPLICATION, WITHOUT LONG-TERM CURRENT USE OF INSULIN (HCC): ICD-10-CM

## 2022-12-08 RX ORDER — METFORMIN HYDROCHLORIDE 500 MG/1
TABLET, EXTENDED RELEASE ORAL
Qty: 360 TABLET | Refills: 1 | Status: SHIPPED | OUTPATIENT
Start: 2022-12-08

## 2022-12-13 DIAGNOSIS — E11.9 TYPE 2 DIABETES MELLITUS WITHOUT COMPLICATION, WITHOUT LONG-TERM CURRENT USE OF INSULIN (HCC): ICD-10-CM

## 2023-02-02 RX ORDER — SITAGLIPTIN 100 MG/1
TABLET, FILM COATED ORAL
Qty: 90 TABLET | Refills: 0 | OUTPATIENT
Start: 2023-02-02

## 2023-02-14 DIAGNOSIS — E11.9 TYPE 2 DIABETES MELLITUS WITHOUT COMPLICATION, WITHOUT LONG-TERM CURRENT USE OF INSULIN (HCC): ICD-10-CM

## 2023-02-14 DIAGNOSIS — I10 ESSENTIAL HYPERTENSION: ICD-10-CM

## 2023-02-19 RX ORDER — LISINOPRIL 10 MG/1
TABLET ORAL
Qty: 90 TABLET | Refills: 1 | Status: SHIPPED | OUTPATIENT
Start: 2023-02-19

## 2023-02-26 ENCOUNTER — OFFICE VISIT (OUTPATIENT)
Dept: URGENT CARE | Age: 48
End: 2023-02-26

## 2023-02-26 VITALS
TEMPERATURE: 98.5 F | DIASTOLIC BLOOD PRESSURE: 78 MMHG | SYSTOLIC BLOOD PRESSURE: 138 MMHG | RESPIRATION RATE: 28 BRPM | HEART RATE: 146 BPM | OXYGEN SATURATION: 97 %

## 2023-02-26 DIAGNOSIS — R06.00 DYSPNEA, UNSPECIFIED TYPE: Primary | ICD-10-CM

## 2023-02-26 DIAGNOSIS — R00.0 TACHYCARDIA: ICD-10-CM

## 2023-02-26 DIAGNOSIS — J18.9 PNEUMONIA OF LEFT LUNG DUE TO INFECTIOUS ORGANISM, UNSPECIFIED PART OF LUNG: ICD-10-CM

## 2023-02-26 LAB
EXP DATE SOLUTION: NORMAL
EXP DATE SWAB: NORMAL
FLUAV+FLUBV AG NOSE QL IA.RAPID: NEGATIVE
FLUAV+FLUBV AG NOSE QL IA.RAPID: NEGATIVE
LOT NUMBER SOLUTION: NORMAL
LOT NUMBER SWAB: NORMAL
S PYO AG THROAT QL: NEGATIVE
SARS-COV-2 RNA POC: NEGATIVE
VALID INTERNAL CONTROL?: YES
VALID INTERNAL CONTROL?: YES

## 2023-02-26 NOTE — PROGRESS NOTES
HPI     Pt presents with complaints of congestion and cough (x 4 days), fevers Tmax 100.9 and SOB for 1 day. Walking worsens SOB. Denies any CP or wheezing. Taking otc cough and cold meds with mild relief. On imuran for crohn's disease. Denies hx asthma or lung disease. Home covid test negative. Past Medical History:   Diagnosis Date    Anemia     Bowel disease     Chrohn's    Diabetes ([facility] [address]     GERD (gastroesophageal reflux disease)     History of colonoscopy with polypectomy 03/12/2020    Dr. Oliver Eli: Surveillance for UC. Polypectomy (1cm in sigmoid colon)--repeat 3yrs.         Past Surgical History:   Procedure Laterality Date    COLONOSCOPY N/A 6/30/2016    COLONOSCOPY performed by Gabriel Daniels MD at [facility] ENDOSCOPY    SIGMOIDOSCOPY,BIOPSY  6/30/2016         UPPER GI ENDOSCOPY,BIOPSY  6/30/2016              Family History   Problem Relation Age of Onset    Hypertension Mother     Heart Disease Father     Stroke Father     Hypertension Father     Diabetes Father     Cancer Father         skin    Other Father         PUD    No Known Problems Sister     Diabetes Brother     Diabetes Brother     No Known Problems Brother     Drug Abuse Brother     Cancer Maternal Grandmother         unsure of type    Diabetes Paternal Aunt     Diabetes Paternal Uncle     Diabetes Paternal Grandfather         Social History     Socioeconomic History    Marital status: SINGLE     Spouse name: Not on file    Number of children: Not on file    Years of education: Not on file    Highest education level: Not on file   Occupational History    Not on file   Tobacco Use    Smoking status: Never    Smokeless tobacco: Never   Substance and Sexual Activity    Alcohol use: No    Drug use: No    Sexual activity: Not on file   Other Topics Concern    Not on file   Social History Narrative    Not on file     Social Determinants of Health     Financial Resource Strain: Not on file   Food Insecurity: Not on file   Transportation Needs: Not on file   Physical Activity: Not on file   Stress: Not on file   Social Connections: Not on file   Intimate Partner Violence: Not on file   Housing Stability: Not on file                ALLERGIES: Patient has no known allergies. Review of Systems   Constitutional:  Positive for fatigue and fever. HENT:  Positive for congestion. Negative for ear pain and sore throat. Respiratory:  Positive for cough and shortness of breath. Negative for wheezing. Cardiovascular:  Negative for chest pain. Gastrointestinal:  Negative for abdominal pain, diarrhea, nausea and vomiting. Vitals:    02/26/23 1013 02/26/23 1018 02/26/23 1047   BP: (!) 149/91 138/78    Pulse: (!) 145  (!) 146   Resp: 28     Temp: 98.5 °F (36.9 °C)     SpO2: 98%  97%       Physical Exam  Constitutional:       General: She is not in acute distress. Appearance: She is well-developed. She is ill-appearing. She is not toxic-appearing. HENT:      Head: Normocephalic and atraumatic. Right Ear: Tympanic membrane, ear canal and external ear normal.      Left Ear: Tympanic membrane, ear canal and external ear normal.      Nose: Congestion present. Mouth/Throat:      Mouth: Mucous membranes are moist.      Pharynx: Oropharynx is clear. Eyes:      Extraocular Movements: Extraocular movements intact. Conjunctiva/sclera: Conjunctivae normal.      Pupils: Pupils are equal, round, and reactive to light. Cardiovascular:      Rate and Rhythm: Regular rhythm. Tachycardia present. Heart sounds: Normal heart sounds. Pulmonary:      Effort: Respiratory distress present. Breath sounds: No wheezing. Comments: Diminished breath sounds on left, clear otherwise. Tachypnea and labored breathing. Musculoskeletal:      Cervical back: Normal range of motion and neck supple. Lymphadenopathy:      Cervical: No cervical adenopathy. Skin:     General: Skin is warm and dry. Neurological:      General: No focal deficit present. Mental Status: She is alert and oriented to person, place, and time. Results for orders placed or performed in visit on 02/26/23   AMB POC COVID-19 COV   Result Value Ref Range    SARS-COV-2 RNA POC Negative Negative    LOT NUMBER SWAB [id number]     EXP DATE SWAB 11/24/2023     LOT NUMBER SOLUTION [id number]     EXP DATE SOLUTION 1/19/2024    AMB POC INFLUENZA A  AND B REAL-TIME RT-PCR   Result Value Ref Range    VALID INTERNAL CONTROL POC Yes     Influenza A Ag POC Negative Negative    Influenza B Ag POC Negative Negative   AMB POC STREP A DNA, AMP PROBE   Result Value Ref Range    VALID INTERNAL CONTROL POC Yes     Group A Strep Ag Negative Negative     XR Results (most recent):  Results from Appointment encounter on 02/26/23    XR CHEST PA LAT    Narrative  EXAM: XR CHEST PA LAT    INDICATION: Cough and malaise    COMPARISON: Chest views on 6/24/2016. TECHNIQUE: PA and lateral chest views    FINDINGS: The cardiomediastinal and hilar contours are within normal limits. The  pulmonary vasculature is within normal limits. Left lung opacities are new. Right lung is clear. Pleural spaces are clear. The  visualized bones and upper abdomen are age-appropriate. Impression  Left pneumonia. Recommend followup PA and lateral chest views in 8-10 weeks to  ensure resolution. ICD-10-CM ICD-9-CM   1. Dyspnea, unspecified type  R06.00 786.09   2. Tachycardia  R00.0 785.0   3. Pneumonia of left lung due to infectious organism, unspecified part of lung  J18.9 486       Orders Placed This Encounter    XR CHEST PA LAT     Standing Status:   Future     Number of Occurrences:   1     Standing Expiration Date:   3/26/2024     Order Specific Question:   Is Patient Pregnant? Answer:   No     Order Specific Question:   Reason for Exam     Answer:   cough    AMB POC COVID-19 COV     Order Specific Question:   Is this test for diagnosis or screening?      Answer:   Diagnosis of ill patient     Order Specific Question: Symptomatic for COVID-19 as defined by CDC? Answer:   Yes     Order Specific Question:   Date of Symptom Onset     Answer:   2/22/2023     Order Specific Question:   Hospitalized for COVID-19? Answer:   No     Order Specific Question:   Admitted to ICU for COVID-19? Answer:   No     Order Specific Question:   Employed in healthcare setting? Answer:   No     Order Specific Question:   Resident in a congregate (group) care setting? Answer:   No     Order Specific Question:   Pregnant? Answer:   No     Order Specific Question:   Previously tested for COVID-19? Answer: Yes    AMB POC INFLUENZA A  AND B REAL-TIME RT-PCR    AMB POC STREP A DNA, AMP PROBE        Pt appears acutely ill, possible sepsis. Recommend further evaluation in ED. Pt in agreement and plans to go to [facility]. Declines EMS transport. If signs and symptoms become worse the pt is to go to the ER. Miya Sharma NP       [facility]    EKG      Date/Time: 2/26/2023 3:10 PM  Performed by: Andrea Oviedo NP  Authorized by:  Andrea Oviedo NP   Comparison: compared with previous ECG from 6/28/2016  Similar to previous ECG  Rhythm: sinus tachycardia  BPM: 147  Clinical impression: abnormal ECG  Comments: Sinus tachycardia at 147  Low voltage qrs  Left anterior fascicular block  Cannot rule out inferior infarct (masked by fasicular block?), age undertermined  Abnormal ECG  Similar to previous EKG

## 2023-03-13 ENCOUNTER — OFFICE VISIT (OUTPATIENT)
Dept: URGENT CARE | Age: 48
End: 2023-03-13

## 2023-03-13 VITALS
WEIGHT: 250 LBS | TEMPERATURE: 97.7 F | HEART RATE: 85 BPM | SYSTOLIC BLOOD PRESSURE: 142 MMHG | DIASTOLIC BLOOD PRESSURE: 68 MMHG | RESPIRATION RATE: 18 BRPM | OXYGEN SATURATION: 96 % | BODY MASS INDEX: 36.92 KG/M2

## 2023-03-13 DIAGNOSIS — S82.62XA CLOSED DISPLACED FRACTURE OF LATERAL MALLEOLUS OF LEFT FIBULA, INITIAL ENCOUNTER: ICD-10-CM

## 2023-03-13 DIAGNOSIS — S99.912A INJURY OF LEFT ANKLE, INITIAL ENCOUNTER: Primary | ICD-10-CM

## 2023-03-13 PROCEDURE — S9083 URGENT CARE CENTER GLOBAL: HCPCS | Performed by: FAMILY MEDICINE

## 2023-03-13 RX ORDER — PANTOPRAZOLE SODIUM 40 MG/1
40 TABLET, DELAYED RELEASE ORAL DAILY
COMMUNITY

## 2023-03-13 RX ORDER — INSULIN GLARGINE 100 [IU]/ML
INJECTION, SOLUTION SUBCUTANEOUS
COMMUNITY

## 2023-03-13 NOTE — PROGRESS NOTES
Ankle Injury  This is a new problem. The current episode started more than 2 days ago (3/9/23). The problem has not changed since onset. Associated symptoms comments: She slipped on the kitchen floor and had left left leg bend backward with ankle sprain - felt sharp pain in left ankle -  Moderate pain with difficulty walking and putting weight     Developed swelling  and bruise over past 3 days - no treatment seek before today . The symptoms are aggravated by bending, walking and standing. The symptoms are relieved by laying down and rest (elevation). She has tried nothing for the symptoms. Past Medical History:   Diagnosis Date    Anemia     Bowel disease     Chrohn's    Diabetes ([facility] [address]     GERD (gastroesophageal reflux disease)     History of colonoscopy with polypectomy 03/12/2020    Dr. Jerardo Barrera: Surveillance for UC. Polypectomy (1cm in sigmoid colon)--repeat 3yrs.         Past Surgical History:   Procedure Laterality Date    COLONOSCOPY N/A 6/30/2016    COLONOSCOPY performed by Derick Saucedo MD at [facility] ENDOSCOPY    SIGMOIDOSCOPY,BIOPSY  6/30/2016         UPPER GI ENDOSCOPY,BIOPSY  6/30/2016              Family History   Problem Relation Age of Onset    Hypertension Mother     Heart Disease Father     Stroke Father     Hypertension Father     Diabetes Father     Cancer Father         skin    Other Father         PUD    No Known Problems Sister     Diabetes Brother     Diabetes Brother     No Known Problems Brother     Drug Abuse Brother     Cancer Maternal Grandmother         unsure of type    Diabetes Paternal Aunt     Diabetes Paternal Uncle     Diabetes Paternal Grandfather         Social History     Socioeconomic History    Marital status: SINGLE     Spouse name: Not on file    Number of children: Not on file    Years of education: Not on file    Highest education level: Not on file   Occupational History    Not on file   Tobacco Use    Smoking status: Never    Smokeless tobacco: Never   Substance and Sexual Activity    Alcohol use: No    Drug use: No    Sexual activity: Not on file   Other Topics Concern    Not on file   Social History Narrative    Not on file     Social Determinants of Health     Financial Resource Strain: Not on file   Food Insecurity: Not on file   Transportation Needs: Not on file   Physical Activity: Not on file   Stress: Not on file   Social Connections: Not on file   Intimate Partner Violence: Not on file   Housing Stability: Not on file                ALLERGIES: Patient has no known allergies. Review of Systems   Musculoskeletal:  Positive for gait problem and joint swelling. Skin:  Positive for color change. All other systems reviewed and are negative. Vitals:    03/13/23 0833   BP: (!) 142/68   Pulse: 85   Resp: 18   Temp: 97.7 °F (36.5 °C)   SpO2: 96%   Weight: 250 lb (113.4 kg)       Physical Exam  Vitals and nursing note reviewed. Constitutional:       General: She is not in acute distress. Appearance: Normal appearance. She is not ill-appearing. Musculoskeletal:      Left ankle: Swelling and ecchymosis present. Tenderness present over the lateral malleolus. Decreased range of motion. Feet:    Neurological:      Mental Status: She is alert. MDM    Procedures        ICD-10-CM ICD-9-CM    1. Injury of left ankle, initial encounter  S99.912A 959.7 XR ANKLE LT MIN 3 V      BRACE BOOT      2. Closed displaced fracture of lateral malleolus of left fibula, initial encounter  S82. 62XA 824.2         Walking boot with ace wrap - limit standing and walking     See orthopedic ASAP    Tylenol and Aleve as needed for pain     No orders of the defined types were placed in this encounter. Results for orders placed or performed in visit on 03/13/23   XR ANKLE LT MIN 3 V    Narrative    EXAM: XR ANKLE LT MIN 3 V    INDICATION: . Injury pain    COMPARISON: None.     FINDINGS: Three views of the left ankle demonstrate subacute minimally displaced  fracture of the distal fibula with associated soft tissue swelling. Mild DJD. Plantar calcaneal spur      Impression    Fracture distal fibula. The patients condition was discussed with the patient and they understand. The patient is to follow up with primary care doctor. If signs and symptoms become worse the pt is to go to the ER. The patient is to take medications as prescribed.

## 2023-03-13 NOTE — PATIENT INSTRUCTIONS
Walking boot with ace wrap - limit standing and walking     See orthopedic ASAP    Tylenol and Aleve as needed for pain

## 2023-03-17 RX ORDER — ACETAMINOPHEN 500 MG
1000 TABLET ORAL
COMMUNITY

## 2023-03-17 NOTE — PERIOP NOTES
Ph assessment complete - patient had recent admission to [address] 2/26 - 3/2 for pneumonia, low K+, low mag. Sent fax req for notes, EKG, & labs.     Having anesthesia review EKG done 2/26/23 & comparison EKG 2016

## 2023-03-20 ENCOUNTER — VIRTUAL VISIT (OUTPATIENT)
Dept: INTERNAL MEDICINE CLINIC | Age: 48
End: 2023-03-20

## 2023-03-20 ENCOUNTER — ANESTHESIA EVENT (OUTPATIENT)
Dept: SURGERY | Age: 48
End: 2023-03-20
Payer: COMMERCIAL

## 2023-03-20 DIAGNOSIS — S82.892A CLOSED FRACTURE OF MALLEOLUS OF LEFT ANKLE, INITIAL ENCOUNTER: ICD-10-CM

## 2023-03-20 DIAGNOSIS — E11.65 TYPE 2 DIABETES MELLITUS WITH HYPERGLYCEMIA, WITHOUT LONG-TERM CURRENT USE OF INSULIN (HCC): Primary | ICD-10-CM

## 2023-03-20 DIAGNOSIS — Z86.39 HISTORY OF KETOACIDOSIS: ICD-10-CM

## 2023-03-20 DIAGNOSIS — I10 ESSENTIAL HYPERTENSION: ICD-10-CM

## 2023-03-20 RX ORDER — PEN NEEDLE, DIABETIC 31 GX5/16"
NEEDLE, DISPOSABLE MISCELLANEOUS SEE ADMIN INSTRUCTIONS
COMMUNITY
Start: 2023-03-02

## 2023-03-20 NOTE — PROGRESS NOTES
Rm: VV      Chief Complaint   Patient presents with    Follow-up       There were no vitals taken for this visit. 1. Have you been to the ER, urgent care clinic since your last visit? Hospitalized since your last visit? Yes Where: [address] [facility]    2. Have you seen or consulted any other health care providers outside of the [address] [place] [address] since your last visit? Include any pap smears or colon screening. No        Prior to Admission medications    Medication Sig Start Date End Date Taking? Authorizing Provider   BD Ultra-Fine Short Pen Needle 31 gauge x 5/16\" ndle See Admin Instructions. 3/2/23  Yes Provider, Historical   acetaminophen (TYLENOL) 500 mg tablet Take 1,000 mg by mouth every six (6) hours as needed for Pain. Yes Provider, Historical   insulin glargine (LANTUS) 100 unit/mL injection by SubCUTAneous route nightly. Yes Provider, Historical   pantoprazole (PROTONIX) 40 mg tablet Take 40 mg by mouth daily. Yes Provider, Historical   metFORMIN ER (GLUCOPHAGE XR) 500 mg tablet TAKE 2 TABLETS BY MOUTH TWICE DAILY WITH MEALS 12/8/22  Yes Chioma Novak MD   mesalamine ER (APRISO) 0.375 gram 24 hour capsule Take 1.5 g by mouth daily. Yes Provider, Historical   cetirizine (ZYRTEC) 10 mg tablet Take  by mouth daily as needed. Yes Provider, Historical   multivitamins chew Take  by mouth daily. Yes Provider, Historical   azaTHIOprine 100 mg tab 100 mg daily.  7/15/16  Yes Provider, Historical   lisinopriL (PRINIVIL, ZESTRIL) 10 mg tablet Take 1 tablet by mouth once daily  Patient not taking: No sig reported 2/19/23   Chioma Novak MD

## 2023-03-20 NOTE — PROGRESS NOTES
Shant Avalos (: 1975) is a 49 y.o. female, {established vs new:56019} patient, here for evaluation of the following chief complaint(s)--see below:    Shant Avalos is a 49 y.o. female who was seen by synchronous (real-time) audio-video technology on 3/20/2023. Consent: Shant Avalos, who was seen by synchronous (real-time) audio-video technology, and/or her healthcare decision maker, is aware that this patient-initiated, Telehealth encounter on 3/20/2023 is a billable service, with coverage as determined by her insurance carrier. She is aware that she may receive a bill and has provided verbal consent to proceed: Yes. I was in the office while conducting this encounter. Assessment & Plan:   Diagnoses and all orders for this visit:    {Assessment and Plan:71778}        AVS:  []  Available to patient in Xmyboxhart after visit signed. []  Mailed to patient after visit. []  Not sent to patient after visit. No future appointments. --Updated future visits after patient check-out. Subjective:   Shant Avalos was seen for:  Chief Complaint   Patient presents with    Follow-up         Notes:  LOV here was office visit 2022. She was due for follow-up but had a fall at home and sustained ankle fracture that she is having repaired tomorrow with Dr. Diane Paulino. Lab Results   Component Value Date/Time    Hemoglobin A1c 9.6 (H) 2022 12:00 AM    Hemoglobin A1c (POC) 7.0 (A) 2017 08:29 AM     She has ha dinterim A1c [facility] hospitalization . She had DM Ketoacidosis there. She has her surgery at [facility] tomorrow. May be admitted up to 2wks, then rehab after that. She is in 2nd floor apartment alone, and wants no weight-bearing x 8-10 weeks. She was placed on insulin after hospitalization. She had stopped her Farxiga inpt, and changed to Lantus 20units daily at night. This was only for one month.     She notes her HGM has improved, and ortho OK with them for surgery. She is in low 200's. She is continuing for now, but only one day until surgery tomorrow    She did receive Lantus as a Basaglar pen--she just finished 1 or 2 of her pens yesterday and will start 2nd tonight. She has VV with GI early April to follow-up on CT finding of thickened duodenum. She is due for 3yr colonosocpy, which has postponed from fracture also. She is aware of plan to do UGI with above colonoscopy--when able to ambulate after fracture. Nursing screenings reviewed by provider at visit. No Known Allergies    Prior to Admission medications    Medication Sig Start Date End Date Taking? Authorizing Provider   BD Ultra-Fine Short Pen Needle 31 gauge x 5/16\" ndle See Admin Instructions. 3/2/23  Yes Provider, Historical   acetaminophen (TYLENOL) 500 mg tablet Take 1,000 mg by mouth every six (6) hours as needed for Pain. Yes Provider, Historical   insulin glargine (LANTUS) 100 unit/mL injection by SubCUTAneous route nightly. Yes Provider, Historical   pantoprazole (PROTONIX) 40 mg tablet Take 40 mg by mouth daily. Yes Provider, Historical   metFORMIN ER (GLUCOPHAGE XR) 500 mg tablet TAKE 2 TABLETS BY MOUTH TWICE DAILY WITH MEALS 12/8/22  Yes Jodi Amin MD   mesalamine ER (APRISO) 0.375 gram 24 hour capsule Take 1.5 g by mouth daily. Yes Provider, Historical   cetirizine (ZYRTEC) 10 mg tablet Take  by mouth daily as needed. Yes Provider, Historical   multivitamins chew Take  by mouth daily. Yes Provider, Historical   azaTHIOprine 100 mg tab 100 mg daily. 7/15/16  Yes Provider, Historical   lisinopriL (PRINIVIL, ZESTRIL) 10 mg tablet Take 1 tablet by mouth once daily  Patient not taking: No sig reported 2/19/23   Jodi Amin MD         ROS    PHYSICAL EXAMINATION:    Vital Signs: There were no vitals taken for this visit. ***  No flowsheet data found.  ***    Constitutional: [x] Appears well-developed and well-nourished [x] No apparent distress      Mental status: [x] Alert and awake  [x] Oriented [x] Able to follow commands       Eyes:   EOM    [x]  Normal      Sclera  [x]  Normal              Discharge [x]  None visible       HENT: [x] Normocephalic, atraumatic    [x] Mouth/Throat: Mucous membranes are moist    External Ears [x] Normal      Neck: [x] No visualized mass     Pulmonary/Chest: [x] Respiratory effort normal   [x] No visualized signs of difficulty breathing or respiratory distress    Musculoskeletal:  [x] Normal range of motion of neck    Neurological:        [x] No Facial Asymmetry (Cranial nerve 7 motor function) (limited exam due to video visit)          [x] No gaze palsy     Skin:        [x] No significant exanthematous lesions or discoloration noted on facial skin             Psychiatric:       [x] Normal Affect       Other pertinent observable physical exam findings:  None. We discussed the expected course, resolution and complications of the diagnosis(es) in detail. Medication risks, benefits, costs, interactions, and alternatives were discussed as indicated. I advised her to contact the office if her condition worsens, changes or fails to improve as anticipated. She expressed understanding with the diagnosis(es) and plan. Cesar Martinez is a 49 y.o. female who was evaluated by a video visit encounter for concerns as above. Cesar Martinez is being evaluated by a Virtual Visit (video visit) encounter to address concerns as mentioned above. A caregiver was present when appropriate. Due to this being a TeleHealth encounter (During GNR-14 public health emergency), evaluation of the following organ systems was limited: Vitals/Constitutional/EENT/Resp/CV/GI//MS/Neuro/Skin/Heme-Lymph-Imm.   Pursuant to the emergency declaration under the [facility], [address] [place] [address] authority and the [employer] Act, this Virtual Visit was conducted with patient's (and/or legal guardian's) consent, to reduce the patient's risk of exposure to COVID-19 and provide necessary medical care. The patient (and/or legal guardian) has also been advised to contact this office for worsening conditions or problems, and seek emergency medical treatment and/or call 911 if deemed necessary. Patient identification was verified at the start of the visit: YES. Services were provided through a video synchronous discussion virtually to substitute for in-person clinic visit. Patient was located at their individual home (or other location as per patient preference). Provider was located in medical office. An electronic signature was used to authenticate this note.   -- Dany Tineo MD

## 2023-03-21 ENCOUNTER — APPOINTMENT (OUTPATIENT)
Dept: GENERAL RADIOLOGY | Age: 48
DRG: 493 | End: 2023-03-21
Attending: STUDENT IN AN ORGANIZED HEALTH CARE EDUCATION/TRAINING PROGRAM
Payer: COMMERCIAL

## 2023-03-21 ENCOUNTER — ANESTHESIA (OUTPATIENT)
Dept: SURGERY | Age: 48
End: 2023-03-21
Payer: COMMERCIAL

## 2023-03-21 ENCOUNTER — HOSPITAL ENCOUNTER (INPATIENT)
Age: 48
LOS: 7 days | Discharge: NURSING, REHAB OR LONG-TERM CARE | DRG: 493 | End: 2023-03-28
Attending: STUDENT IN AN ORGANIZED HEALTH CARE EDUCATION/TRAINING PROGRAM | Admitting: STUDENT IN AN ORGANIZED HEALTH CARE EDUCATION/TRAINING PROGRAM
Payer: COMMERCIAL

## 2023-03-21 DIAGNOSIS — Z98.890 S/P ORIF (OPEN REDUCTION INTERNAL FIXATION) FRACTURE: ICD-10-CM

## 2023-03-21 DIAGNOSIS — Z79.4 TYPE 2 DIABETES MELLITUS WITH HYPERGLYCEMIA, WITH LONG-TERM CURRENT USE OF INSULIN (HCC): Primary | ICD-10-CM

## 2023-03-21 DIAGNOSIS — E11.65 TYPE 2 DIABETES MELLITUS WITH HYPERGLYCEMIA, WITH LONG-TERM CURRENT USE OF INSULIN (HCC): Primary | ICD-10-CM

## 2023-03-21 DIAGNOSIS — Z87.81 S/P ORIF (OPEN REDUCTION INTERNAL FIXATION) FRACTURE: ICD-10-CM

## 2023-03-21 PROBLEM — S82.892A CLOSED LEFT ANKLE FRACTURE: Status: ACTIVE | Noted: 2023-03-21

## 2023-03-21 LAB
ANION GAP SERPL CALC-SCNC: 5 MMOL/L (ref 5–15)
BUN SERPL-MCNC: 17 MG/DL (ref 6–20)
BUN/CREAT SERPL: 22 (ref 12–20)
CALCIUM SERPL-MCNC: 9.8 MG/DL (ref 8.5–10.1)
CHLORIDE SERPL-SCNC: 101 MMOL/L (ref 97–108)
CO2 SERPL-SCNC: 29 MMOL/L (ref 21–32)
CREAT SERPL-MCNC: 0.78 MG/DL (ref 0.55–1.02)
ERYTHROCYTE [DISTWIDTH] IN BLOOD BY AUTOMATED COUNT: 15.2 % (ref 11.5–14.5)
GLUCOSE BLD STRIP.AUTO-MCNC: 198 MG/DL (ref 65–117)
GLUCOSE BLD STRIP.AUTO-MCNC: 226 MG/DL (ref 65–117)
GLUCOSE SERPL-MCNC: 228 MG/DL (ref 65–100)
HCG UR QL: NEGATIVE
HCT VFR BLD AUTO: 42.2 % (ref 35–47)
HGB BLD-MCNC: 13.5 G/DL (ref 11.5–16)
MCH RBC QN AUTO: 30.1 PG (ref 26–34)
MCHC RBC AUTO-ENTMCNC: 32 G/DL (ref 30–36.5)
MCV RBC AUTO: 94.2 FL (ref 80–99)
NRBC # BLD: 0 K/UL (ref 0–0.01)
NRBC BLD-RTO: 0 PER 100 WBC
PLATELET # BLD AUTO: 263 K/UL (ref 150–400)
PMV BLD AUTO: 10.2 FL (ref 8.9–12.9)
POTASSIUM SERPL-SCNC: 4.2 MMOL/L (ref 3.5–5.1)
RBC # BLD AUTO: 4.48 M/UL (ref 3.8–5.2)
SERVICE CMNT-IMP: ABNORMAL
SERVICE CMNT-IMP: ABNORMAL
SODIUM SERPL-SCNC: 135 MMOL/L (ref 136–145)
WBC # BLD AUTO: 4.5 K/UL (ref 3.6–11)

## 2023-03-21 PROCEDURE — 74011250636 HC RX REV CODE- 250/636: Performed by: NURSE ANESTHETIST, CERTIFIED REGISTERED

## 2023-03-21 PROCEDURE — 0MQR0ZZ REPAIR LEFT ANKLE BURSA AND LIGAMENT, OPEN APPROACH: ICD-10-PCS | Performed by: STUDENT IN AN ORGANIZED HEALTH CARE EDUCATION/TRAINING PROGRAM

## 2023-03-21 PROCEDURE — 2709999900 HC NON-CHARGEABLE SUPPLY: Performed by: STUDENT IN AN ORGANIZED HEALTH CARE EDUCATION/TRAINING PROGRAM

## 2023-03-21 PROCEDURE — 74011000250 HC RX REV CODE- 250: Performed by: NURSE ANESTHETIST, CERTIFIED REGISTERED

## 2023-03-21 PROCEDURE — 77030008684 HC TU ET CUF COVD -B: Performed by: ANESTHESIOLOGY

## 2023-03-21 PROCEDURE — 74011250636 HC RX REV CODE- 250/636: Performed by: STUDENT IN AN ORGANIZED HEALTH CARE EDUCATION/TRAINING PROGRAM

## 2023-03-21 PROCEDURE — 74011250637 HC RX REV CODE- 250/637: Performed by: STUDENT IN AN ORGANIZED HEALTH CARE EDUCATION/TRAINING PROGRAM

## 2023-03-21 PROCEDURE — 77030013837 HC NERV BLK KT BBMI -B: Performed by: ANESTHESIOLOGY

## 2023-03-21 PROCEDURE — 73600 X-RAY EXAM OF ANKLE: CPT

## 2023-03-21 PROCEDURE — 77030002933 HC SUT MCRYL J&J -A: Performed by: STUDENT IN AN ORGANIZED HEALTH CARE EDUCATION/TRAINING PROGRAM

## 2023-03-21 PROCEDURE — 77030013079 HC BLNKT BAIR HGGR 3M -A: Performed by: ANESTHESIOLOGY

## 2023-03-21 PROCEDURE — 74011000250 HC RX REV CODE- 250: Performed by: STUDENT IN AN ORGANIZED HEALTH CARE EDUCATION/TRAINING PROGRAM

## 2023-03-21 PROCEDURE — 74011250636 HC RX REV CODE- 250/636: Performed by: ANESTHESIOLOGY

## 2023-03-21 PROCEDURE — 51798 US URINE CAPACITY MEASURE: CPT

## 2023-03-21 PROCEDURE — 77030042509 HC BIT DRL -C: Performed by: STUDENT IN AN ORGANIZED HEALTH CARE EDUCATION/TRAINING PROGRAM

## 2023-03-21 PROCEDURE — 0SSG04Z REPOSITION LEFT ANKLE JOINT WITH INTERNAL FIXATION DEVICE, OPEN APPROACH: ICD-10-PCS | Performed by: STUDENT IN AN ORGANIZED HEALTH CARE EDUCATION/TRAINING PROGRAM

## 2023-03-21 PROCEDURE — 0QSK04Z REPOSITION LEFT FIBULA WITH INTERNAL FIXATION DEVICE, OPEN APPROACH: ICD-10-PCS | Performed by: STUDENT IN AN ORGANIZED HEALTH CARE EDUCATION/TRAINING PROGRAM

## 2023-03-21 PROCEDURE — 77030021678 HC GLIDESCP STAT DISP VERT -B: Performed by: ANESTHESIOLOGY

## 2023-03-21 PROCEDURE — 85027 COMPLETE CBC AUTOMATED: CPT

## 2023-03-21 PROCEDURE — 77030020274 HC MISC IMPL ORTHOPEDIC: Performed by: STUDENT IN AN ORGANIZED HEALTH CARE EDUCATION/TRAINING PROGRAM

## 2023-03-21 PROCEDURE — 76060000037 HC ANESTHESIA 3 TO 3.5 HR: Performed by: STUDENT IN AN ORGANIZED HEALTH CARE EDUCATION/TRAINING PROGRAM

## 2023-03-21 PROCEDURE — C1713 ANCHOR/SCREW BN/BN,TIS/BN: HCPCS | Performed by: STUDENT IN AN ORGANIZED HEALTH CARE EDUCATION/TRAINING PROGRAM

## 2023-03-21 PROCEDURE — 77030000032 HC CUF TRNQT ZIMM -B: Performed by: STUDENT IN AN ORGANIZED HEALTH CARE EDUCATION/TRAINING PROGRAM

## 2023-03-21 PROCEDURE — 76000 FLUOROSCOPY <1 HR PHYS/QHP: CPT

## 2023-03-21 PROCEDURE — 93005 ELECTROCARDIOGRAM TRACING: CPT

## 2023-03-21 PROCEDURE — 74011000258 HC RX REV CODE- 258: Performed by: STUDENT IN AN ORGANIZED HEALTH CARE EDUCATION/TRAINING PROGRAM

## 2023-03-21 PROCEDURE — 80048 BASIC METABOLIC PNL TOTAL CA: CPT

## 2023-03-21 PROCEDURE — 36415 COLL VENOUS BLD VENIPUNCTURE: CPT

## 2023-03-21 PROCEDURE — 65270000029 HC RM PRIVATE

## 2023-03-21 PROCEDURE — 76010000133 HC OR TIME 3 TO 3.5 HR: Performed by: STUDENT IN AN ORGANIZED HEALTH CARE EDUCATION/TRAINING PROGRAM

## 2023-03-21 PROCEDURE — 2709999900 HC NON-CHARGEABLE SUPPLY

## 2023-03-21 PROCEDURE — 81025 URINE PREGNANCY TEST: CPT

## 2023-03-21 PROCEDURE — 76210000017 HC OR PH I REC 1.5 TO 2 HR: Performed by: STUDENT IN AN ORGANIZED HEALTH CARE EDUCATION/TRAINING PROGRAM

## 2023-03-21 PROCEDURE — 77030019908 HC STETH ESOPH SIMS -A: Performed by: ANESTHESIOLOGY

## 2023-03-21 PROCEDURE — 82962 GLUCOSE BLOOD TEST: CPT

## 2023-03-21 PROCEDURE — 77030026438 HC STYL ET INTUB CARD -A: Performed by: ANESTHESIOLOGY

## 2023-03-21 DEVICE — GORILLA, ANKLE FX, ANATOMICAL FIBULAR PLATE L, 11-HOLE
Type: IMPLANTABLE DEVICE | Site: ANKLE | Status: FUNCTIONAL
Brand: GORILLA PLATING SYSTEM

## 2023-03-21 DEVICE — 3.5 X 14 MM R3CON NON-LOCKING PLATE SCREW
Type: IMPLANTABLE DEVICE | Site: ANKLE | Status: FUNCTIONAL
Brand: GORILLA PLATING SYSTEM

## 2023-03-21 DEVICE — 4.2 X 50 MM R3CON NON-LOCKING PLATE SCREW
Type: IMPLANTABLE DEVICE | Site: ANKLE | Status: FUNCTIONAL
Brand: GORILLA PLATING SYSTEM

## 2023-03-21 DEVICE — 3.5 X 10 MM R3CON LOCKING PLATE SCREW
Type: IMPLANTABLE DEVICE | Site: ANKLE | Status: FUNCTIONAL
Brand: GORILLA PLATING SYSTEM

## 2023-03-21 DEVICE — IB KIT, BC, W/ CC FT AND JUMPSTART
Type: IMPLANTABLE DEVICE | Site: ANKLE | Status: FUNCTIONAL
Brand: ARTHREX®

## 2023-03-21 DEVICE — 3.5 X 12 MM R3CON NON-LOCKING PLATE SCREW
Type: IMPLANTABLE DEVICE | Site: ANKLE | Status: FUNCTIONAL
Brand: GORILLA PLATING SYSTEM

## 2023-03-21 DEVICE — DX FIBERTAK SUTURE ANCHOR, #2 MTS W/ NDL
Type: IMPLANTABLE DEVICE | Site: ANKLE | Status: FUNCTIONAL
Brand: ARTHREX®

## 2023-03-21 DEVICE — R3CON NON-LOCKING SCREW, Ø3.50 X 58MM
Type: IMPLANTABLE DEVICE | Site: ANKLE | Status: FUNCTIONAL
Brand: BABY GORILLA®/GORILLA® PLATING SYSTEM

## 2023-03-21 DEVICE — 3.5 X 20 MM R3CON NON-LOCKING PLATE SCREW
Type: IMPLANTABLE DEVICE | Site: ANKLE | Status: FUNCTIONAL
Brand: GORILLA PLATING SYSTEM

## 2023-03-21 RX ORDER — MIDAZOLAM HYDROCHLORIDE 1 MG/ML
INJECTION, SOLUTION INTRAMUSCULAR; INTRAVENOUS AS NEEDED
Status: DISCONTINUED | OUTPATIENT
Start: 2023-03-21 | End: 2023-03-21 | Stop reason: HOSPADM

## 2023-03-21 RX ORDER — IBUPROFEN 200 MG
4 TABLET ORAL AS NEEDED
Status: DISCONTINUED | OUTPATIENT
Start: 2023-03-21 | End: 2023-03-29 | Stop reason: HOSPADM

## 2023-03-21 RX ORDER — HYDROMORPHONE HYDROCHLORIDE 1 MG/ML
.2-.5 INJECTION, SOLUTION INTRAMUSCULAR; INTRAVENOUS; SUBCUTANEOUS
Status: DISCONTINUED | OUTPATIENT
Start: 2023-03-21 | End: 2023-03-21 | Stop reason: HOSPADM

## 2023-03-21 RX ORDER — SODIUM CHLORIDE 0.9 % (FLUSH) 0.9 %
5-40 SYRINGE (ML) INJECTION AS NEEDED
Status: DISCONTINUED | OUTPATIENT
Start: 2023-03-21 | End: 2023-03-29 | Stop reason: HOSPADM

## 2023-03-21 RX ORDER — ONDANSETRON 2 MG/ML
INJECTION INTRAMUSCULAR; INTRAVENOUS AS NEEDED
Status: DISCONTINUED | OUTPATIENT
Start: 2023-03-21 | End: 2023-03-21 | Stop reason: HOSPADM

## 2023-03-21 RX ORDER — NALOXONE HYDROCHLORIDE 0.4 MG/ML
0.4 INJECTION, SOLUTION INTRAMUSCULAR; INTRAVENOUS; SUBCUTANEOUS AS NEEDED
Status: DISCONTINUED | OUTPATIENT
Start: 2023-03-21 | End: 2023-03-29 | Stop reason: HOSPADM

## 2023-03-21 RX ORDER — CETIRIZINE HYDROCHLORIDE 10 MG/1
10 TABLET ORAL
Status: DISCONTINUED | OUTPATIENT
Start: 2023-03-21 | End: 2023-03-29 | Stop reason: HOSPADM

## 2023-03-21 RX ORDER — LIDOCAINE HYDROCHLORIDE 20 MG/ML
INJECTION, SOLUTION EPIDURAL; INFILTRATION; INTRACAUDAL; PERINEURAL AS NEEDED
Status: DISCONTINUED | OUTPATIENT
Start: 2023-03-21 | End: 2023-03-21 | Stop reason: HOSPADM

## 2023-03-21 RX ORDER — ONDANSETRON 2 MG/ML
4 INJECTION INTRAMUSCULAR; INTRAVENOUS
Status: ACTIVE | OUTPATIENT
Start: 2023-03-21 | End: 2023-03-22

## 2023-03-21 RX ORDER — SODIUM CHLORIDE, SODIUM LACTATE, POTASSIUM CHLORIDE, CALCIUM CHLORIDE 600; 310; 30; 20 MG/100ML; MG/100ML; MG/100ML; MG/100ML
25 INJECTION, SOLUTION INTRAVENOUS CONTINUOUS
Status: DISCONTINUED | OUTPATIENT
Start: 2023-03-21 | End: 2023-03-21 | Stop reason: HOSPADM

## 2023-03-21 RX ORDER — GLYCOPYRROLATE 0.2 MG/ML
INJECTION INTRAMUSCULAR; INTRAVENOUS AS NEEDED
Status: DISCONTINUED | OUTPATIENT
Start: 2023-03-21 | End: 2023-03-21 | Stop reason: HOSPADM

## 2023-03-21 RX ORDER — OXYCODONE HYDROCHLORIDE 5 MG/1
10 TABLET ORAL
Status: DISCONTINUED | OUTPATIENT
Start: 2023-03-21 | End: 2023-03-29 | Stop reason: HOSPADM

## 2023-03-21 RX ORDER — MESALAMINE 400 MG/1
1600 CAPSULE, DELAYED RELEASE ORAL DAILY
Status: DISCONTINUED | OUTPATIENT
Start: 2023-03-22 | End: 2023-03-29 | Stop reason: HOSPADM

## 2023-03-21 RX ORDER — MIDAZOLAM HYDROCHLORIDE 1 MG/ML
1 INJECTION, SOLUTION INTRAMUSCULAR; INTRAVENOUS AS NEEDED
Status: DISCONTINUED | OUTPATIENT
Start: 2023-03-21 | End: 2023-03-21 | Stop reason: HOSPADM

## 2023-03-21 RX ORDER — PROPOFOL 10 MG/ML
INJECTION, EMULSION INTRAVENOUS AS NEEDED
Status: DISCONTINUED | OUTPATIENT
Start: 2023-03-21 | End: 2023-03-21 | Stop reason: HOSPADM

## 2023-03-21 RX ORDER — FENTANYL CITRATE 50 UG/ML
50 INJECTION, SOLUTION INTRAMUSCULAR; INTRAVENOUS AS NEEDED
Status: DISCONTINUED | OUTPATIENT
Start: 2023-03-21 | End: 2023-03-21 | Stop reason: HOSPADM

## 2023-03-21 RX ORDER — DEXAMETHASONE SODIUM PHOSPHATE 4 MG/ML
INJECTION, SOLUTION INTRA-ARTICULAR; INTRALESIONAL; INTRAMUSCULAR; INTRAVENOUS; SOFT TISSUE AS NEEDED
Status: DISCONTINUED | OUTPATIENT
Start: 2023-03-21 | End: 2023-03-21 | Stop reason: HOSPADM

## 2023-03-21 RX ORDER — ROPIVACAINE HYDROCHLORIDE 5 MG/ML
INJECTION, SOLUTION EPIDURAL; INFILTRATION; PERINEURAL AS NEEDED
Status: DISCONTINUED | OUTPATIENT
Start: 2023-03-21 | End: 2023-03-21 | Stop reason: HOSPADM

## 2023-03-21 RX ORDER — FENTANYL CITRATE 50 UG/ML
25 INJECTION, SOLUTION INTRAMUSCULAR; INTRAVENOUS
Status: DISCONTINUED | OUTPATIENT
Start: 2023-03-21 | End: 2023-03-21 | Stop reason: HOSPADM

## 2023-03-21 RX ORDER — SODIUM CHLORIDE 0.9 % (FLUSH) 0.9 %
5-40 SYRINGE (ML) INJECTION EVERY 8 HOURS
Status: DISCONTINUED | OUTPATIENT
Start: 2023-03-21 | End: 2023-03-29 | Stop reason: HOSPADM

## 2023-03-21 RX ORDER — DEXTROSE MONOHYDRATE 100 MG/ML
0-250 INJECTION, SOLUTION INTRAVENOUS AS NEEDED
Status: DISCONTINUED | OUTPATIENT
Start: 2023-03-21 | End: 2023-03-29 | Stop reason: HOSPADM

## 2023-03-21 RX ORDER — AZATHIOPRINE 50 MG/1
100 TABLET ORAL
Status: DISCONTINUED | OUTPATIENT
Start: 2023-03-22 | End: 2023-03-29 | Stop reason: HOSPADM

## 2023-03-21 RX ORDER — EPHEDRINE SULFATE/0.9% NACL/PF 50 MG/5 ML
SYRINGE (ML) INTRAVENOUS AS NEEDED
Status: DISCONTINUED | OUTPATIENT
Start: 2023-03-21 | End: 2023-03-21 | Stop reason: HOSPADM

## 2023-03-21 RX ORDER — ONDANSETRON 2 MG/ML
4 INJECTION INTRAMUSCULAR; INTRAVENOUS AS NEEDED
Status: DISCONTINUED | OUTPATIENT
Start: 2023-03-21 | End: 2023-03-21 | Stop reason: HOSPADM

## 2023-03-21 RX ORDER — SODIUM CHLORIDE 9 MG/ML
125 INJECTION, SOLUTION INTRAVENOUS CONTINUOUS
Status: DISPENSED | OUTPATIENT
Start: 2023-03-21 | End: 2023-03-22

## 2023-03-21 RX ORDER — INSULIN LISPRO 100 [IU]/ML
INJECTION, SOLUTION INTRAVENOUS; SUBCUTANEOUS
Status: DISCONTINUED | OUTPATIENT
Start: 2023-03-22 | End: 2023-03-22

## 2023-03-21 RX ORDER — HYDROMORPHONE HYDROCHLORIDE 1 MG/ML
INJECTION, SOLUTION INTRAMUSCULAR; INTRAVENOUS; SUBCUTANEOUS AS NEEDED
Status: DISCONTINUED | OUTPATIENT
Start: 2023-03-21 | End: 2023-03-21 | Stop reason: HOSPADM

## 2023-03-21 RX ORDER — INSULIN GLARGINE 100 [IU]/ML
20 INJECTION, SOLUTION SUBCUTANEOUS
Status: DISCONTINUED | OUTPATIENT
Start: 2023-03-22 | End: 2023-03-23

## 2023-03-21 RX ORDER — ENOXAPARIN SODIUM 100 MG/ML
40 INJECTION SUBCUTANEOUS DAILY
Status: DISCONTINUED | OUTPATIENT
Start: 2023-03-22 | End: 2023-03-21 | Stop reason: DRUGHIGH

## 2023-03-21 RX ORDER — POLYETHYLENE GLYCOL 3350 17 G/17G
17 POWDER, FOR SOLUTION ORAL DAILY
Status: DISCONTINUED | OUTPATIENT
Start: 2023-03-22 | End: 2023-03-22

## 2023-03-21 RX ORDER — OXYCODONE HYDROCHLORIDE 5 MG/1
5 TABLET ORAL
Status: DISCONTINUED | OUTPATIENT
Start: 2023-03-21 | End: 2023-03-29 | Stop reason: HOSPADM

## 2023-03-21 RX ORDER — FACIAL-BODY WIPES
10 EACH TOPICAL DAILY PRN
Status: DISCONTINUED | OUTPATIENT
Start: 2023-03-23 | End: 2023-03-29 | Stop reason: HOSPADM

## 2023-03-21 RX ORDER — FENTANYL CITRATE 50 UG/ML
INJECTION, SOLUTION INTRAMUSCULAR; INTRAVENOUS AS NEEDED
Status: DISCONTINUED | OUTPATIENT
Start: 2023-03-21 | End: 2023-03-21 | Stop reason: HOSPADM

## 2023-03-21 RX ORDER — LIDOCAINE HYDROCHLORIDE 10 MG/ML
0.1 INJECTION, SOLUTION EPIDURAL; INFILTRATION; INTRACAUDAL; PERINEURAL AS NEEDED
Status: DISCONTINUED | OUTPATIENT
Start: 2023-03-21 | End: 2023-03-21 | Stop reason: HOSPADM

## 2023-03-21 RX ORDER — ONDANSETRON 4 MG/1
4 TABLET, ORALLY DISINTEGRATING ORAL
Status: DISCONTINUED | OUTPATIENT
Start: 2023-03-21 | End: 2023-03-29 | Stop reason: HOSPADM

## 2023-03-21 RX ORDER — METFORMIN HYDROCHLORIDE EXTENDED-RELEASE TABLETS 500 MG/1
1000 TABLET, FILM COATED, EXTENDED RELEASE ORAL 2 TIMES DAILY
Status: DISCONTINUED | OUTPATIENT
Start: 2023-03-22 | End: 2023-03-29 | Stop reason: HOSPADM

## 2023-03-21 RX ORDER — THERA TABS 400 MCG
1 TAB ORAL DAILY
Status: DISCONTINUED | OUTPATIENT
Start: 2023-03-22 | End: 2023-03-29 | Stop reason: HOSPADM

## 2023-03-21 RX ORDER — AMOXICILLIN 250 MG
1 CAPSULE ORAL 2 TIMES DAILY
Status: DISCONTINUED | OUTPATIENT
Start: 2023-03-21 | End: 2023-03-22

## 2023-03-21 RX ORDER — PANTOPRAZOLE SODIUM 40 MG/1
40 TABLET, DELAYED RELEASE ORAL DAILY
Status: DISCONTINUED | OUTPATIENT
Start: 2023-03-22 | End: 2023-03-29 | Stop reason: HOSPADM

## 2023-03-21 RX ORDER — NEOSTIGMINE METHYLSULFATE 1 MG/ML
INJECTION, SOLUTION INTRAVENOUS AS NEEDED
Status: DISCONTINUED | OUTPATIENT
Start: 2023-03-21 | End: 2023-03-21 | Stop reason: HOSPADM

## 2023-03-21 RX ORDER — SUCCINYLCHOLINE CHLORIDE 20 MG/ML
INJECTION INTRAMUSCULAR; INTRAVENOUS AS NEEDED
Status: DISCONTINUED | OUTPATIENT
Start: 2023-03-21 | End: 2023-03-21 | Stop reason: HOSPADM

## 2023-03-21 RX ORDER — FERROUS SULFATE, DRIED 160(50) MG
1 TABLET, EXTENDED RELEASE ORAL
Status: DISCONTINUED | OUTPATIENT
Start: 2023-03-22 | End: 2023-03-29 | Stop reason: HOSPADM

## 2023-03-21 RX ORDER — ROCURONIUM BROMIDE 10 MG/ML
INJECTION, SOLUTION INTRAVENOUS AS NEEDED
Status: DISCONTINUED | OUTPATIENT
Start: 2023-03-21 | End: 2023-03-21 | Stop reason: HOSPADM

## 2023-03-21 RX ORDER — ENOXAPARIN SODIUM 100 MG/ML
30 INJECTION SUBCUTANEOUS EVERY 12 HOURS
Status: DISCONTINUED | OUTPATIENT
Start: 2023-03-21 | End: 2023-03-29 | Stop reason: HOSPADM

## 2023-03-21 RX ORDER — HYDROMORPHONE HYDROCHLORIDE 1 MG/ML
0.5 INJECTION, SOLUTION INTRAMUSCULAR; INTRAVENOUS; SUBCUTANEOUS
Status: DISPENSED | OUTPATIENT
Start: 2023-03-21 | End: 2023-03-22

## 2023-03-21 RX ORDER — PHENYLEPHRINE HCL IN 0.9% NACL 0.4MG/10ML
SYRINGE (ML) INTRAVENOUS AS NEEDED
Status: DISCONTINUED | OUTPATIENT
Start: 2023-03-21 | End: 2023-03-21 | Stop reason: HOSPADM

## 2023-03-21 RX ADMIN — ROCURONIUM BROMIDE 30 MG: 10 INJECTION INTRAVENOUS at 14:39

## 2023-03-21 RX ADMIN — SODIUM CHLORIDE 20 MCG/MIN: 900 INJECTION, SOLUTION INTRAVENOUS at 15:21

## 2023-03-21 RX ADMIN — CEFAZOLIN 3 G: 10 INJECTION, POWDER, FOR SOLUTION INTRAVENOUS at 22:36

## 2023-03-21 RX ADMIN — FENTANYL CITRATE 50 MCG: 50 INJECTION INTRAMUSCULAR; INTRAVENOUS at 14:02

## 2023-03-21 RX ADMIN — SODIUM CHLORIDE, POTASSIUM CHLORIDE, SODIUM LACTATE AND CALCIUM CHLORIDE: 600; 310; 30; 20 INJECTION, SOLUTION INTRAVENOUS at 17:38

## 2023-03-21 RX ADMIN — GLYCOPYRROLATE 0.6 MG: 0.2 INJECTION, SOLUTION INTRAMUSCULAR; INTRAVENOUS at 17:10

## 2023-03-21 RX ADMIN — ROCURONIUM BROMIDE 30 MG: 10 INJECTION INTRAVENOUS at 16:36

## 2023-03-21 RX ADMIN — Medication 80 MCG: at 15:16

## 2023-03-21 RX ADMIN — LIDOCAINE HYDROCHLORIDE 50 MG: 20 INJECTION, SOLUTION EPIDURAL; INFILTRATION; INTRACAUDAL; PERINEURAL at 14:32

## 2023-03-21 RX ADMIN — Medication 120 MCG: at 15:01

## 2023-03-21 RX ADMIN — HYDROMORPHONE HYDROCHLORIDE 0.1 MG: 1 INJECTION, SOLUTION INTRAMUSCULAR; INTRAVENOUS; SUBCUTANEOUS at 16:04

## 2023-03-21 RX ADMIN — PROPOFOL 20 MCG/KG/MIN: 10 INJECTION, EMULSION INTRAVENOUS at 15:42

## 2023-03-21 RX ADMIN — MIDAZOLAM 1 MG: 1 INJECTION INTRAMUSCULAR; INTRAVENOUS at 14:27

## 2023-03-21 RX ADMIN — HYDROMORPHONE HYDROCHLORIDE 0.4 MG: 1 INJECTION, SOLUTION INTRAMUSCULAR; INTRAVENOUS; SUBCUTANEOUS at 16:00

## 2023-03-21 RX ADMIN — PROPOFOL 150 MG: 10 INJECTION, EMULSION INTRAVENOUS at 14:32

## 2023-03-21 RX ADMIN — Medication 1 AMPULE: at 21:50

## 2023-03-21 RX ADMIN — FENTANYL CITRATE 100 MCG: 50 INJECTION INTRAMUSCULAR; INTRAVENOUS at 14:27

## 2023-03-21 RX ADMIN — PROPOFOL 50 MG: 10 INJECTION, EMULSION INTRAVENOUS at 16:33

## 2023-03-21 RX ADMIN — SODIUM CHLORIDE 125 ML/HR: 9 INJECTION, SOLUTION INTRAVENOUS at 20:10

## 2023-03-21 RX ADMIN — DEXAMETHASONE SODIUM PHOSPHATE 8 MG: 4 INJECTION, SOLUTION INTRAMUSCULAR; INTRAVENOUS at 15:06

## 2023-03-21 RX ADMIN — MIDAZOLAM 2 MG: 1 INJECTION INTRAMUSCULAR; INTRAVENOUS at 14:00

## 2023-03-21 RX ADMIN — SODIUM CHLORIDE, POTASSIUM CHLORIDE, SODIUM LACTATE AND CALCIUM CHLORIDE 25 ML/HR: 600; 310; 30; 20 INJECTION, SOLUTION INTRAVENOUS at 10:42

## 2023-03-21 RX ADMIN — MIDAZOLAM 2 MG: 1 INJECTION INTRAMUSCULAR; INTRAVENOUS at 14:02

## 2023-03-21 RX ADMIN — Medication 4 MG: at 17:10

## 2023-03-21 RX ADMIN — HYDROMORPHONE HYDROCHLORIDE 0.2 MG: 1 INJECTION, SOLUTION INTRAMUSCULAR; INTRAVENOUS; SUBCUTANEOUS at 16:02

## 2023-03-21 RX ADMIN — ROPIVACAINE HYDROCHLORIDE 20 ML: 5 INJECTION, SOLUTION EPIDURAL; INFILTRATION; PERINEURAL at 14:13

## 2023-03-21 RX ADMIN — Medication 120 MCG: at 14:53

## 2023-03-21 RX ADMIN — GLYCOPYRROLATE 0.2 MG: 0.2 INJECTION, SOLUTION INTRAMUSCULAR; INTRAVENOUS at 17:18

## 2023-03-21 RX ADMIN — Medication 80 MCG: at 14:42

## 2023-03-21 RX ADMIN — ONDANSETRON HYDROCHLORIDE 4 MG: 2 INJECTION, SOLUTION INTRAMUSCULAR; INTRAVENOUS at 17:10

## 2023-03-21 RX ADMIN — Medication 3 AMPULE: at 10:43

## 2023-03-21 RX ADMIN — HYDROMORPHONE HYDROCHLORIDE 0.5 MG: 1 INJECTION, SOLUTION INTRAMUSCULAR; INTRAVENOUS; SUBCUTANEOUS at 16:36

## 2023-03-21 RX ADMIN — SODIUM CHLORIDE, PRESERVATIVE FREE 10 ML: 5 INJECTION INTRAVENOUS at 22:49

## 2023-03-21 RX ADMIN — ROPIVACAINE HYDROCHLORIDE 10 ML: 5 INJECTION, SOLUTION EPIDURAL; INFILTRATION; PERINEURAL at 14:21

## 2023-03-21 RX ADMIN — HYDROMORPHONE HYDROCHLORIDE 0.4 MG: 1 INJECTION, SOLUTION INTRAMUSCULAR; INTRAVENOUS; SUBCUTANEOUS at 15:55

## 2023-03-21 RX ADMIN — FENTANYL CITRATE 100 MCG: 50 INJECTION INTRAMUSCULAR; INTRAVENOUS at 14:00

## 2023-03-21 RX ADMIN — Medication 3 G: at 14:39

## 2023-03-21 RX ADMIN — PROPOFOL 50 MG: 10 INJECTION, EMULSION INTRAVENOUS at 15:06

## 2023-03-21 RX ADMIN — SODIUM CHLORIDE, POTASSIUM CHLORIDE, SODIUM LACTATE AND CALCIUM CHLORIDE: 600; 310; 30; 20 INJECTION, SOLUTION INTRAVENOUS at 14:27

## 2023-03-21 RX ADMIN — Medication 10 MG: at 15:03

## 2023-03-21 RX ADMIN — SUCCINYLCHOLINE CHLORIDE 160 MG: 20 INJECTION, SOLUTION INTRAMUSCULAR; INTRAVENOUS at 14:32

## 2023-03-21 RX ADMIN — SODIUM CHLORIDE, PRESERVATIVE FREE 10 ML: 5 INJECTION INTRAVENOUS at 21:51

## 2023-03-21 RX ADMIN — PROPOFOL 50 MG: 10 INJECTION, EMULSION INTRAVENOUS at 15:59

## 2023-03-21 RX ADMIN — ROCURONIUM BROMIDE 20 MG: 10 INJECTION INTRAVENOUS at 15:53

## 2023-03-21 RX ADMIN — ENOXAPARIN SODIUM 30 MG: 100 INJECTION SUBCUTANEOUS at 21:50

## 2023-03-21 NOTE — BRIEF OP NOTE
Brief Postoperative Note    Patient: Brian Barnes  YOB: 1975  MRN: [id number]    Date of Procedure: 3/21/2023     Pre-Op Diagnosis:   LEFT LATERAL MALLEOLUS FRACTURE  UNCONTROLLED TYPE II DIABETES WITH NEUROPATHY    Post-Op Diagnosis:   LEFT LATERAL MALLEOLUS FRACTURE  LATERAL LIGAMENT INSTABILITY  UNCONTROLLED TYPE II DIABETES WITH NEUROPATHY    Procedure(s):  OPEN REDUCTION INTERNAL FIXATION LEFT LATERAL MALLEOLUS,   OPEN REDUCTION INTERNAL FIXATION SYNDESMOSIS  LATERAL LIGAMENT RECONSTRUCTION    Surgeon(s):  Harman Ortega MD    Surgical Assistant: Surg Asst-1: Figueroa Burns    Anesthesia: General     Estimated Blood Loss (mL): less than 491     Complications: None    Specimens: * No specimens in log *     Implants:   Implant Name Type Inv. Item Serial No.  Lot No. LRB No. Used Action   SCREW BNE L20MM DIA3.5MM NONLOCKING FOR R3CON PLATING SYS - SN/A  SCREW BNE L20MM DIA3.5MM NONLOCKING FOR R3CON PLATING SYS N/A PARAGON 28_WD N/A Left 1 Implanted   11 hole left plate   N/A PARAGON 11_DR N/A Left 1 Implanted   SCREW BNE L12MM DIA3. 5MM R3CON BURAK PLT [place] PLATING SYS - SN/A  SCREW BNE L12MM DIA3. 5MM R3CON BURAK PLT [place] PLATING SYS N/A PARAGON 28_WD N/A Left 1 Implanted   SCREW BNE L14MM DIA3.5MM NONLOCKING FOR R3CON PLATING SYS - SN/A  SCREW BNE L14MM DIA3.5MM NONLOCKING FOR R3CON PLATING SYS N/A PARAGON 28_WD N/A Left 3 Implanted   SCREW BNE L10MM DIA3. 5MM BURAK FOR R3CON PLATING SYS GORILLA - SN/A  SCREW BNE L10MM DIA3. 5MM BURAK FOR R3CON PLATING SYS [place]ILLA N/A PARAGON 28_WD N/A Left 1 Implanted   3.5 x 52mm non-locking screw   N/A PARAGON 28_WD N/A Left 1 Implanted   SCREW BNE L50MM [id number] NONLOCKING [place] PLATING SYS R3CON - SN/A  SCREW BNE L50MM [id number] NONLOCKING [place] PLATING SYS R3CON N/A PARAGON 28_WD N/A Left 1 Implanted   3.5 x 58mm non-locking screw   N/A PARAGON 28_WD N/A Left 1 Implanted   DX FIBERTAK SUTURE ANCHOR #2 MTS WITH NDL - [id number]  DX [address] #2 MTS WITH NDL [id number] [employer] [id number] Left 1 Implanted   DX FIBERTAK SUTURE ANCHOR #2 MTS WITH NDL - [id number]  DX FIBERTAK SUTURE ANCHOR #2 MTS WITH NDL [id number] [address] [employer] [id number] Left 1 Implanted   INTERNAL BRACE IMPLANT SYSTEM LIGAMENT AUGMENTATION REPAIR, BIOCOMPOSITE with [employer]ESSING   [id number] ARTHREX [id number] Left 1 Implanted       Drains: * No LDAs found *    Findings: lateral ankle instability, gross instability with anterior drawer and varus stress testing requiring lateral ligament reconstruction with internal brace    Electronically Signed by Osei Rosen MD on 3/21/2023 at 5:53 PM

## 2023-03-21 NOTE — ANESTHESIA PROCEDURE NOTES
Peripheral Block    Start time: 3/21/2023 1:59 PM  End time: 3/21/2023 2:15 PM  Performed by: Leonard Smith MD  Authorized by: Leonard Smith MD       Pre-procedure: Indications: at surgeon's request and post-op pain management    Preanesthetic Checklist: patient identified, risks and benefits discussed, site marked, timeout performed, anesthesia consent given and patient being monitored      Block Type:   Block Type:  Popliteal  Laterality:  Left  Monitoring:  Standard ASA monitoring, continuous pulse ox, frequent vital sign checks, heart rate, responsive to questions and oxygen  Injection Technique:  Single shot  Procedures: ultrasound guided and nerve stimulator    Patient Position: supine  Prep: DuraPrep    : 10 cm above the popliteal fossa & tibial tuberosity. Needle Type:  Stimuplex  Needle Gauge:  22 G  Needle Localization:  Ultrasound guidance and nerve stimulator  Motor Response comment:   Motor Response: minimal motor response >0.4 mA    Med Admin Time: 3/21/2023 2:23 PM    Assessment:  Number of attempts:  1  Injection Assessment:  Incremental injection every 5 mL, local visualized surrounding nerve on ultrasound, negative aspiration for blood, no intravascular symptoms, no paresthesia and ultrasound image on chart  Patient tolerance:  Patient tolerated the procedure well with no immediate complications  Under ultrasound guidance, a 22 gauge needle was inserted and placed in close proximity to the nerve. Ultrasound was also used to visualize the spread of the anesthetic in close proximity to the nerve being blocked. The nerve appeared anatomicaly normal.  A permanent ultrasound image was saved in the patient's record.

## 2023-03-21 NOTE — H&P
History of present illness: Ms. Shivani Gilmore presents today for evaluation of her left ankle. Pain rating = 4 out of 10. She sustained an injury on 03/09/2023. She slipped and fell her left leg got caught underneath her and she twisted her ankle. She had increased pain following this. She went to urgent care where x-rays were obtained demonstrating a ankle fracture. She was placed into a boot. She has been weight-bearing on this in the boot. She has also been using an Ace wrap. Of note, she does have a history of uncontrolled type 2 diabetes. She was recently admitted for diabetic ketoacidosis and pneumonia. She was recently started on insulin for her diabetes. Her last hemoglobin A1c was 9. 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 She lives in a second-story apartment without an elevator.      Past Medical History:   Diagnosis Date   Diabetes mellitus   GERD (gastroesophageal reflux disease)   Hypertension   Inflammatory bowel disease     Current Outpatient Medications   Medication Instructions   cetirizine (ZyrTEC) 10 MG tablet Oral   insulin glargine (LANTUS) 100 UNIT/ML injection Subcutaneous   lisinopril (PRINIVIL,ZESTRIL) 10 MG tablet 1 tablet, Oral, Daily   mesalamine (APRISO) 1.5 g, Oral, Daily   metFORMIN XR (GLUCOPHATE-XR) 500 MG 24 hr tablet 2 tablets, Oral, 2 times daily with meals   pantoprazole (PROTONIX) 40 mg, Oral, Daily     Past Surgical History:   Procedure Laterality Date   NO RELEVANT SURGERIES     Social History     Tobacco Use   Smoking status: Never   Smokeless tobacco: Never   Substance Use Topics   Alcohol use: Never   Drug use: Never     Family History   Problem Relation Age of Onset   No Known Problems Mother   Diabetes Father   Diabetes Brother   Diabetes Brother   No Known Problems Sister   No Known Problems Son   No Known Problems Daughter   No Known Problems Other   Clotting disorder Neg Hx   Anesthesia problems Neg Hx   Coronary artery disease Neg Hx OBJECTIVE  Constitutional: No acute distress. Her body mass index is 36.39 kg/m². Eyes: Sclera are nonicteric. Respiratory: No labored breathing. Cardiovascular: No marked edema. Skin: No marked skin ulcers. Neurological: See below  Psychiatric: Alert and oriented x3. Musculoskeletal     Left lower extremity: 1+ DP pulses. Toes are warm and well perfused. Skin is intact without erythema or wounds. Sensation is decreased to light touch throughout her foot. She also has decreased sensation to 5.07 monofilament testing. She does have ecchymosis about the ankle. Mild swelling. Tender to palpation over the lateral malleolus. No tenderness elsewhere around her ankle. No tenderness more proximally along the fibular shaft or head/neck region. No tenderness in the foot including the Lisfranc region. IMAGING / STUDIES  Order: XR ANKLE 3+ VW LEFT - Indication: Acute left ankle pain  Order: XR FOOT 3+ VW LEFT - Indication: Acute left ankle pain    X-ray ankle left 3+ views ([id number])    Result Date: 3/17/2023  Standing. Lat, AP, Oblique. Impression: Displaced Ordonez B distal fibula fracture. There is posterior angulation also noted on the lateral.    X-ray foot left 3+ views ([id number])    Result Date: 3/17/2023  Standing. AP, Oblique, Lat. Impression: No additional fractures are noted. She does have a fairly significant pes Rocky Face valgus alignment. 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 PROCEDURES  Procedures    ASSESSMENT  1. Closed displaced fracture of lateral malleolus of left fibula, initial encounter   2. Acute left ankle pain   3. Type 2 diabetes mellitus with diabetic polyneuropathy, with long-term current use of insulin   4. Uncontrolled type 2 diabetes mellitus with ketoacidosis   5. Class 2 severe obesity with serious comorbidity and body mass index (BMI) of 36.0 to 36.9 in adult, unspecified obesity type     There is no problem list on file for this patient.     PLAN  Treatment Plan: 41-year-old female with a displaced Ordonez B distal fibula fracture. This is in the setting of uncontrolled type 2 diabetes. Prior to her visit with me today, she denied any history of neuropathy. However, on exam, she clearly does have some neuropathy including loss of protective sensation with 5.07 monofilament testing. She was also recently admitted for pneumonia and diabetic ketoacidosis. I reviewed the clinical and radiographic findings with her. I think given her uncontrolled type 2 diabetes with neuropathy, this is something that we should treat with surgery to stabilize the ankle. I discussed risks with the patient including anesthetic complications, infection, bleeding, blood clot, nerve injury, numbness, stiffness, scar tissue, pain, nonhealing of the wound, nonunion, malunion, hardware complications, or need for further surgery. We discussed that she is at a higher risk of postoperative complications given her medical comorbidities particularly higher risk of wound healing, infection, malunion, nonunion, delayed bone healing. We also discussed that in this setting, sometimes these injuries do go on to develop Charcot arthropathy of the ankle. Therefore, I think that it is prudent that we fix this surgically to try to stabilize the ankle and hopefully prevent any residual deformity from occurring. She is in understanding of this. She would like to go and proceed with surgery. Additionally, she does not think that she will be able to remain nonweightbearing postoperatively. She lives in a second-story apartment without an elevator. She does not have any help at home. We will plan for surgery on Tuesday. We will plan for a postop admission given her inability to remain nonweightbearing and lack of support at home. She may end up requiring a stay at rehab.  Due to her medical comorbidities including the uncontrolled type 2 diabetes with neuropathy, we will have to prolonged her period of nonweightbearing to likely close to 10 weeks.

## 2023-03-21 NOTE — ANESTHESIA PROCEDURE NOTES
Peripheral Block    Start time: 3/21/2023 2:17 PM  End time: 3/21/2023 2:22 PM  Performed by: Jory Ferris MD  Authorized by: Jory Ferris MD       Pre-procedure: Indications: at surgeon's request and post-op pain management    Preanesthetic Checklist: patient identified, risks and benefits discussed, site marked, timeout performed, anesthesia consent given and patient being monitored      Block Type:   Block Type: Adductor canal block  Laterality:  Left  Monitoring:  Standard ASA monitoring, continuous pulse ox, frequent vital sign checks, heart rate, responsive to questions and oxygen  Injection Technique:  Single shot  Procedures: ultrasound guided    Patient Position: supine  Prep: DuraPrep    Location:  Lower thigh  Needle Type:  Stimuplex  Needle Gauge:  22 G  Needle Localization:  Ultrasound guidance  Motor Response comment:   Motor Response: minimal motor response >0.4 mA    Med Admin Time: 3/21/2023 2:24 PM    Assessment:  Number of attempts:  1  Injection Assessment:  Incremental injection every 5 mL, local visualized surrounding nerve on ultrasound, negative aspiration for blood, no intravascular symptoms, no paresthesia and ultrasound image on chart  Patient tolerance:  Patient tolerated the procedure well with no immediate complications  Under ultrasound guidance, a 22 gauge needle was inserted and placed in close proximity to the nerve. Ultrasound was also used to visualize the spread of the anesthetic in close proximity to the nerve being blocked. The nerve appeared anatomicaly normal.  A permanent ultrasound image was saved in the patient's record.

## 2023-03-21 NOTE — ANESTHESIA PREPROCEDURE EVALUATION
Anesthetic History     PONV (denies PONV, has NEVER had surgery)     Comments: No fam hx of probs with anesthesia     Review of Systems / Medical History  Patient summary reviewed, nursing notes reviewed and pertinent labs reviewed    Pulmonary  Within defined limits                 Neuro/Psych   Within defined limits           Cardiovascular    Hypertension              Exercise tolerance: >4 METS  Comments: 6-28-16 EKG: ST, LAE   GI/Hepatic/Renal     GERD          Comments: Fe defic Anemia, hgb 7.4; diarrhea  Chronic diarrhea  Heme positive stool  Nausea and vomiting  CT scan shows possible colitis and fatty liver Endo/Other    Diabetes: type 2  Hypothyroidism  Obesity and anemia    Comments: Multinodular goiter Other Findings   Comments: Hyponatremia 132 today  Malnutrition           Physical Exam    Airway  Mallampati: I  TM Distance: 4 - 6 cm  Neck ROM: normal range of motion   Mouth opening: Normal    Comments: High arched palate Cardiovascular    Rhythm: regular  Rate: normal         Dental  No notable dental hx       Pulmonary  Breath sounds clear to auscultation               Abdominal  GI exam deferred       Other Findings            Anesthetic Plan    ASA: 2  Anesthesia type: general    Monitoring Plan: BIS  Post-op pain plan if not by surgeon: peripheral nerve block single    Induction: Intravenous  Anesthetic plan and risks discussed with: Patient

## 2023-03-21 NOTE — PERIOP NOTES
Franco Blaine 86 from Operating Room to PACU    Report received from [facility] and Varsha Marcano CRNA regarding Bravo Santiago. Surgeon(s):  Holland Garcia MD  And Procedure(s) (LRB):  OPEN REDUCTION INTERNAL FIXATION LEFT LATERAL MALLEOLUS, OPEN REDUCTION INTERNAL FIXATION SYNDESMOSIS AND LATERAL LIGAMENT RECONSTRUCTION(GENERAL WITH POPLITEAL AND ADDUCTOR BLOCK) (Left)  confirmed   with allergies and dressings discussed. Anesthesia type, drugs, patient history, complications, estimated blood loss, vital signs, intake and output, and last pain medication, lines, reversal medications, and temperature were reviewed. [address] REPORT:    Verbal report given to [place] RN(name) on Bravo Santiago  being transferred to Ortho(unit) for routine post - op       Report consisted of patients Situation, Background, Assessment and   Recommendations(SBAR). Information from the following report(s) SBAR, Kardex, OR Summary, Procedure Summary, Intake/Output, and MAR was reviewed with the receiving nurse. Opportunity for questions and clarification was provided. Patient transported with:   O2 @ 2 liters  Registered Nurse  Patient chart  Patient belongings    64 Patient's sister, Norman Nixon, updated via phone and made aware of patients room number 108.

## 2023-03-21 NOTE — ANESTHESIA POSTPROCEDURE EVALUATION
Procedure(s):  OPEN REDUCTION INTERNAL FIXATION LEFT LATERAL MALLEOLUS, OPEN REDUCTION INTERNAL FIXATION SYNDESMOSIS AND LATERAL LIGAMENT RECONSTRUCTION(GENERAL WITH POPLITEAL AND ADDUCTOR BLOCK). general    Anesthesia Post Evaluation        Patient location during evaluation: PACU  Note status: Adequate. Level of consciousness: responsive to verbal stimuli and sleepy but conscious  Pain management: satisfactory to patient  Airway patency: patent  Anesthetic complications: no  Cardiovascular status: acceptable  Respiratory status: acceptable  Hydration status: acceptable  Comments: +Post-Anesthesia Evaluation and Assessment    Patient: Vessie Lombard MRN: [id number]  SSN: [id number]   YOB: 1975  Age: 49 y.o. Sex: female      Cardiovascular Function/Vital Signs    BP (!) 108/48   Pulse 74   Temp 36.9 °C (98.5 °F)   Resp 18   Ht 5' 9\" (1.753 m)   Wt 126.2 kg (278 lb 3.5 oz)   SpO2 94%   BMI 41.09 kg/m²     Patient is status post Procedure(s):  OPEN REDUCTION INTERNAL FIXATION LEFT LATERAL MALLEOLUS, OPEN REDUCTION INTERNAL FIXATION SYNDESMOSIS AND LATERAL LIGAMENT RECONSTRUCTION(GENERAL WITH POPLITEAL AND ADDUCTOR BLOCK). Nausea/Vomiting: Controlled. Postoperative hydration reviewed and adequate. Pain:  Pain Scale 1: Numeric (0 - 10) (03/21/23 1800)  Pain Intensity 1: 0 (03/21/23 1800)   Managed. Neurological Status:   Neuro (WDL): Exceptions to WDL (03/21/23 1748)   At baseline. Mental Status and Level of Consciousness: Arousable. Pulmonary Status:   O2 Device: Nasal cannula (03/21/23 1805)   Adequate oxygenation and airway patent. Complications related to anesthesia: None    Post-anesthesia assessment completed. No concerns.     Signed By: Elvin Tolentino MD    3/21/2023  Post anesthesia nausea and vomiting:  controlled      INITIAL Post-op Vital signs:   Vitals Value Taken Time   /49 03/21/23 1815   Temp 36.9 °C (98.5 °F) 03/21/23 1748   Pulse 61 03/21/23 1826   Resp 15 03/21/23 1826   SpO2 95 % 03/21/23 1826   Vitals shown include unvalidated device data.

## 2023-03-21 NOTE — PERIOP NOTES
1359 - TIMEOUT DONE - PLAN TO BLOCK LEFT LEG X2. BOTH SITES MARKED \"BLOCK\" PRIOR TO SEDATION. FIRST BLOCK PLACED. 1417 - SECOND TIMEOUT DONE - SECOND LEFT LEG BLOCK PLACED BY DR. BYRD. SAHIL NORWOOD.

## 2023-03-22 LAB
ANION GAP SERPL CALC-SCNC: 5 MMOL/L (ref 5–15)
BUN SERPL-MCNC: 13 MG/DL (ref 6–20)
BUN/CREAT SERPL: 22 (ref 12–20)
CALCIUM SERPL-MCNC: 8.9 MG/DL (ref 8.5–10.1)
CHLORIDE SERPL-SCNC: 104 MMOL/L (ref 97–108)
CO2 SERPL-SCNC: 27 MMOL/L (ref 21–32)
CREAT SERPL-MCNC: 0.58 MG/DL (ref 0.55–1.02)
EST. AVERAGE GLUCOSE BLD GHB EST-MCNC: 217 MG/DL
GLUCOSE BLD STRIP.AUTO-MCNC: 198 MG/DL (ref 65–117)
GLUCOSE BLD STRIP.AUTO-MCNC: 239 MG/DL (ref 65–117)
GLUCOSE BLD STRIP.AUTO-MCNC: 280 MG/DL (ref 65–117)
GLUCOSE SERPL-MCNC: 237 MG/DL (ref 65–100)
HBA1C MFR BLD: 9.2 % (ref 4–5.6)
HGB BLD-MCNC: 11.6 G/DL (ref 11.5–16)
POTASSIUM SERPL-SCNC: 3.9 MMOL/L (ref 3.5–5.1)
SERVICE CMNT-IMP: ABNORMAL
SODIUM SERPL-SCNC: 136 MMOL/L (ref 136–145)

## 2023-03-22 PROCEDURE — 97165 OT EVAL LOW COMPLEX 30 MIN: CPT

## 2023-03-22 PROCEDURE — 97530 THERAPEUTIC ACTIVITIES: CPT

## 2023-03-22 PROCEDURE — 65270000029 HC RM PRIVATE

## 2023-03-22 PROCEDURE — 99232 SBSQ HOSP IP/OBS MODERATE 35: CPT

## 2023-03-22 PROCEDURE — 77010033678 HC OXYGEN DAILY

## 2023-03-22 PROCEDURE — 83036 HEMOGLOBIN GLYCOSYLATED A1C: CPT

## 2023-03-22 PROCEDURE — 36415 COLL VENOUS BLD VENIPUNCTURE: CPT

## 2023-03-22 PROCEDURE — 85018 HEMOGLOBIN: CPT

## 2023-03-22 PROCEDURE — 74011636637 HC RX REV CODE- 636/637

## 2023-03-22 PROCEDURE — 74011250636 HC RX REV CODE- 250/636: Performed by: STUDENT IN AN ORGANIZED HEALTH CARE EDUCATION/TRAINING PROGRAM

## 2023-03-22 PROCEDURE — 97535 SELF CARE MNGMENT TRAINING: CPT

## 2023-03-22 PROCEDURE — 51798 US URINE CAPACITY MEASURE: CPT

## 2023-03-22 PROCEDURE — 74011000258 HC RX REV CODE- 258: Performed by: STUDENT IN AN ORGANIZED HEALTH CARE EDUCATION/TRAINING PROGRAM

## 2023-03-22 PROCEDURE — 94760 N-INVAS EAR/PLS OXIMETRY 1: CPT

## 2023-03-22 PROCEDURE — 97161 PT EVAL LOW COMPLEX 20 MIN: CPT

## 2023-03-22 PROCEDURE — 74011000250 HC RX REV CODE- 250: Performed by: STUDENT IN AN ORGANIZED HEALTH CARE EDUCATION/TRAINING PROGRAM

## 2023-03-22 PROCEDURE — 74011250637 HC RX REV CODE- 250/637: Performed by: STUDENT IN AN ORGANIZED HEALTH CARE EDUCATION/TRAINING PROGRAM

## 2023-03-22 PROCEDURE — 80048 BASIC METABOLIC PNL TOTAL CA: CPT

## 2023-03-22 PROCEDURE — 82962 GLUCOSE BLOOD TEST: CPT

## 2023-03-22 PROCEDURE — 74011636637 HC RX REV CODE- 636/637: Performed by: STUDENT IN AN ORGANIZED HEALTH CARE EDUCATION/TRAINING PROGRAM

## 2023-03-22 RX ORDER — OXYCODONE HYDROCHLORIDE 5 MG/1
10 TABLET ORAL
Status: DISCONTINUED | OUTPATIENT
Start: 2023-03-22 | End: 2023-03-27

## 2023-03-22 RX ORDER — INSULIN LISPRO 100 [IU]/ML
INJECTION, SOLUTION INTRAVENOUS; SUBCUTANEOUS
Status: DISCONTINUED | OUTPATIENT
Start: 2023-03-22 | End: 2023-03-29 | Stop reason: HOSPADM

## 2023-03-22 RX ADMIN — Medication 1 TABLET: at 18:03

## 2023-03-22 RX ADMIN — HYDROMORPHONE HYDROCHLORIDE 0.5 MG: 1 INJECTION, SOLUTION INTRAMUSCULAR; INTRAVENOUS; SUBCUTANEOUS at 06:39

## 2023-03-22 RX ADMIN — Medication 1 AMPULE: at 08:43

## 2023-03-22 RX ADMIN — OXYCODONE HYDROCHLORIDE 5 MG: 5 TABLET ORAL at 04:53

## 2023-03-22 RX ADMIN — AZATHIOPRINE 100 MG: 50 TABLET ORAL at 08:44

## 2023-03-22 RX ADMIN — Medication 1 TABLET: at 08:44

## 2023-03-22 RX ADMIN — INSULIN GLARGINE 20 UNITS: 100 INJECTION, SOLUTION SUBCUTANEOUS at 22:17

## 2023-03-22 RX ADMIN — Medication 2 UNITS: at 08:45

## 2023-03-22 RX ADMIN — HYDROMORPHONE HYDROCHLORIDE 0.5 MG: 1 INJECTION, SOLUTION INTRAMUSCULAR; INTRAVENOUS; SUBCUTANEOUS at 10:40

## 2023-03-22 RX ADMIN — SODIUM CHLORIDE, PRESERVATIVE FREE 10 ML: 5 INJECTION INTRAVENOUS at 06:43

## 2023-03-22 RX ADMIN — Medication 1 TABLET: at 12:49

## 2023-03-22 RX ADMIN — PANTOPRAZOLE SODIUM 40 MG: 40 TABLET, DELAYED RELEASE ORAL at 08:44

## 2023-03-22 RX ADMIN — METFORMIN HYDROCHLORIDE 1000 MG: 500 TABLET, EXTENDED RELEASE ORAL at 18:03

## 2023-03-22 RX ADMIN — CEFAZOLIN 3 G: 10 INJECTION, POWDER, FOR SOLUTION INTRAVENOUS at 06:56

## 2023-03-22 RX ADMIN — OXYCODONE HYDROCHLORIDE 10 MG: 5 TABLET ORAL at 13:39

## 2023-03-22 RX ADMIN — Medication 5 UNITS: at 18:04

## 2023-03-22 RX ADMIN — SODIUM CHLORIDE 125 ML/HR: 9 INJECTION, SOLUTION INTRAVENOUS at 04:21

## 2023-03-22 RX ADMIN — OXYCODONE HYDROCHLORIDE 10 MG: 5 TABLET ORAL at 18:03

## 2023-03-22 RX ADMIN — Medication 1 AMPULE: at 22:18

## 2023-03-22 RX ADMIN — ENOXAPARIN SODIUM 30 MG: 100 INJECTION SUBCUTANEOUS at 08:45

## 2023-03-22 RX ADMIN — MESALAMINE 1600 MG: 400 CAPSULE, DELAYED RELEASE ORAL at 09:37

## 2023-03-22 RX ADMIN — METFORMIN HYDROCHLORIDE 1000 MG: 500 TABLET, EXTENDED RELEASE ORAL at 08:44

## 2023-03-22 RX ADMIN — OXYCODONE HYDROCHLORIDE 10 MG: 5 TABLET ORAL at 22:16

## 2023-03-22 RX ADMIN — Medication 2 UNITS: at 22:17

## 2023-03-22 RX ADMIN — ENOXAPARIN SODIUM 30 MG: 100 INJECTION SUBCUTANEOUS at 22:16

## 2023-03-22 RX ADMIN — THERA TABS 1 TABLET: TAB at 08:44

## 2023-03-22 RX ADMIN — SODIUM CHLORIDE, PRESERVATIVE FREE 10 ML: 5 INJECTION INTRAVENOUS at 22:16

## 2023-03-22 NOTE — PROGRESS NOTES
End of Shift Note    Bedside shift change report given to Shirley Mayes, [facility] (oncoming nurse) by Flor Dunn RN (offgoing nurse). Report included the following information SBAR, Kardex, Intake/Output, MAR, and Recent Results    Shift worked:  7P-7A     Shift summary and any significant changes:     Pt is alert and orientation. Pain controlled by oxycodone and iv dilaudid. Left foot elevated   with pillow and heels up. Pt had X 2 Straight catheter. Concerns for physician to address:       Zone phone for oncoming shift:   [phone]       Activity:  Activity Level: Bed Rest  Number times ambulated in hallways past shift: 0  Number of times OOB to chair past shift: 0    Cardiac:   Cardiac Monitoring: Yes      Cardiac Rhythm: Sinus Rhythm    Access:  Current line(s): PIV     Genitourinary:   Urinary status: due to void    Respiratory:   O2 Device: None (Room air)  Chronic home O2 use?: NO  Incentive spirometer at bedside: YES       GI:     Current diet:  ADULT DIET Regular; 4 carb choices (60 gm/meal)  Passing flatus: YES  Tolerating current diet: YES       Pain Management:   Patient states pain is manageable on current regimen: YES    Skin:  Sergio Score: 18  Interventions: float heels and PT/OT consult    Patient Safety:  Fall Score:  Total Score: 3  Interventions: bed/chair alarm, assistive device (walker, cane, etc), gripper socks, pt to call before getting OOB, and stay with me (per policy)  High Fall Risk: Yes    Length of Stay:  Expected LOS: - - -  Actual LOS: 1      Urbano De Dios RN

## 2023-03-22 NOTE — PROGRESS NOTES
Problem: Mobility Impaired (Adult and Pediatric)  Goal: *Acute Goals and Plan of Care (Insert Text)  Description: FUNCTIONAL STATUS PRIOR TO ADMISSION: Patient was independent and active without use of DME.    HOME SUPPORT PRIOR TO ADMISSION: The patient lived alone with local family to provide assistance. Physical Therapy Goals  Initiated 3/22/2023    1. Patient will move from supine to sit and sit to supine  in bed with minimum assistance with bed in flat position within 4 days. 2. Patient will perform sit to stand with stand by assistance within 4 days. 3. Patient will ambulate with minimum assistance  assist for 15 feet with the least restrictive device within 4 days. 4. Patient will ascend/descend 14 stairs with 1 handrail(s) with modified independence within 4 days. 5. Patient will verbalize and demonstrate understanding of L LE NWB precautions per protocol within 4 days. Outcome: Progressing Towards Goal   PHYSICAL THERAPY EVALUATION  Patient: Stephany Langley (34 y.o. female)  Date: 3/22/2023  Primary Diagnosis: Closed left ankle fracture [S82.892A]  Procedure(s) (LRB):  OPEN REDUCTION INTERNAL FIXATION LEFT LATERAL MALLEOLUS, OPEN REDUCTION INTERNAL FIXATION SYNDESMOSIS AND LATERAL LIGAMENT RECONSTRUCTION(GENERAL WITH POPLITEAL AND ADDUCTOR BLOCK) (Left) 1 Day Post-Op   Precautions:   Fall, NWB    ASSESSMENT  Based on the objective data described below, the patient presents with decreased independence with functional mobility post-op day 1 L ankle ORIF. She demonstrates the ability to transition supine to sit with HOB elevated. Patient demonstrates good sitting balance. She demonstrates fair tolerance to L LE being in dependent position. She is able to stand pivot to bedside chair with the use of a RW. Patient hops 2-3 times to accomplish the transfer. She is provided [facility] and able to stand pivot to commode to the  without the use of RW. Patient voids minimally in BSC.  RW is provided to transfer back to bedside chair. LE are elevated and L LE is propped on two pillow for comfort. Current Level of Function Impacting Discharge (mobility/balance): Min A     Functional Outcome Measure: The patient scored 15/24 on the [facility] outcome measure. Other factors to consider for discharge: medical stability, decreased balance, decreased endurance, decreased independence with functional mobility. Patient lives in 2nd floor apartment. Patient will benefit from skilled therapy intervention to address the above noted impairments. PLAN :  Recommendations and Planned Interventions: bed mobility training, transfer training, gait training, therapeutic exercises, neuromuscular re-education, edema management/control, patient and family training/education, and therapeutic activities      Frequency/Duration: Patient will be followed by physical therapy:  daily to address goals. Recommendation for discharge: (in order for the patient to meet his/her long term goals)  Therapy up to 5 days/week in SNF setting    This discharge recommendation:  Has been made in collaboration with the attending provider and/or case management    IF patient discharges home will need the following DME: to be determined (TBD)         SUBJECTIVE:   Patient stated I'm determined.     OBJECTIVE DATA SUMMARY:   HISTORY:    Past Medical History:   Diagnosis Date    Anemia     Autoimmune disease ([address]     crohn's disease    Closed left ankle fracture 3/21/2023    Crohn disease ([address]     Diabetes ([address]     GERD (gastroesophageal reflux disease)     History of colonoscopy with polypectomy 03/12/2020    Dr. Enriquez Range: Surveillance for UC. Polypectomy (1cm in sigmoid colon)--repeat 3yrs.     Hypertension      Past Surgical History:   Procedure Laterality Date    COLONOSCOPY N/A 6/30/2016    COLONOSCOPY performed by David Villegas MD at [facility] ENDOSCOPY    SIGMOIDOSCOPY,BIOPSY  6/30/2016         UPPER GI ENDOSCOPY,BIOPSY  6/30/2016 Personal factors and/or comorbidities impacting plan of care:     Home Situation  Home Environment: Apartment  # Steps to Enter: 11  One/Two Story Residence: One story  Living Alone: Yes  Support Systems: Friend/Neighbor  Patient Expects to be Discharged to[de-identified] Skilled nursing facility  Current DME Used/Available at Home: None    EXAMINATION/PRESENTATION/DECISION MAKING:   Critical Behavior:  Neurologic State: Alert, Appropriate for age  Orientation Level: Oriented X4  Cognition: Follows commands     Hearing: Auditory  Auditory Impairment: None  Hearing Aids/Status: Does not own  Skin:    Edema:   Range Of Motion:  AROM: Generally decreased, functional           PROM: Generally decreased, functional           Strength:    Strength: Within functional limits                    Tone & Sensation:   Tone: Normal              Sensation: Impaired               Coordination:  Coordination: Within functional limits  Vision:      Functional Mobility:  Bed Mobility:     Supine to Sit: Contact guard assistance;Bed Modified     Scooting: Stand-by assistance  Transfers:  Sit to Stand: Contact guard assistance  Stand to Sit: Contact guard assistance        Bed to Chair: Minimum assistance              Balance:   Sitting: Intact; Without support  Standing: Impaired; With support  Standing - Static: Good  Standing - Dynamic : Fair  Ambulation/Gait Training:                       Left Side Weight Bearing: Non-weight bearing                                 Stairs: Therapeutic Exercises:       Functional Measure:  [employer] AM-PAC®      Basic Mobility Inpatient Short Form (6-Clicks) Version 2  How much HELP from another person do you currently need. .. (If the patient hasn't done an activity recently, how much help from another person do you think they would need if they tried?) Total A Lot A Little None   1. Turning from your back to your side while in a flat bed without using bedrails?  []  1 [x]  2 []  3  [] 4   2.  Moving from lying on your back to sitting on the side of a flat bed without using bedrails? []  1 []  2 [x]  3  []  4   3. Moving to and from a bed to a chair (including a wheelchair)? []  1 []  2 [x]  3  []  4   4. Standing up from a chair using your arms (e.g. wheelchair or bedside chair)? []  1 []  2 [x]  3  []  4   5. Walking in hospital room? []  1 [x]  2 []  3  []  4   6. Climbing 3-5 steps with a railing? []  1 [x]  2 []  3  []  4     Raw Score: 15/24                            Cutoff score ?171,2,3 had higher odds of discharging home with home health or need of SNF/IPR. [address], Stephenie Martinez, Fifi Medina [employer]. Validity of the AM-PAC 6-Clicks Inpatient Daily Activity and Basic Mobility Short Forms. Physical Therapy Mar 2014, [phone]; DOI: 10.2522/ptj.[id number]  2. Catrina Higginbotham. Association of AM-PAC \"6-Clicks\" Basic Mobility and Daily Activity Scores With Discharge Destination. Phys Ther. 2021 Apr 4;101(4):akqs744. doi: 10.1093/ptj/xxme531. PMID: 50860980. V Beverly Granger, Jesse D, Sarkis Colunga, Harpreet K, Osmin S. Activity Measure for Post-Acute Care \"6-Clicks\" Basic Mobility Scores Predict Discharge Destination After Acute Care Hospitalization in Select Patient Groups: A Retrospective, Observational Study. Arch Rehabil Res Clin Transl. 2022 Jul 16;4(3):994313. doi: 10.1016/j.arrct. 0619.610287. PMID: 34369155; PMCID: [id number]. 4. Laya Davenport, Xiao W, Nina NIETO. AM-PAC Short Forms Manual 4.0. Revised 2/2020.          Physical Therapy Evaluation Charge Determination   History Examination Presentation Decision-Making   MEDIUM  Complexity : 1-2 comorbidities / personal factors will impact the outcome/ POC  LOW Complexity : 1-2 Standardized tests and measures addressing body structure, function, activity limitation and / or participation in recreation  MEDIUM Complexity : Evolving with changing characteristics  Other outcome measures [facility]  HIGH       Based on the above components, the patient evaluation is determined to be of the following complexity level: MEDIUM    Pain Rating:      Activity Tolerance:   Fair    After treatment patient left in no apparent distress:   Sitting in chair, Heels elevated for pressure relief, Call bell within reach, and Caregiver / family present    COMMUNICATION/EDUCATION:   The patients plan of care was discussed with: Occupational therapist and Registered nurse. Fall prevention education was provided and the patient/caregiver indicated understanding., Patient/family have participated as able in goal setting and plan of care. , and Patient/family agree to work toward stated goals and plan of care.     Thank you for this referral.  Nathan Valencia, PT   Time Calculation: 20 mins

## 2023-03-22 NOTE — PROGRESS NOTES
End of Shift Note    Bedside shift change report given to , RN (oncoming nurse) by Rosibel Maldonado (offgoing nurse). Report included the following information SBAR, Kardex, Intake/Output, MAR, and Recent Results    Shift worked:  day     Shift summary and any significant changes:     Pain controlled by oxycodone  Left foot elevated  with pillow and heels up. Patient voiding. Concerns for physician to address:       Zone phone for oncoming shift:   [phone]       Activity:  Activity Level: Bed Rest  Number times ambulated in hallways past shift: 0  Number of times OOB to chair past shift: 0    Cardiac:   Cardiac Monitoring: Yes      Cardiac Rhythm: Sinus Rhythm    Access:  Current line(s): PIV     Genitourinary:   Urinary status: voiding    Respiratory:   O2 Device: None (Room air)  Chronic home O2 use?: NO  Incentive spirometer at bedside: YES       GI:     Current diet:  ADULT DIET Regular; 4 carb choices (60 gm/meal)  Passing flatus: YES  Tolerating current diet: YES       Pain Management:   Patient states pain is manageable on current regimen: YES    Skin:  Sergio Score: 18  Interventions: float heels and PT/OT consult    Patient Safety:  Fall Score:  Total Score: 3  Interventions: bed/chair alarm, assistive device (walker, cane, etc), gripper socks, pt to call before getting OOB, and stay with me (per policy)  High Fall Risk: Yes    Length of Stay:  Expected LOS: 2d 16h  Actual LOS: [address]

## 2023-03-22 NOTE — PROGRESS NOTES
P&T-Approved DVT Prophylaxis Dosing    Per P&T Committee-approved protocol 40 mg sq q 24h has been adjusted to 30 mg sq q 12h  based on weight and renal function as shown in the table below.          Jimmy Dietz [facility]

## 2023-03-22 NOTE — PROGRESS NOTES
Transition of Care Plan:  RUR: 7%  Disposition: SNF  If SNF or IPR: Date FOC offered: 3/22  Date FOC received:  Date authorization started with reference number:  Date authorization received and expires:  Accepting facility:  Follow up appointments: ortho  DME needed: n/a  Transportation at Discharge: [facility]  [employer] or means to access home:  n/a      IM Medicare Letter: n/a  Caregiver Contact: sister Karime Ramires [phone]  Discharge Caregiver contacted prior to discharge? yes  Care Conference needed?: no                  Reason for Admission:  ankle fracture                    RUR Score: 7%                    Plan for utilizing home health:  per recommendation         PCP: First and Last name:  Mima Rojas MD   Name of Practice: [id number]   Are you a current patient: Yes/No: yes   Approximate date of last visit: 3/20/23   Can you participate in a virtual visit with your PCP: yes                    Current Advanced Directive/Advance Care Plan: declined at this time       Healthcare Decision Maker:   Click here to [address] including selection of the Healthcare Decision Maker Relationship (ie \"Primary\")                       Transition of Care Plan:                      CM made room visit with patient who was alert and oriented. Pt confirmed demographics, insurance, and emergency contact on file. Pt lives alone in a 2nd floor apartment with no elevator access. At baseline pt is fully independent. Pt has no hx of HH or rehab. Pt will be NWB for several weeks. Pt agreeable to SNF placement. SNF list provided, CM to follow up this afternoon to obtain at least 3 choices. Care Management Interventions  PCP Verified by CM:  Yes  Mode of Transport at Discharge: BLS  Transition of Care Consult (CM Consult): Discharge Planning, SNF  Discharge Durable Medical Equipment: No  Physical Therapy Consult: Yes  Occupational Therapy Consult: Yes  Speech Therapy Consult: No  Support Systems: Other Family Member(s)  Confirm Follow Up Transport: Family  Discharge Location  Patient Expects to be Discharged to[de-identified] [facility], [place], [place]

## 2023-03-22 NOTE — PROGRESS NOTES
Problem: Falls - Risk of  Goal: *Absence of Falls  Description: Document Karoline Horton Fall Risk and appropriate interventions in the flowsheet. Outcome: Progressing Towards Goal  Note: Fall Risk Interventions:            Medication Interventions: Patient to call before getting OOB    Elimination Interventions: Patient to call for help with toileting needs    History of Falls Interventions:  Investigate reason for fall, Room close to nurse's station

## 2023-03-22 NOTE — PROGRESS NOTES
Ortho / Neurosurgery NP Note    POD# 0  s/p OPEN REDUCTION INTERNAL FIXATION LEFT LATERAL MALLEOLUS, OPEN REDUCTION INTERNAL FIXATION SYNDESMOSIS AND LATERAL LIGAMENT RECONSTRUCTION(GENERAL WITH POPLITEAL AND ADDUCTOR BLOCK)   Pt seen with sister at bedside. Pt resting in bed. Awake and alert, in NAD. Reports postop pain 3/10, recently received IV dilaudid   Discussed optimizing PO regimen with increase in oxycodone to 10mg    Denies numbness or tingling in LLE  Straight cath x2 overnight, denies issues voiding PTA, reports inability to void on a bedpan or with purwick   Tolerating regular diet. No nausea. Hx Chron's, bowel regimen d/c    VSS Afebrile. Room air.      Visit Vitals  /61   Pulse 93   Temp 98.6 °F (37 °C)   Resp 17   Ht 5' 9\" (1.753 m)   Wt 126.2 kg (278 lb 3.5 oz)   LMP 01/20/2023 (Approximate)   SpO2 93%   BMI 41.09 kg/m²       Voiding status: straight cath x2, hopefully can start using BSC today   Unmeasurable Output  Urine Occurrence(s): 1 (03/21/23 1420)  Straight Cath  Straight Cath: Nurse performed cath;Sterile technique used (03/22/23 0506)  Number of Attempts: 1 (03/22/23 0506)  Catheter Size: 16 FR (03/22/23 0506)  Time Catheter Inserted: 1609 (03/22/23 0506)  Time Catheter Removed: 0960 (03/22/23 0506)  Urine: 800 mL (03/22/23 0506)      Labs    Lab Results   Component Value Date/Time    HGB 11.6 03/22/2023 04:33 AM      No results found for: INR, INREXT   Lab Results   Component Value Date/Time    Sodium 136 03/22/2023 04:33 AM    Potassium 3.9 03/22/2023 04:33 AM    Chloride 104 03/22/2023 04:33 AM    CO2 27 03/22/2023 04:33 AM    Glucose 237 (H) 03/22/2023 04:33 AM    BUN 13 03/22/2023 04:33 AM    Creatinine 0.58 03/22/2023 04:33 AM    Calcium 8.9 03/22/2023 04:33 AM     Recent Glucose Results:   Lab Results   Component Value Date/Time     (H) 03/22/2023 04:33 AM    GLUCPOC 198 (H) 03/22/2023 06:58 AM    GLUCPOC 226 (H) 03/21/2023 09:17 PM    GLUCPOC 198 (H) 03/21/2023 05:51 PM           Body mass index is 41.09 kg/m². : A BMI > 30 is classified as obesity and > 40 is classified as morbid obesity. Dressing c. d. I - added pillows to adequately elevated LLE   Calves soft and supple; No pain with passive stretch  Sensation and motor intact. +PF/DF/EHL intact. LLE skin warm/pink, +cap refill, unable to palpate pedal pulse due to cast  SCDs for mechanical DVT proph while in bed     PLAN:  1) PT/OT - NWB   2) Lovenox 30 mg BID for DVT Prophylaxis   3) Pain control - scheduled tylenol, and prn  oxycodone  5-10 mg q4hrs. Dilaudid IV for severe breakthrough. 4) Uncontrolled DM2 -  A1C 9.0 (4/2022). A1C ordered today, pending. Receiving Lantus and SSI. DTC consulted. 5) Discharge planning - Lives alone in second floor apartment. Will need SNF placement.      Micah Ocasio NP

## 2023-03-22 NOTE — PROGRESS NOTES
Problem: Self Care Deficits Care Plan (Adult)  Goal: *Acute Goals and Plan of Care (Insert Text)  Description: FUNCTIONAL STATUS PRIOR TO ADMISSION: Patient was independent and active without use of DME.     HOME SUPPORT: The patient lived alone with sister to provide assistance. Occupational Therapy Goals  Initiated 3/22/2023  1. Patient will perform grooming with independence within 7 day(s). 2.  Patient will perform upper body dressing with independence within 7 day(s). 3.  Patient will perform lower body dressing with independence within 7 day(s). 4.  Patient will perform toilet transfers with modified independence within 7 day(s). 5.  Patient will perform all aspects of toileting with modified independence within 7 day(s). 6.  Patient will utilize energy conservation techniques during functional activities with verbal cues within 7 day(s). Outcome: Progressing Towards Goal     OCCUPATIONAL THERAPY EVALUATION  Patient: Maia Khan (4 y.o. female)  Date: 3/22/2023  Primary Diagnosis: Closed left ankle fracture [S82.892A]  Procedure(s) (LRB):  OPEN REDUCTION INTERNAL FIXATION LEFT LATERAL MALLEOLUS, OPEN REDUCTION INTERNAL FIXATION SYNDESMOSIS AND LATERAL LIGAMENT RECONSTRUCTION(GENERAL WITH POPLITEAL AND ADDUCTOR BLOCK) (Left) 1 Day Post-Op   Precautions:   Fall, NWB    ASSESSMENT  Based on the objective data described below, the patient presents with NWB on L LE due to ankle fracture requiring ORIF, general weakness, decreased endurance, and decreased balance. Pt is performing below her independent baseline, currently completing ADLs at a set-up to mod A level and functional mobility at a supervision to min A level. Pt is able to complete dressing ADLs sitting EOB, requiring compensatory training to don LB clothing. Pt required the RW to stand pivot to the [facility]. Pt was educated on hand placement during transfers, demonstrated inconsistent carryover, and currently requires verbal cues.  Pt will benefit from acute OT and would benefit from SNF at discharge so OT can continue to address functional ADLs, transfers, and home safety. Current Level of Function Impacting Discharge (ADLs/self-care): setup to mod A    Functional Outcome Measure: The patient scored Total: 65/100 on the Barthel Index outcome measure which is indicative of being partially dependent in basic self-care. Other factors to consider for discharge: must go up 11 steps at apartment complex to get to apartment     Patient will benefit from skilled therapy intervention to address the above noted impairments. PLAN :  Recommendations and Planned Interventions: self care training, functional mobility training, therapeutic exercise, balance training, therapeutic activities, endurance activities, patient education, and home safety training    Frequency/Duration: Patient will be followed by occupational therapy 4 times a week to address goals. Recommendation for discharge: (in order for the patient to meet his/her long term goals)  Therapy up to 5 days/week in SNF setting    This discharge recommendation:  Has been made in collaboration with the attending provider and/or case management    IF patient discharges home will need the following DME: transfer bench and walker: rolling       SUBJECTIVE:   Patient stated I just got comfortable.     OBJECTIVE DATA SUMMARY:   HISTORY:   Past Medical History:   Diagnosis Date    Anemia     Autoimmune disease ([facility]     crohn's disease    Closed left ankle fracture 3/21/2023    Crohn disease ([facility] [facility]     Diabetes ([facility] [address] [facility]     GERD (gastroesophageal reflux disease)     History of colonoscopy with polypectomy 03/12/2020    Dr. Adryan Hanna: Surveillance for UC. Polypectomy (1cm in sigmoid colon)--repeat 3yrs.     Hypertension      Past Surgical History:   Procedure Laterality Date    COLONOSCOPY N/A 6/30/2016    COLONOSCOPY performed by Mina Morrissey MD at [facility] ENDOSCOPY    SIGMOIDOSCOPY,BIOPSY  6/30/2016 UPPER GI ENDOSCOPY,BIOPSY  6/30/2016            Expanded or extensive additional review of patient history:     Home Situation  Home Environment: Apartment  # Steps to Enter: 11  One/Two Story Residence: One story  Living Alone: Yes  Support Systems: Other Family Member(s)  Patient Expects to be Discharged to[de-identified] Skilled nursing facility  Current DME Used/Available at Home: Shower chair  Tub or Shower Type: Tub/Shower combination    Hand dominance: Right    EXAMINATION OF PERFORMANCE DEFICITS:  Cognitive/Behavioral Status:  Neurologic State: Alert  Orientation Level: Appropriate for age;Oriented X4  Cognition: Follows commands  Perception: Appears intact  Perseveration: No perseveration noted  Safety/Judgement: Awareness of environment    Hearing: Auditory  Auditory Impairment: None  Hearing Aids/Status: Does not own    Vision/Perceptual:    Acuity: Within Defined Limits         Range of Motion:  AROM: Generally decreased, functional  PROM: Generally decreased, functional    Strength:  Strength: Generally decreased, functional    Coordination:  Coordination: Within functional limits    Tone & Sensation:  Tone: Normal  Sensation: Impaired    Balance:  Sitting: Intact; Without support  Standing: Impaired; With support  Standing - Static: Good  Standing - Dynamic : Fair;Constant support    Functional Mobility and Transfers for ADLs:  Bed Mobility:  Rolling: Supervision  Supine to Sit: Supervision  Sit to Supine: Supervision  Scooting: Supervision    Transfers:  Sit to Stand: Contact guard assistance  Stand to Sit: Contact guard assistance  Bed to Chair: Minimum assistance  Toilet Transfer : Minimum assistance; Adaptive equipment [facility])  Assistive Device : Walker, rolling    ADL Assessment:  Feeding: Setup    Oral Facial Hygiene/Grooming: Setup    Bathing:  Moderate assistance    Upper Body Dressing: Setup    Lower Body Dressing: Contact guard assistance    Toileting: Contact guard assistance    ADL Intervention and task modifications:    Upper Body Dressing Assistance  Bra: Set-up  Pullover Shirt: Set-up    Lower Body Dressing Assistance  Underpants: Supervision; Compensatory technique training  Socks: Stand-by assistance  Leg Crossed Method Used: Yes  Cues: Verbal cues provided    Cognitive Retraining  Safety/Judgement: Awareness of environment    Functional Measure:    Barthel Index:  Bathin  Bladder: 10  Bowels: 10  Groomin  Dressin  Feeding: 10  Mobility: 10  Stairs: 0  Toilet Use: 5  Transfer (Bed to Chair and Back): 10  Total: 65/100      The Barthel ADL Index: Guidelines  1. The index should be used as a record of what a patient does, not as a record of what a patient could do. 2. The main aim is to establish degree of independence from any help, physical or verbal, however minor and for whatever reason. 3. The need for supervision renders the patient not independent. 4. A patient's performance should be established using the best available evidence. Asking the patient, friends/relatives and nurses are the usual sources, but direct observation and common sense are also important. However direct testing is not needed. 5. Usually the patient's performance over the preceding 24-48 hours is important, but occasionally longer periods will be relevant. 6. Middle categories imply that the patient supplies over 50 per cent of the effort. 7. Use of aids to be independent is allowed. Score Interpretation (from [address] [address])    Independent   60-79 Minimally independent   40-59 Partially dependent   20-39 Very dependent   <20 Totally dependent     -Inocencia Verde., Barthel, D.W. (1965). Functional evaluation: the Barthel Index. [address] [facility] ([address] (1997). The Barthel activities of daily living index: self-reporting versus actual performance in the old (> or = 75 years).  Journal of [address] [place] [address] 45(7), [facility], J.J.M., [facility]., Hay Celmons (1999). Measuring the change in disability after inpatient rehabilitation; comparison of the responsiveness of the Barthel Index and Functional [place] Measure. Journal of Neurology, Neurosurgery, and Psychiatry, 66(4), 650-731. FRANCOIS Hatfield, FLORES Zarate, & Aubrey Lee M.A. (2004) Assessment of post-stroke quality of life in cost-effectiveness studies: The usefulness of the Barthel Index and the EuroQoL-5D. Quality of Life Research, 13, 427-43     Pain Rating:  No complete of pain    Activity Tolerance:   Good and tolerates ADLs without rest breaks    After treatment patient left in no apparent distress:    Supine in bed, Call bell within reach, Bed / chair alarm activated, Caregiver / family present, and Side rails x 3    COMMUNICATION/EDUCATION:   The patients plan of care was discussed with: Registered nurse. Home safety education was provided and the patient/caregiver indicated understanding. This patients plan of care is appropriate for delegation to [facility].     Thank you for this referral.  Mildred Gamez OT  Time Calculation: 30 mins

## 2023-03-22 NOTE — DISCHARGE INSTRUCTIONS
Weightbearing:  Nonweightbearing on your operative extremity. You may use crutches, a rolling knee scooter, or walker to help with ambulation. Dressings:    Leave your dressing/splint in place until your post-operative visit. Keep it clean and dry. Mild to moderate blood or drainage after surgery is expected. Blood Clot Medication:    You have been prescribed Lovenox to help reduce your risk of developing blood clots after surgery. You should start this the morning following your surgery. Pain Control: For pain control, you have been prescribed a narcotic pain medication. You should wean from the narcotic medication as you are able. Do not drink alcohol or drive while using narcotic pain medication. You should also keep your foot elevated as much as possible. Avoid pressure under the heel by placing pillows under the calf, not your foot. Constipation:    While taking narcotics, you may have constipation. A stool softener is recommended. You may use over-the-counter stool softeners such as Miralax, Colace, and Senna. Nausea and Vomiting:    Sometimes after surgery, patients have nausea or vomiting. A medicine (Zofran) has been prescribed for this. If you do not have nausea or vomiting, then you do you not need to take this. General Considerations:  1. Keep your foot elevated as much as possible after surgery. It is ideal to have it positioned above your heart to allow swelling to subside. You can place it on several pillows or on the back of the couch. While eating, rest it on another chair at the dinner table. The worst swelling occurs the frst week or two after surgery, but can take much longer up to months to fully subside. 2. Placing ice packs on the outside of the bandage may help with pain and swelling. We recommend 20-30 minutes on and then 90 minutes off.   Also, do not place ice packs directly on the skin or on the toes themselves (which can impair circulation to the toes). 3. Keep your bandage or dressing dry. If it becomes wet, please contact our office. It will likely be necessary to have you come in to be evaluated and have it changed to a dry one. A wet, saturated dressing can lead to problems with stitches and wound healing. 4. Bathing or showering can be challenging. It is important to keep the dressing or cast dry, so covering it with a cast cover or garbage bag can help. It is important to avoid submerging the leg in case the cover leaks. Sitting in the tub or on a shower chair is also recommended, as it is not safe to balance or stand on one leg.  5. Once your sutures have been removed, it is helpful to massage your scar(s) two to three times daily. This can help moisturize the incision, decrease sensitivity, and break up scar tissue. We recommend you use common over-the-counter products such as cocoa butter, vitamin E oil, or Mederma. Rub along the scar as well across the scar side to side. 6. Do not drive until instructed by your surgeon. You should not drive while taking narcotic pain medications. Even if surgery was performed on your left foot, driving for long periods can lead to swelling and discomfort due to the foot being down. Once you are allowed to drive, try shorter trips to get accustomed to operating a car again. It may help to practice in an empty parking lot to get used to controlling the pedals.

## 2023-03-22 NOTE — DIABETES MGMT
[address] [facility]    CLINICAL NURSE SPECIALIST CONSULT     Initial Presentation   Yeimi Ewing is a 49 y.o. female admitted 3/21/2023 after experiencing a left leg fracture . LAB:Glucose 228. Creatine 0.78. GFR >60. A1c 9.2%  Date of Procedure: 3/21/2023      Pre-Op Diagnosis:   LEFT LATERAL MALLEOLUS FRACTURE  UNCONTROLLED TYPE II DIABETES WITH NEUROPATHY     Post-Op Diagnosis:   LEFT LATERAL MALLEOLUS FRACTURE  LATERAL LIGAMENT INSTABILITY  UNCONTROLLED TYPE II DIABETES WITH NEUROPATHY     Procedure(s):  OPEN REDUCTION INTERNAL FIXATION LEFT LATERAL MALLEOLUS,   OPEN REDUCTION INTERNAL FIXATION SYNDESMOSIS  LATERAL LIGAMENT RECONSTRUCTION     Surgeon(s):  Angela Candelaria MD     Surgical Assistant: Surg Asst-1: Roya Lujan     Anesthesia: General      Estimated Blood Loss (mL): less than 821      Complications: None     Specimens: * No specimens in log *      Implants:   Implant Name Type Inv. Item Serial No.  Lot No. LRB No. Used Action   SCREW BNE L20MM DIA3.5MM NONLOCKING FOR R3CON PLATING SYS - SN/A   SCREW BNE L20MM DIA3.5MM NONLOCKING FOR R3CON PLATING SYS N/A PARAGON 28_WD N/A Left 1 Implanted   11 hole left plate     N/A PARAGON 28_WD N/A Left 1 Implanted   SCREW BNE L12MM DIA3. 5MM R3CON BURAK PLT [place] PLATING SYS - SN/A   SCREW BNE L12MM DIA3. 5MM R3CON BURAK PLT [place] PLATING SYS N/A PARAGON 28_WD N/A Left 1 Implanted   SCREW BNE L14MM DIA3.5MM NONLOCKING FOR R3CON PLATING SYS - SN/A   SCREW BNE L14MM DIA3.5MM NONLOCKING FOR R3CON PLATING SYS N/A PARAGON 28_WD N/A Left 3 Implanted   SCREW BNE L10MM DIA3. 5MM BURAK FOR R3CON PLATING SYS [place] - SN/A   SCREW BNE L10MM DIA3. 5MM BURAK FOR R3CON PLATING SYS [place] N/A PARAGON 28_WD N/A Left 1 Implanted   3.5 x 52mm non-locking screw     N/A PARAGON 28_WD N/A Left 1 Implanted   SCREW BNE L50MM [id number] NONLOCKING [place] PLATING SYS R3CON - SN/A   SCREW BNE L50MM [id number] NONLOCKING [place] PLATING SYS R3CON N/A PARAGON 28_WD N/A Left 1 Implanted   3.5 x 58mm non-locking screw     N/A PARAGON 28_WD N/A Left 1 Implanted   DX FIBERTAK SUTURE ANCHOR #2 MTS WITH NDL - [id number]   DX FIBERTAK SUTURE ANCHOR #2 MTS WITH NDL [id number] [address] [id number] Left 1 Implanted   DX FIBERTAK SUTURE ANCHOR #2 MTS WITH NDL - [id number]   DX FIBERTAK SUTURE ANCHOR #2 MTS WITH NDL [id number] [address] [id number] Left 1 Implanted   INTERNAL BRACE IMPLANT SYSTEM LIGAMENT AUGMENTATION REPAIR, BIOCOMPOSITE with JUMPSTART DRESSING     [id number] ARTHREX [id number] Left 1 Implanted        HX:   Past Medical History:   Diagnosis Date    Anemia     Autoimmune disease ([address]     crohn's disease    Closed left ankle fracture 3/21/2023    Crohn disease ([facility]     Diabetes ([facility] [address]     GERD (gastroesophageal reflux disease)     History of colonoscopy with polypectomy 03/12/2020    Dr. Jenin Davis: Surveillance for UC. Polypectomy (1cm in sigmoid colon)--repeat 3yrs. Hypertension         INITIAL DX:   Closed left ankle fracture [W49.437K]     Current Treatment     TX: azathioprine. Clot prevention. Insulin. Mesalamine. Metformin. Protonix. Consulted by Provider for advanced diabetes nursing assessment and care for:   [] Transitioning off Catie Earing   [x] Inpatient management strategy  [x] Home management assessment  [] Survival skill education    Hospital Course   Clinical progress has been uncomplicated . Diabetes History   The patient reports a hx of Type 2 diabetes. She reportedtly takes 20 units Lantus and Metformin. The patient was taken off [place] in February 2023 after an episode of DKA. The patient resides alone and reports a strong support system of friends and family.      Diabetes-related Medical History    Neurological complications  Peripheral neuropathy  Macrovascular disease  Cerebral vascular accident      Diabetes Medication History  Key Antihyperglycemic Medications               insulin glargine (LANTUS) 100 unit/mL injection (Taking) by SubCUTAneous route nightly. metFORMIN ER (GLUCOPHAGE XR) 500 mg tablet (Taking) TAKE 2 TABLETS BY MOUTH TWICE DAILY WITH MEALS             Diabetes self-management practices:   Eating pattern   [x] Eating a carbohydrate-controlled mealplan  ( Improving)    Monitoring pattern   [x] Testing BGs sufficiently to inform self-management adjustments    Taking medications pattern  [x] Consistent administration  [x] Affordable    Overall evaluation:    [x] Not achieving A1c target with drug therapy & self-care practices    Subjective   I am trying to eat the right things.      Objective   Physical exam  General Obese female in no acute distress.  Conversant and cooperative  Neuro  Alert, oriented   Vital Signs Visit Vitals  /79   Pulse 99   Temp 98.6 °F (37 °C)   Resp 16   Ht 5' 9\" (1.753 m)   Wt 126.2 kg (278 lb 3.5 oz)   SpO2 97%   BMI 41.09 kg/m²         Laboratory  Recent Labs     03/22/23  0433 03/21/23  1033   * 228*   AGAP 5 5   WBC  --  4.5   CREA 0.58 0.78         Factors impacting BG management  Factor Dose Comments   Nutrition:  Standard meals     60 grams/meal      Pain PRN oxycodone  PRN Dilaudid    Infection Post-op Ancef Completed   Other:   Kidney function       Normal          Blood glucose pattern      Significant diabetes-related events over the past 24-72 hours  3/22/23 BG's  ranging 198-237 for past 24 hours    Assessment and Plan   Nursing Diagnosis Risk for unstable blood glucose pattern   Nursing Intervention Domain 8103 Decision-making Support   Nursing Interventions Examined current inpatient diabetes/blood glucose control   Explored factors facilitating and impeding inpatient management  Explored corrective strategies with patient and responsible inpatient provider   Informed patient of rational for insulin strategy while hospitalized     Nursing Diagnosis 32814 Ineffective Health Management   Nursing Intervention Domain [facility] Interventions Identified diabetes self-management practices impeding diabetes control  Discussed diabetes survival skills related to  Healthy Plate eating plan; given handouts  Role of physical activity in improving insulin sensitivity and action  Procedure for blood glucose monitoring & options for low-cost products available from [facility]   Medications plan at discharge     Evaluation   This 49year old female with Type 2 diabetes did not achieve Bg control prior to admission as evidenced by an A1c of 9.2%. The patient is using Lantus 20 units and Metformin at home. BG's have been elevated at home. The A1c demonstrates that the patient may require an increase in basal insulin at discharge. The patient has been ordered her home dose of 20 units lantus to start this evening. I suspect she may require more. I will review Bg trends and make changes/ recommendations as needed. Order/Plan   Continue with current diabetes medication regimen for now. Billing Code(s)   [] [id number] IP subsequent hospital care - 50 minutes   [x] 31913 IP subsequent hospital care - 35 minutes   [] 23718 IP subsequent hospital care - 25 minutes     Before making these care recommendations, I personally reviewed the hospitalization record, including notes, laboratory & diagnostic data and current medications, and examined the patient at the bedside (circumstances permitting) before making care recommendations. More than fifty (50) percent of the time was spent in patient counseling and/or care coordination.   Total minutes: 35 minutes    CARLO Diez  Diabetes Clinical Nurse Specialist  [facility]  Access via [employer]

## 2023-03-22 NOTE — PROGRESS NOTES
Problem: Falls - Risk of  Goal: *Absence of Falls  Description: Document Agnes Chadbourn Fall Risk and appropriate interventions in the flowsheet. Outcome: Progressing Towards Goal  Note: Fall Risk Interventions:                                Problem: Patient Education: Go to Patient Education Activity  Goal: Patient/Family Education  Outcome: Progressing Towards Goal     Problem: Pain  Goal: *Control of Pain  Outcome: Progressing Towards Goal     Problem: Patient Education: Go to Patient Education Activity  Goal: Patient/Family Education  Outcome: Progressing Towards Goal     Problem: Pressure Injury - Risk of  Goal: *Prevention of pressure injury  Description: Document Sergio Scale and appropriate interventions in the flowsheet.   Outcome: Progressing Towards Goal  Note: Pressure Injury Interventions:                      Nutrition Interventions: Document food/fluid/supplement intake                     Problem: Patient Education: Go to Patient Education Activity  Goal: Patient/Family Education  Outcome: Progressing Towards Goal

## 2023-03-23 LAB
ATRIAL RATE: 80 BPM
CALCULATED P AXIS, ECG09: 51 DEGREES
CALCULATED R AXIS, ECG10: 24 DEGREES
CALCULATED T AXIS, ECG11: 33 DEGREES
DIAGNOSIS, 93000: NORMAL
GLUCOSE BLD STRIP.AUTO-MCNC: 111 MG/DL (ref 65–117)
GLUCOSE BLD STRIP.AUTO-MCNC: 190 MG/DL (ref 65–117)
GLUCOSE BLD STRIP.AUTO-MCNC: 199 MG/DL (ref 65–117)
GLUCOSE BLD STRIP.AUTO-MCNC: 209 MG/DL (ref 65–117)
HGB BLD-MCNC: 10.7 G/DL (ref 11.5–16)
P-R INTERVAL, ECG05: 156 MS
Q-T INTERVAL, ECG07: 384 MS
QRS DURATION, ECG06: 86 MS
QTC CALCULATION (BEZET), ECG08: 442 MS
SERVICE CMNT-IMP: ABNORMAL
SERVICE CMNT-IMP: NORMAL
VENTRICULAR RATE, ECG03: 80 BPM

## 2023-03-23 PROCEDURE — 65270000029 HC RM PRIVATE

## 2023-03-23 PROCEDURE — 85018 HEMOGLOBIN: CPT

## 2023-03-23 PROCEDURE — 82962 GLUCOSE BLOOD TEST: CPT

## 2023-03-23 PROCEDURE — 74011000250 HC RX REV CODE- 250: Performed by: STUDENT IN AN ORGANIZED HEALTH CARE EDUCATION/TRAINING PROGRAM

## 2023-03-23 PROCEDURE — 74011250637 HC RX REV CODE- 250/637

## 2023-03-23 PROCEDURE — 36415 COLL VENOUS BLD VENIPUNCTURE: CPT

## 2023-03-23 PROCEDURE — 74011250637 HC RX REV CODE- 250/637: Performed by: STUDENT IN AN ORGANIZED HEALTH CARE EDUCATION/TRAINING PROGRAM

## 2023-03-23 PROCEDURE — 97116 GAIT TRAINING THERAPY: CPT | Performed by: PHYSICAL THERAPIST

## 2023-03-23 PROCEDURE — 74011250636 HC RX REV CODE- 250/636: Performed by: STUDENT IN AN ORGANIZED HEALTH CARE EDUCATION/TRAINING PROGRAM

## 2023-03-23 PROCEDURE — 74011636637 HC RX REV CODE- 636/637

## 2023-03-23 PROCEDURE — 97535 SELF CARE MNGMENT TRAINING: CPT

## 2023-03-23 PROCEDURE — 97530 THERAPEUTIC ACTIVITIES: CPT | Performed by: PHYSICAL THERAPIST

## 2023-03-23 PROCEDURE — 94760 N-INVAS EAR/PLS OXIMETRY 1: CPT

## 2023-03-23 PROCEDURE — 74011250637 HC RX REV CODE- 250/637: Performed by: NURSE PRACTITIONER

## 2023-03-23 RX ORDER — INSULIN GLARGINE 100 [IU]/ML
30 INJECTION, SOLUTION SUBCUTANEOUS
Status: DISCONTINUED | OUTPATIENT
Start: 2023-03-23 | End: 2023-03-29 | Stop reason: HOSPADM

## 2023-03-23 RX ORDER — AMOXICILLIN 250 MG
1 CAPSULE ORAL 2 TIMES DAILY
Qty: 14 TABLET | Refills: 0 | Status: SHIPPED
Start: 2023-03-23 | End: 2023-03-28

## 2023-03-23 RX ORDER — FERROUS SULFATE, DRIED 160(50) MG
1 TABLET, EXTENDED RELEASE ORAL
Qty: 90 TABLET | Refills: 2 | Status: SHIPPED | OUTPATIENT
Start: 2023-03-23 | End: 2023-06-21

## 2023-03-23 RX ORDER — AMOXICILLIN 250 MG
1 CAPSULE ORAL 2 TIMES DAILY
Status: DISCONTINUED | OUTPATIENT
Start: 2023-03-23 | End: 2023-03-27

## 2023-03-23 RX ORDER — POLYETHYLENE GLYCOL 3350 17 G/17G
17 POWDER, FOR SOLUTION ORAL DAILY
Qty: 7 PACKET | Refills: 0 | Status: SHIPPED
Start: 2023-03-23 | End: 2023-03-28

## 2023-03-23 RX ORDER — ENOXAPARIN SODIUM 100 MG/ML
30 INJECTION SUBCUTANEOUS EVERY 12 HOURS
Qty: 24 ML | Refills: 0 | Status: SHIPPED
Start: 2023-03-23 | End: 2023-05-02

## 2023-03-23 RX ORDER — GLIPIZIDE 5 MG/1
5 TABLET ORAL
Status: DISCONTINUED | OUTPATIENT
Start: 2023-03-23 | End: 2023-03-29 | Stop reason: HOSPADM

## 2023-03-23 RX ORDER — POLYETHYLENE GLYCOL 3350 17 G/17G
17 POWDER, FOR SOLUTION ORAL DAILY
Status: DISCONTINUED | OUTPATIENT
Start: 2023-03-23 | End: 2023-03-27

## 2023-03-23 RX ORDER — ENOXAPARIN SODIUM 100 MG/ML
30 INJECTION SUBCUTANEOUS EVERY 12 HOURS
Qty: 18 ML | Refills: 0 | Status: SHIPPED
Start: 2023-03-23 | End: 2023-03-23 | Stop reason: SDUPTHER

## 2023-03-23 RX ORDER — OXYCODONE HYDROCHLORIDE 5 MG/1
5-10 TABLET ORAL
Qty: 30 TABLET | Refills: 0 | Status: SHIPPED | OUTPATIENT
Start: 2023-03-23 | End: 2023-03-30

## 2023-03-23 RX ADMIN — ENOXAPARIN SODIUM 30 MG: 100 INJECTION SUBCUTANEOUS at 21:06

## 2023-03-23 RX ADMIN — OXYCODONE HYDROCHLORIDE 10 MG: 5 TABLET ORAL at 22:25

## 2023-03-23 RX ADMIN — Medication 1 AMPULE: at 21:06

## 2023-03-23 RX ADMIN — GLIPIZIDE 5 MG: 5 TABLET ORAL at 09:42

## 2023-03-23 RX ADMIN — METFORMIN HYDROCHLORIDE 1000 MG: 500 TABLET, EXTENDED RELEASE ORAL at 08:29

## 2023-03-23 RX ADMIN — Medication 2 UNITS: at 08:28

## 2023-03-23 RX ADMIN — PANTOPRAZOLE SODIUM 40 MG: 40 TABLET, DELAYED RELEASE ORAL at 08:29

## 2023-03-23 RX ADMIN — Medication 3 UNITS: at 12:27

## 2023-03-23 RX ADMIN — Medication 1 TABLET: at 17:37

## 2023-03-23 RX ADMIN — SODIUM CHLORIDE, PRESERVATIVE FREE 10 ML: 5 INJECTION INTRAVENOUS at 05:52

## 2023-03-23 RX ADMIN — INSULIN GLARGINE 30 UNITS: 100 INJECTION, SOLUTION SUBCUTANEOUS at 21:25

## 2023-03-23 RX ADMIN — OXYCODONE HYDROCHLORIDE 10 MG: 5 TABLET ORAL at 13:26

## 2023-03-23 RX ADMIN — Medication 1 TABLET: at 08:30

## 2023-03-23 RX ADMIN — THERA TABS 1 TABLET: TAB at 08:29

## 2023-03-23 RX ADMIN — Medication 1 TABLET: at 12:27

## 2023-03-23 RX ADMIN — Medication 1 AMPULE: at 08:28

## 2023-03-23 RX ADMIN — ENOXAPARIN SODIUM 30 MG: 100 INJECTION SUBCUTANEOUS at 08:28

## 2023-03-23 RX ADMIN — SODIUM CHLORIDE, PRESERVATIVE FREE 10 ML: 5 INJECTION INTRAVENOUS at 17:39

## 2023-03-23 RX ADMIN — MESALAMINE 1600 MG: 400 CAPSULE, DELAYED RELEASE ORAL at 08:29

## 2023-03-23 RX ADMIN — SODIUM CHLORIDE, PRESERVATIVE FREE 10 ML: 5 INJECTION INTRAVENOUS at 21:09

## 2023-03-23 RX ADMIN — OXYCODONE HYDROCHLORIDE 5 MG: 5 TABLET ORAL at 08:30

## 2023-03-23 RX ADMIN — OXYCODONE HYDROCHLORIDE 5 MG: 5 TABLET ORAL at 17:38

## 2023-03-23 RX ADMIN — AZATHIOPRINE 100 MG: 50 TABLET ORAL at 08:29

## 2023-03-23 NOTE — PROGRESS NOTES
Comprehensive Nutrition Assessment    Type and Reason for Visit: Initial, Consult    Nutrition Recommendations/Plan:   Continue CCD/60 g CHO/meal for BG management. Please document % meals and supplements consumed in flowsheet I/O's under intake. Malnutrition Assessment:  Malnutrition Status:  No malnutrition (03/23/23 1111)      Nutrition Assessment:    3/23: Chart reviewed; med note for closed ankle fx, s/p repair. RD received nutritional consult per ONS. RD visited with pt at bedside, reports overall good po intake and selecting meals from menu. Pt seems to consume enough protein at meals. Pt has a hx of crohn's so limited on certain foods. Pt pleasantly declined ONs at this time; encouraged pt to reach back out should po declined and she express a need. Pt currently receiving a CCD for BG management. Last Weight Metric  Weight Loss Metrics 3/21/2023 3/13/2023 2/26/2023 3/28/2022 1/7/2020 7/2/2019 12/13/2018   Today's Wt 278 lb 3.5 oz 250 lb - - - - -   BMI 41.09 kg/m2 36.92 kg/m2 - 44.15 kg/m2 44.15 kg/m2 44.15 kg/m2 44.15 kg/m2      Nutrition Related Findings:    BM: none documented; Labs: BG -280; Meds: reviewed Wound Type: Surgical incision    Current Nutrition Intake & Therapies:        ADULT DIET Regular; 4 carb choices (60 gm/meal); please cook veg extra soft if pt orders    Anthropometric Measures:  Height: 5' 9\" (175.3 cm)  Ideal Body Weight (IBW): 145 lbs (66 kg)     Current Body Wt:  126.2 kg (278 lb 3.5 oz), 191.9 % IBW. Current BMI (kg/m2): 41.1                          BMI Category: Obese class 3 (BMI 40.0 or greater)    Estimated Daily Nutrient Needs:  Energy Requirements Based On: Formula  Weight Used for Energy Requirements: Current  Energy (kcal/day): 2300 (BMR x 1. 2AF)  Weight Used for Protein Requirements: Current  Protein (g/day): 126 (1.0 g/kg bw)  Method Used for Fluid Requirements: 1 ml/kcal  Fluid (ml/day): 2300 ml/day    Nutrition Diagnosis:   No nutrition diagnosis at this time     Nutrition Interventions:   Food and/or Nutrient Delivery: Continue current diet, Continue oral nutrition supplement  Nutrition Education/Counseling: No recommendations at this time  Coordination of Nutrition Care: Continue to monitor while inpatient       Goals:     Goals: PO intake 50% or greater, by next RD assessment       Nutrition Monitoring and Evaluation:   Behavioral-Environmental Outcomes: None identified  Food/Nutrient Intake Outcomes: Food and nutrient intake  Physical Signs/Symptoms Outcomes: Biochemical data, Skin, Weight    Discharge Planning:    Continue current diet    Yehuda Hendrix RD  Contact:

## 2023-03-23 NOTE — PROGRESS NOTES
Problem: Falls - Risk of  Goal: *Absence of Falls  Description: Document Tammie Gallegos Fall Risk and appropriate interventions in the flowsheet. Outcome: Progressing Towards Goal  Note: Fall Risk Interventions:  Mobility Interventions: Bed/chair exit alarm, Patient to call before getting OOB         Medication Interventions: Assess postural VS orthostatic hypotension, Bed/chair exit alarm, Patient to call before getting OOB    Elimination Interventions: Bed/chair exit alarm, Call light in reach, Patient to call for help with toileting needs    History of Falls Interventions: Bed/chair exit alarm, Investigate reason for fall         Problem: Pain  Goal: *Control of Pain  Outcome: Progressing Towards Goal     Problem: Pressure Injury - Risk of  Goal: *Prevention of pressure injury  Description: Document Sergio Scale and appropriate interventions in the flowsheet. Outcome: Progressing Towards Goal  Note: Pressure Injury Interventions:  Sensory Interventions: Assess changes in LOC, Discuss PT/OT consult with provider         Activity Interventions: Increase time out of bed, Pressure redistribution bed/mattress(bed type), PT/OT evaluation    Mobility Interventions: Float heels, HOB 30 degrees or less, Pressure redistribution bed/mattress (bed type), PT/OT evaluation    Nutrition Interventions: Document food/fluid/supplement intake                     Problem: Diabetes Self-Management  Goal: *Disease process and treatment process  Description: Define diabetes and identify own type of diabetes; list 3 options for treating diabetes. Outcome: Progressing Towards Goal  Goal: *Incorporating nutritional management into lifestyle  Description: Describe effect of type, amount and timing of food on blood glucose; list 3 methods for planning meals. Outcome: Progressing Towards Goal  Goal: *Incorporating physical activity into lifestyle  Description: State effect of exercise on blood glucose levels.   Outcome: Progressing Towards Goal  Goal: *Developing strategies to promote health/change behavior  Description: Define the ABC's of diabetes; identify appropriate screenings, schedule and personal plan for screenings. Outcome: Progressing Towards Goal  Goal: *Using medications safely  Description: State effect of diabetes medications on diabetes; name diabetes medication taking, action and side effects. Outcome: Progressing Towards Goal  Goal: *Monitoring blood glucose, interpreting and using results  Description: Identify recommended blood glucose targets  and personal targets. Outcome: Progressing Towards Goal  Goal: *Prevention, detection, treatment of acute complications  Description: List symptoms of hyper- and hypoglycemia; describe how to treat low blood sugar and actions for lowering  high blood glucose level. Outcome: Progressing Towards Goal  Goal: *Prevention, detection and treatment of chronic complications  Description: Define the natural course of diabetes and describe the relationship of blood glucose levels to long term complications of diabetes.   Outcome: Progressing Towards Goal  Goal: *Developing strategies to address psychosocial issues  Description: Describe feelings about living with diabetes; identify support needed and support network  Outcome: Progressing Towards Goal

## 2023-03-23 NOTE — PROGRESS NOTES
Problem: Mobility Impaired (Adult and Pediatric)  Goal: *Acute Goals and Plan of Care (Insert Text)  Description: FUNCTIONAL STATUS PRIOR TO ADMISSION: Patient was independent and active without use of DME.    HOME SUPPORT PRIOR TO ADMISSION: The patient lived alone with local family to provide assistance. Physical Therapy Goals  Initiated 3/22/2023    1. Patient will move from supine to sit and sit to supine  in bed with minimum assistance with bed in flat position within 4 days. 2. Patient will perform sit to stand with stand by assistance within 4 days. 3. Patient will ambulate with minimum assistance  assist for 15 feet with the least restrictive device within 4 days. 4. Patient will ascend/descend 14 stairs with 1 handrail(s) with modified independence within 4 days. 5. Patient will verbalize and demonstrate understanding of L LE NWB precautions per protocol within 4 days. Outcome: Progressing Towards Goal   PHYSICAL THERAPY TREATMENT  Patient: Jabari Rockwell (17 y.o. female)  Date: 3/23/2023  Diagnosis: Closed left ankle fracture [S82.892A] <principal problem not specified>  Procedure(s) (LRB):  OPEN REDUCTION INTERNAL FIXATION LEFT LATERAL MALLEOLUS, OPEN REDUCTION INTERNAL FIXATION SYNDESMOSIS AND LATERAL LIGAMENT RECONSTRUCTION(GENERAL WITH POPLITEAL AND ADDUCTOR BLOCK) (Left) 2 Days Post-Op  Precautions: Fall, NWB  Chart, physical therapy assessment, plan of care and goals were reviewed. ASSESSMENT  Patient continues with skilled PT services and is progressing towards goals. Patient is cooperative, motivated, and determined to restore her PLOF. She is sitting in recliner at start of PT treatment, finishing OT treatment. After brief rest, patient comes to stand from recliner with stand-by assist and ambulates 30 feet in room with rolling walker and contact guard assist for safety, NWB left LE.  Patient observes excellent awareness of safety, environment, and of NWB precautions throughout gait trial. She is able to perform static/dynamic activity NWB with 0-1 UE support and stand-by/contact guard assist for >/= 1 minute. She may be appropriate for stairs training in next treatment or two to practice for eventual return home (she has 3 large, then 11 more shallow, stairs to enter her apartment), but she will benefit from Rehab to maximize her strength, activity tolerance, and functional independence before returning to her home setting. Patient left reclined in chair with left LE elevated on 2 pillows, ice pack in place, and call bell in reach. Current Level of Function Impacting Discharge (mobility/balance): stand-by assist sit to/from stand, stand-by/contact guard assist dynamic standing at rolling walker, contact guard assist to ambulate 30 feet in room with rolling walker    Other factors to consider for discharge: lives alone, able to work from home/Rehab         PLAN :  Patient continues to benefit from skilled intervention to address the above impairments. Continue treatment per established plan of care. to address goals. 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     OBJECTIVE DATA SUMMARY:   Critical Behavior:  Neurologic State: Alert  Orientation Level: Appropriate for age  Cognition: Follows commands  Safety/Judgement: Awareness of environment  Functional Mobility Training:  Bed Mobility:      Patient starts/ends PT treatment from chair              Transfers:  Sit to Stand: Stand-by assistance  Stand to Sit: Stand-by assistance        Bed to Chair: Contact guard assistance;Assist x1;Adaptive equipment                    Balance:  Sitting: Intact; With support  Standing: Impaired; Without support  Standing - Static: Constant support;Good  Standing - Dynamic : Constant support; Fair  Ambulation/Gait Training:  Distance (ft): 30 Feet (ft)  Assistive Device: Gait belt;Walker, rolling  Ambulation - Level of Assistance: Contact guard assistance;Assist x1        Gait Abnormalities: Decreased step clearance     Left Side Weight Bearing: Non-weight bearing  Base of Support: Shift to right     Speed/Dominique: Slow  Step Length: Right shortened      Therapeutic Exercises:   Encouraged left toe-wiggles from reclined position; patient performs gentle bilateral LE AROM prior to mobility training  Pain Rating:  Patient does not report pain during this treatment, ice pack placed at medial distal LE post-activity    Activity Tolerance:   Fair and requires rest breaks    After treatment patient left in no apparent distress:   Sitting in chair, Heels elevated for pressure relief, and Call bell within reach    COMMUNICATION/COLLABORATION:   The patients plan of care was discussed with: Occupational therapist and Registered nurse.      Zach Pineda PT   Time Calculation: 25 mins

## 2023-03-23 NOTE — DIABETES MGMT
[address] [facility]    CLINICAL NURSE SPECIALIST CONSULT     Initial Presentation   Radha Squires is a 49 y.o. female admitted 3/21/2023 after experiencing a left leg fracture . LAB:Glucose 228. Creatine 0.78. GFR >60. A1c 9.2%  Date of Procedure: 3/21/2023      Pre-Op Diagnosis:   LEFT LATERAL MALLEOLUS FRACTURE  UNCONTROLLED TYPE II DIABETES WITH NEUROPATHY     Post-Op Diagnosis:   LEFT LATERAL MALLEOLUS FRACTURE  LATERAL LIGAMENT INSTABILITY  UNCONTROLLED TYPE II DIABETES WITH NEUROPATHY     Procedure(s):  OPEN REDUCTION INTERNAL FIXATION LEFT LATERAL MALLEOLUS,   OPEN REDUCTION INTERNAL FIXATION SYNDESMOSIS  LATERAL LIGAMENT RECONSTRUCTION     Surgeon(s):  Althea Loving MD     Surgical Assistant: Surg Asst-1: Octavio Dueñas     Anesthesia: General      Estimated Blood Loss (mL): less than 470      Complications: None     Specimens: * No specimens in log *      Implants:   Implant Name Type Inv. Item Serial No.  Lot No. LRB No. Used Action   SCREW BNE L20MM DIA3.5MM NONLOCKING FOR R3CON PLATING SYS - SN/A   SCREW BNE L20MM DIA3.5MM NONLOCKING FOR R3CON PLATING SYS N/A PARAGON 28_WD N/A Left 1 Implanted   11 hole left plate     N/A PARAGON 28_WD N/A Left 1 Implanted   SCREW BNE L12MM DIA3. 5MM R3CON BURAK PLT [place] PLATING SYS - SN/A   SCREW BNE L12MM DIA3. 5MM R3CON BURAK PLT [place] PLATING SYS N/A PARAGON 28_WD N/A Left 1 Implanted   SCREW BNE L14MM DIA3.5MM NONLOCKING FOR R3CON PLATING SYS - SN/A   SCREW BNE L14MM DIA3.5MM NONLOCKING FOR R3CON PLATING SYS N/A PARAGON 28_WD N/A Left 3 Implanted   SCREW BNE L10MM DIA3. 5MM BURAK FOR R3CON PLATING SYS [place] - SN/A   SCREW BNE L10MM DIA3. 5MM BURAK FOR R3CON PLATING SYS [place] N/A PARAGON 28_WD N/A Left 1 Implanted   3.5 x 52mm non-locking screw     N/A PARAGON 28_WD N/A Left 1 Implanted   SCREW BNE L50MM [id number] NONLOCKING [place] PLATING SYS R3CON - SN/A   SCREW BNE L50MM [id number] NONLOCKING [place] PLATING SYS R3CON N/A PARAGON 28_WD N/A Left 1 Implanted   3.5 x 58mm non-locking screw     N/A PARAGON 28_WD N/A Left 1 Implanted   DX FIBERTAK SUTURE ANCHOR #2 MTS WITH NDL - [id number]   DX FIBERTAK SUTURE ANCHOR #2 MTS WITH NDL [id number] [id number] Left 1 Implanted   DX FIBERTAK SUTURE ANCHOR #2 MTS WITH NDL - [id number]   DX FIBERTAK SUTURE ANCHOR #2 MTS WITH NDL [id number] [address] [id number] Left 1 Implanted   INTERNAL BRACE IMPLANT SYSTEM LIGAMENT AUGMENTATION REPAIR, BIOCOMPOSITE with JUMPSTART DRESSING     [id number] ARTHREX [id number] Left 1 Implanted        HX:   Past Medical History:   Diagnosis Date    Anemia     Autoimmune disease ([address]     crohn's disease    Closed left ankle fracture 3/21/2023    Crohn disease ([facility]     Diabetes ([facility]     GERD (gastroesophageal reflux disease)     History of colonoscopy with polypectomy 03/12/2020    Dr. Kota Huitron: Surveillance for UC. Polypectomy (1cm in sigmoid colon)--repeat 3yrs. Hypertension         INITIAL DX:   Closed left ankle fracture [K85.696L]     Current Treatment     TX: azathioprine. Clot prevention. Insulin. Mesalamine. Metformin. Protonix. Consulted by Provider for advanced diabetes nursing assessment and care for:   [] Transitioning off Drumore Poet   [x] Inpatient management strategy  [x] Home management assessment  [] Survival skill education    Hospital Course   Clinical progress has been uncomplicated . Diabetes History   The patient reports a hx of Type 2 diabetes. She reportedtly takes 20 units Lantus and Metformin. The patient was taken off [place] in February 2023 after an episode of DKA. The patient resides alone and reports a strong support system of friends and family.      Diabetes-related Medical History    Neurological complications  Peripheral neuropathy  Macrovascular disease  Cerebral vascular accident      Diabetes Medication History  Key Antihyperglycemic Medications               insulin glargine (LANTUS) 100 unit/mL injection (Taking) by SubCUTAneous route nightly. metFORMIN ER (GLUCOPHAGE XR) 500 mg tablet (Taking) TAKE 2 TABLETS BY MOUTH TWICE DAILY WITH MEALS             Diabetes self-management practices:   Eating pattern   [x] Eating a carbohydrate-controlled mealplan  ( Improving)    Monitoring pattern   [x] Testing BGs sufficiently to inform self-management adjustments    Taking medications pattern  [x] Consistent administration  [x] Affordable    Overall evaluation:    [x] Not achieving A1c target with drug therapy & self-care practices    Subjective   I appreciate you trying help me with my medication     Objective   Physical exam  General Obese female in no acute distress.  Conversant and cooperative  Neuro  Alert, oriented   Vital Signs Visit Vitals  BP (!) 140/88   Pulse (!) 102   Temp 98.4 °F (36.9 °C)   Resp 19   Ht 5' 9\" (1.753 m)   Wt 126.2 kg (278 lb 3.5 oz)   SpO2 97%   BMI 41.09 kg/m²         Laboratory  Recent Labs     03/22/23  0433 03/21/23  1033   * 228*   AGAP 5 5   WBC  --  4.5   CREA 0.58 0.78         Factors impacting BG management  Factor Dose Comments   Nutrition:  Standard meals     60 grams/meal      Pain PRN oxycodone  PRN Dilaudid    Infection Post-op Ancef Completed   Other:   Kidney function       Normal          Blood glucose pattern      Significant diabetes-related events over the past 24-72 hours  3/22/23 BG's  ranging 198-237 for past 24 hours    Assessment and Plan   Nursing Diagnosis Risk for unstable blood glucose pattern   Nursing Intervention Domain 0084 Decision-making Support   Nursing Interventions Examined current inpatient diabetes/blood glucose control   Explored factors facilitating and impeding inpatient management  Explored corrective strategies with patient and responsible inpatient provider   Informed patient of rational for insulin strategy while hospitalized     Nursing Diagnosis 09491 Ineffective Health Management   Nursing Intervention Domain 8514 Decision-makingSupport   Nursing Interventions Identified diabetes self-management practices impeding diabetes control  Discussed diabetes survival skills related to  Healthy Plate eating plan; given handouts  Role of physical activity in improving insulin sensitivity and action  Procedure for blood glucose monitoring & options for low-cost products available from [facility]   Medications plan at discharge     Evaluation   This 49year old female with Type 2 diabetes did not achieve Bg control prior to admission as evidenced by an A1c of 9.2%. The patient is using Lantus 20 units and Metformin at home. BG's have been elevated at home. The A1c demonstrates that the patient may require an increase in basal insulin at discharge. The patient has been ordered her home dose of 20 units lantus to start this evening. I suspect she may require more. I will review Bg trends and make changes/ recommendations as needed. BG's are better but still elevated. The basal dose has been increased to 30 units Lantus daily and glipizide has been added as well. The changes were discussed with the patient and she is amenable.        Order/Plan   Use 30 units Lantus 30 units to start tonight  Continue Metformin 1000 twice daily  Add glipizide 5 mg twice daily        CARLO Enriquez  Diabetes Clinical Nurse Specialist  [facility]  Access via [facility]

## 2023-03-23 NOTE — PROGRESS NOTES
Ortho Progress Note:    Patient seen and examined by me evening of 3/22/23. Working on pain control. Working with PT. No chest pain, SOB. No nausea, vomiting. Some issues with urinary retention. Splint c/d/I  Flexes/extends all toes  Toes WWP    49 y.o. female history of Crohns and uncontrolled type 2 diabetes with neuropathy s/p ORIF left lateral malleolus and syndesmosis and lateral ligament reconstruction with internal brace on 3/21/23. I reviewed the intraoperative findings with the patient of significant ankle instability involving the lateral ligaments with roughly 50% anterior translation/subluxation of the talus with anterior drawer testing and instability with varus stress requiring lateral ligament reconstruction. She reported that she has a long standing history of chronic ankle instability and multiple recurrent ankle sprains for many years. Given the gross instability requiring ligament reconstruction, history of Crohns, and type 2 diabetes with neuropathy, will plan for NWB LLE for approximately 12 weeks. We also discussed my concerns with the gross ankle instability noted intraoperatively, that when we do allow her to start weightbearing, there is a risk she could continue to have issues with instability and subluxation of the ankle joint that may require an ankle vs TTC fusion. My hope is that we can mitigate this risk with prolonged NWB to allow things to heal.    Lovenox for DVT ppx due to Crohns and inability to tolerate po anticoagulation  Diabetes management consulted for uncontrolled type 2 diabetes with neuropathy. Continue insulin and metformin  Continue mesalamine for Crohns  NWB LLE x 12 weeks minimum  PT  Will likely need SNF upon discharge. Lives alone in in 2nd story apartment.

## 2023-03-23 NOTE — PROGRESS NOTES
Transition of Care Plan:  RUR: 7%  Disposition: SNF  If SNF or IPR: Date FOC offered: 3/22  Date [address] received: 3/23  Date authorization started with reference number:  Date authorization received and expires:  Accepting facility:  Follow up appointments: ortho  DME needed: n/a  Transportation at Discharge: BLS  [place] or means to access home:  n/a      IM Medicare Letter: n/a  Caregiver Contact: sister Henny Arreaga [phone]  Discharge Caregiver contacted prior to discharge? yes  Care Conference needed?: no      Pt requested referrals be sent to [facility], [address] [place] [address], and [place]. Referrals sent and pending. Pt will require insurance auth.      [id number] [address] [place] [address] 39, [id number] [facility], [address]

## 2023-03-23 NOTE — PROGRESS NOTES
End of Shift Note    Bedside shift change report given to  (oncoming nurse) by Nupur York RN (offgoing nurse). Report included the following information SBAR, Kardex, Intake/Output, MAR, and Recent Results    Shift worked:  night     Shift summary and any significant changes:     Alert and oriented; up with 1 assist to bedside commode; voiding; non-weight bearing on left leg     Concerns for physician to address:       Zone phone for oncoming shift:          Activity:  Activity Level: Up with Assistance  Number times ambulated in hallways past shift: 0  Number of times OOB to chair past shift: 0    Cardiac:   Cardiac Monitoring: Yes      Cardiac Rhythm: Sinus Rhythm    Access:  Current line(s): PIV     Genitourinary:   Urinary status: voiding    Respiratory:   O2 Device: None (Room air)  Chronic home O2 use?: NO  Incentive spirometer at bedside: YES       GI:     Current diet:  ADULT DIET Regular; 4 carb choices (60 gm/meal)  Passing flatus: YES  Tolerating current diet: YES       Pain Management:   Patient states pain is manageable on current regimen: YES    Skin:  Sergio Score: 20  Interventions: float heels and PT/OT consult    Patient Safety:  Fall Score:  Total Score: 4  Interventions: bed/chair alarm, assistive device (walker, cane, etc), gripper socks, pt to call before getting OOB, and stay with me (per policy)  High Fall Risk: Yes    Length of Stay:  Expected LOS: 2d 16h  Actual LOS: 2      Nupur York RN

## 2023-03-23 NOTE — PROGRESS NOTES
Problem: Self Care Deficits Care Plan (Adult)  Goal: *Acute Goals and Plan of Care (Insert Text)  Description: FUNCTIONAL STATUS PRIOR TO ADMISSION: Patient was independent and active without use of DME.     HOME SUPPORT: The patient lived alone with sister to provide assistance. Occupational Therapy Goals  Initiated 3/22/2023  1. Patient will perform grooming with independence within 7 day(s). 2.  Patient will perform upper body dressing with independence within 7 day(s). 3.  Patient will perform lower body dressing with independence within 7 day(s). 4.  Patient will perform toilet transfers with modified independence within 7 day(s). 5.  Patient will perform all aspects of toileting with modified independence within 7 day(s). 6.  Patient will utilize energy conservation techniques during functional activities with verbal cues within 7 day(s). Outcome: Progressing Towards Goal     OCCUPATIONAL THERAPY TREATMENT  Patient: Florina Eli (72 y.o. female)  Date: 3/23/2023  Diagnosis: Closed left ankle fracture [S82.892A] <principal problem not specified>  Procedure(s) (LRB):  OPEN REDUCTION INTERNAL FIXATION LEFT LATERAL MALLEOLUS, OPEN REDUCTION INTERNAL FIXATION SYNDESMOSIS AND LATERAL LIGAMENT RECONSTRUCTION(GENERAL WITH POPLITEAL AND ADDUCTOR BLOCK) (Left) 2 Days Post-Op  Precautions: Fall, NWB  Chart, occupational therapy assessment, plan of care, and goals were reviewed. ASSESSMENT  Patient continues with skilled OT services and is progressing towards goals. The patient presents with NWB on L LE due to ankle fracture requiring ORIF, general weakness, decreased endurance, and decreased balance. Pt is performing below her independent baseline, but has improved to now completing ADLs at an independent to SBA level and functional mobility at an independent to CGA level using RW. Pt is able to complete dressing ADLs sitting EOB, following compensatory training to don LB clothing.  Pt demonstrated good carryover for previous training related to hand placement during transfers and compensatory LB ADL training. Pt will continue benefit from acute OT and would benefit from SNF at discharge so OT can continue to address ADLs, transfers, and home safety. Current Level of Function Impacting Discharge (ADLs): Independent to SBA    Other factors to consider for discharge: 11 steps to get to apartment         PLAN :  Patient continues to benefit from skilled intervention to address the above impairments. Continue treatment per established plan of care to address goals. Recommendation for discharge: (in order for the patient to meet his/her long term goals)  Therapy up to 5 days/week in SNF setting    This discharge recommendation:  Has been made in collaboration with the attending provider and/or case management    IF patient discharges home will need the following DME: transfer bench and walker: rolling       SUBJECTIVE:   Patient stated I was just about to eat lunch.     OBJECTIVE DATA SUMMARY:   Cognitive/Behavioral Status:  Neurologic State: Alert  Orientation Level: Oriented X4  Cognition: Follows commands      Functional Mobility and Transfers for ADLs:  Bed Mobility:  Rolling: Supervision  Supine to Sit: Supervision  Scooting: Independent    Transfers:  Sit to Stand: Stand-by assistance  Functional Transfers  Bathroom Mobility: Contact guard assistance  Toilet Transfer : Contact guard assistance  Adaptive Equipment: Walker (comment)  Bed to Chair: Contact guard assistance    Balance:  Sitting: Intact  Standing: Impaired; Without support  Standing - Static: Good;Constant support  Standing - Dynamic : Fair;Constant support    ADL Intervention:    Grooming  Position Performed: Standing  Washing Face: Set-up  Washing Hands: Set-up  Brushing/Combing Hair: Set-up    Upper Body Dressing Assistance  Pullover Shirt: Set-up    Lower Body Dressing Assistance  Pants With Elastic Waist: Stand-by assistance  Socks: Stand-by assistance  Shoes with Cloth Laces: Stand-by assistance  Position Performed: Seated in chair;Standing    Toileting  Toileting Assistance: Modified independent  Bladder Hygiene: Independent  Clothing Management: Stand-by assistance  Adaptive Equipment: Elevated seat;Grab bars      Pain:  No complaint of pain    Activity Tolerance:   Good and observed SOB with activity    After treatment patient left in no apparent distress:   Sitting in chair, Call bell within reach, and Bed / chair alarm activated    COMMUNICATION/COLLABORATION:   The patients plan of care was discussed with: Physical therapist and Registered nurse.      Presley Menezes OT  Time Calculation: 21 mins

## 2023-03-23 NOTE — PROGRESS NOTES
Ortho / Neurosurgery NP Note    POD# 1  s/p OPEN REDUCTION INTERNAL FIXATION LEFT LATERAL MALLEOLUS, OPEN REDUCTION INTERNAL FIXATION SYNDESMOSIS AND LATERAL LIGAMENT RECONSTRUCTION(GENERAL WITH POPLITEAL AND ADDUCTOR BLOCK)   Pt seen with no visitor present. Pt resting in bed. Awake and alert, in NAD. Reports postop pain well controlled with oxycodone 10 mg   Denies numbness or tingling in LLE  Soreness in renetta shoulders from using UE for transfer, maintaining NWB of LLE  Tolerating regular diet. No nausea. Hx Chron's, bowel regimen d/c    VSS Afebrile. Room air.      Visit Vitals  BP (!) 141/77   Pulse 99   Temp 97.9 °F (36.6 °C)   Resp 16   Ht 5' 9\" (1.753 m)   Wt 126.2 kg (278 lb 3.5 oz)   LMP 01/20/2023 (Approximate)   SpO2 93%   BMI 41.09 kg/m²       Voiding status: voiding   Output (mL)  Urine Voided: 350 ml (03/23/23 0455)  Unmeasurable Output  Urine Occurrence(s): 1 (03/21/23 1420)  Straight Cath  Straight Cath: Nurse performed cath;Sterile technique used (03/22/23 0506)  Number of Attempts: 1 (03/22/23 0506)  Catheter Size: 16 FR (03/22/23 0506)  Time Catheter Inserted: 1455 (03/22/23 0506)  Time Catheter Removed: 4928 (03/22/23 0506)  Urine: 800 mL (03/22/23 0506)      Labs    Lab Results   Component Value Date/Time    HGB 10.7 (L) 03/23/2023 04:08 AM      No results found for: INR, INREXT, INREXT   Lab Results   Component Value Date/Time    Sodium 136 03/22/2023 04:33 AM    Potassium 3.9 03/22/2023 04:33 AM    Chloride 104 03/22/2023 04:33 AM    CO2 27 03/22/2023 04:33 AM    Glucose 237 (H) 03/22/2023 04:33 AM    BUN 13 03/22/2023 04:33 AM    Creatinine 0.58 03/22/2023 04:33 AM    Calcium 8.9 03/22/2023 04:33 AM     Recent Glucose Results:   Lab Results   Component Value Date/Time    GLUCPOC 199 (H) 03/23/2023 06:37 AM    GLUCPOC 239 (H) 03/22/2023 09:33 PM    GLUCPOC 280 (H) 03/22/2023 04:12 PM           Body mass index is 41.09 kg/m². : A BMI > 30 is classified as obesity and > 40 is classified as morbid obesity. Dressing c. d. I - LLE elevated   Calves soft and supple; No pain with passive stretch  Sensation and motor intact. +PF/DF/EHL intact. LLE skin warm/pink, +cap refill, unable to palpate pedal pulse due to cast  1+ expected swelling today   SCDs for mechanical DVT proph while in bed     PLAN:  1) PT/OT - NWB   2) Lovenox 30 mg BID for DVT Prophylaxis for 6 weeks  3) Pain control - scheduled tylenol, and prn  oxycodone  5-10 mg q4hrs. 4) Uncontrolled DM2 -  A1C 9.2 (3/23). DTC following. Continue Lantus and SSI, 60 gram diabetic diet. 5) Discharge planning - Lives alone in second floor apartment. Medically stable for discharge. SNF placement, will need insurance auth, likely here through the weekend.      Kim Astudillo NP

## 2023-03-24 LAB
GLUCOSE BLD STRIP.AUTO-MCNC: 135 MG/DL (ref 65–117)
GLUCOSE BLD STRIP.AUTO-MCNC: 174 MG/DL (ref 65–117)
GLUCOSE BLD STRIP.AUTO-MCNC: 188 MG/DL (ref 65–117)
GLUCOSE BLD STRIP.AUTO-MCNC: 193 MG/DL (ref 65–117)
SERVICE CMNT-IMP: ABNORMAL

## 2023-03-24 PROCEDURE — 97530 THERAPEUTIC ACTIVITIES: CPT

## 2023-03-24 PROCEDURE — 74011000250 HC RX REV CODE- 250: Performed by: STUDENT IN AN ORGANIZED HEALTH CARE EDUCATION/TRAINING PROGRAM

## 2023-03-24 PROCEDURE — 74011250636 HC RX REV CODE- 250/636: Performed by: STUDENT IN AN ORGANIZED HEALTH CARE EDUCATION/TRAINING PROGRAM

## 2023-03-24 PROCEDURE — 82962 GLUCOSE BLOOD TEST: CPT

## 2023-03-24 PROCEDURE — 74011250637 HC RX REV CODE- 250/637: Performed by: STUDENT IN AN ORGANIZED HEALTH CARE EDUCATION/TRAINING PROGRAM

## 2023-03-24 PROCEDURE — 97530 THERAPEUTIC ACTIVITIES: CPT | Performed by: OCCUPATIONAL THERAPIST

## 2023-03-24 PROCEDURE — 97116 GAIT TRAINING THERAPY: CPT

## 2023-03-24 PROCEDURE — 97535 SELF CARE MNGMENT TRAINING: CPT

## 2023-03-24 PROCEDURE — 65270000029 HC RM PRIVATE

## 2023-03-24 PROCEDURE — 94760 N-INVAS EAR/PLS OXIMETRY 1: CPT

## 2023-03-24 PROCEDURE — 74011250637 HC RX REV CODE- 250/637

## 2023-03-24 PROCEDURE — 74011636637 HC RX REV CODE- 636/637

## 2023-03-24 RX ADMIN — SODIUM CHLORIDE, PRESERVATIVE FREE 10 ML: 5 INJECTION INTRAVENOUS at 06:42

## 2023-03-24 RX ADMIN — Medication 2 UNITS: at 08:26

## 2023-03-24 RX ADMIN — METFORMIN HYDROCHLORIDE 1000 MG: 500 TABLET, EXTENDED RELEASE ORAL at 17:09

## 2023-03-24 RX ADMIN — PANTOPRAZOLE SODIUM 40 MG: 40 TABLET, DELAYED RELEASE ORAL at 08:27

## 2023-03-24 RX ADMIN — Medication 1 TABLET: at 08:27

## 2023-03-24 RX ADMIN — Medication 1 AMPULE: at 08:28

## 2023-03-24 RX ADMIN — OXYCODONE HYDROCHLORIDE 5 MG: 5 TABLET ORAL at 08:27

## 2023-03-24 RX ADMIN — Medication 1 TABLET: at 11:46

## 2023-03-24 RX ADMIN — GLIPIZIDE 5 MG: 5 TABLET ORAL at 17:10

## 2023-03-24 RX ADMIN — ENOXAPARIN SODIUM 30 MG: 100 INJECTION SUBCUTANEOUS at 08:26

## 2023-03-24 RX ADMIN — OXYCODONE HYDROCHLORIDE 10 MG: 5 TABLET ORAL at 20:10

## 2023-03-24 RX ADMIN — SODIUM CHLORIDE, PRESERVATIVE FREE 10 ML: 5 INJECTION INTRAVENOUS at 14:10

## 2023-03-24 RX ADMIN — MESALAMINE 1600 MG: 400 CAPSULE, DELAYED RELEASE ORAL at 08:26

## 2023-03-24 RX ADMIN — Medication 1 AMPULE: at 21:54

## 2023-03-24 RX ADMIN — INSULIN GLARGINE 30 UNITS: 100 INJECTION, SOLUTION SUBCUTANEOUS at 21:53

## 2023-03-24 RX ADMIN — AZATHIOPRINE 100 MG: 50 TABLET ORAL at 08:27

## 2023-03-24 RX ADMIN — THERA TABS 1 TABLET: TAB at 08:27

## 2023-03-24 RX ADMIN — METFORMIN HYDROCHLORIDE 1000 MG: 500 TABLET, EXTENDED RELEASE ORAL at 08:26

## 2023-03-24 RX ADMIN — ENOXAPARIN SODIUM 30 MG: 100 INJECTION SUBCUTANEOUS at 20:10

## 2023-03-24 RX ADMIN — Medication 1 TABLET: at 17:10

## 2023-03-24 RX ADMIN — Medication 2 UNITS: at 17:10

## 2023-03-24 RX ADMIN — SODIUM CHLORIDE, PRESERVATIVE FREE 10 ML: 5 INJECTION INTRAVENOUS at 21:54

## 2023-03-24 RX ADMIN — GLIPIZIDE 5 MG: 5 TABLET ORAL at 08:27

## 2023-03-24 NOTE — PROGRESS NOTES
Problem: Self Care Deficits Care Plan (Adult)  Goal: *Acute Goals and Plan of Care (Insert Text)  Description: FUNCTIONAL STATUS PRIOR TO ADMISSION: Patient was independent and active without use of DME.     HOME SUPPORT: The patient lived alone with sister to provide assistance. Occupational Therapy Goals  Initiated 3/22/2023  1. Patient will perform grooming with independence within 7 day(s). 2.  Patient will perform upper body dressing with independence within 7 day(s). 3.  Patient will perform lower body dressing with independence within 7 day(s). 4.  Patient will perform toilet transfers with modified independence within 7 day(s). 5.  Patient will perform all aspects of toileting with modified independence within 7 day(s). 6.  Patient will utilize energy conservation techniques during functional activities with verbal cues within 7 day(s). Outcome: Progressing Towards Goal     OCCUPATIONAL THERAPY TREATMENT  Patient: Stephany Langley (40 y.o. female)  Date: 3/24/2023  Diagnosis: Closed left ankle fracture [S82.892A] <principal problem not specified>  Procedure(s) (LRB):  OPEN REDUCTION INTERNAL FIXATION LEFT LATERAL MALLEOLUS, OPEN REDUCTION INTERNAL FIXATION SYNDESMOSIS AND LATERAL LIGAMENT RECONSTRUCTION(GENERAL WITH POPLITEAL AND ADDUCTOR BLOCK) (Left) 3 Days Post-Op  Precautions: Fall, NWB  Chart, occupational therapy assessment, plan of care, and goals were reviewed. ASSESSMENT  Patient continues with skilled OT services and is progressing towards goals. Pt presents with NWB on L LE due to ankle fracture requiring ORIF, general weakness, decreased endurance, and decreased balance. Pt is performing below her independent baseline, but has improved to now completing ADLs at a setup to SBA level and functional mobility at an independent to SBA level using RW. Pt is able to complete dressing ADLs sitting EOB, following compensatory training to don LB clothing.  Pt demonstrated continued good carryover for previous training related to hand placement during transfers. Pt's activity tolerance is improving and demonstrating improved transfers. Pt will continue benefit from acute OT and would benefit from SNF at discharge so OT can continue to address ADLs, transfers, and home safety. Current Level of Function Impacting Discharge (ADLs): set-up to SBA    Other factors to consider for discharge: NWB L LE         PLAN :  Patient continues to benefit from skilled intervention to address the above impairments. Continue treatment per established plan of care to address goals. Recommendation for discharge: (in order for the patient to meet his/her long term goals)  Therapy up to 5 days/week in SNF setting    This discharge recommendation:  Has been made in collaboration with the attending provider and/or case management    IF patient discharges home will need the following DME: transfer bench       SUBJECTIVE:   Patient stated I might be here a few more days.     OBJECTIVE DATA SUMMARY:   Cognitive/Behavioral Status:  Neurologic State: Alert  Orientation Level: Oriented X4  Cognition: Follows commands; Appropriate decision making; Appropriate for age attention/concentration; Appropriate safety awareness    Functional Mobility and Transfers for ADLs:  Bed Mobility:  Rolling: Independent  Supine to Sit: Independent  Scooting: Independent    Transfers:  Sit to Stand: Stand-by assistance  Functional Transfers  Bathroom Mobility: Stand-by assistance  Toilet Transfer : Stand-by assistance  Bed to Chair: Stand-by assistance    Balance:  Sitting: Intact  Standing: Impaired  Standing - Static: Good;Constant support  Standing - Dynamic : Fair;Constant support    ADL Intervention:  Grooming  Position Performed: Standing  Washing Face: Set-up  Washing Hands: Set-up  Brushing/Combing Hair: Set-up    Upper Body Dressing Assistance  Pullover Shirt: Set-up    Lower Body Dressing Assistance  Underpants: Stand-by assistance  Pants With Elastic Waist: Stand-by assistance  Socks: Stand-by assistance  Leg Crossed Method Used: Yes  Position Performed: Seated in chair;Standing    Toileting  Toileting Assistance: Modified independent  Bladder Hygiene: Independent  Clothing Management: Stand-by assistance  Adaptive Equipment: Elevated seat;Grab bars    Pain:  No complaint of pain    Activity Tolerance:   Good and observed SOB with activity    After treatment patient left in no apparent distress:   Sitting in chair, Call bell within reach, and Bed / chair alarm activated    COMMUNICATION/COLLABORATION:   The patients plan of care was discussed with: Registered nurse.      Jalyn Robledo OT  Time Calculation: 23 mins

## 2023-03-24 NOTE — PROGRESS NOTES
Problem: Mobility Impaired (Adult and Pediatric)  Goal: *Acute Goals and Plan of Care (Insert Text)  Description: FUNCTIONAL STATUS PRIOR TO ADMISSION: Patient was independent and active without use of DME.    HOME SUPPORT PRIOR TO ADMISSION: The patient lived alone with local family to provide assistance. Physical Therapy Goals  Initiated 3/22/2023    1. Patient will move from supine to sit and sit to supine  in bed with minimum assistance with bed in flat position within 4 days. 2. Patient will perform sit to stand with stand by assistance within 4 days. 3. Patient will ambulate with minimum assistance  assist for 15 feet with the least restrictive device within 4 days. 4. Patient will ascend/descend 14 stairs with 1 handrail(s) with modified independence within 4 days. 5. Patient will verbalize and demonstrate understanding of L LE NWB precautions per protocol within 4 days. Outcome: Progressing Towards Goal   PHYSICAL THERAPY TREATMENT  Patient: Brian Barnes (16 y.o. female)  Date: 3/24/2023  Diagnosis: Closed left ankle fracture [S82.892A] <principal problem not specified>  Procedure(s) (LRB):  OPEN REDUCTION INTERNAL FIXATION LEFT LATERAL MALLEOLUS, OPEN REDUCTION INTERNAL FIXATION SYNDESMOSIS AND LATERAL LIGAMENT RECONSTRUCTION(GENERAL WITH POPLITEAL AND ADDUCTOR BLOCK) (Left) 3 Days Post-Op  Precautions: Fall, NWB  Chart, physical therapy assessment, plan of care and goals were reviewed. ASSESSMENT  Patient continues with skilled PT services and is progressing towards goals. Pt presents with decreased strength and endurance. Pt performed sit to stand transfer at [facility]. Pt ambulated 30ft and 15ft with RW at [facility] with additional time with hop to gait. Pt requiring a few standing rest breaks due to fatigue. Pt needing cueing to turn all the way around before sitting. Pt with improved stability but still very fatigued needing rest breaks with mobility.      Current Level of Function Impacting Discharge (mobility/balance): ambulation at [facility], sit to stand transfer at [facility]     Other factors to consider for discharge: decreased strength, decreased endurance, NWB, lives in 2nd floor apt         PLAN :  Patient continues to benefit from skilled intervention to address the above impairments. Continue treatment per established plan of care. to address goals. Recommendation for discharge: (in order for the patient to meet his/her long term goals)  Therapy up to 5 days/week in SNF setting    This discharge recommendation:  Has been made in collaboration with the attending provider and/or case management    IF patient discharges home will need the following DME: rolling walker and wheelchair       SUBJECTIVE:   Patient stated  My arms just get so tired .     OBJECTIVE DATA SUMMARY:   Critical Behavior:  Neurologic State: Alert  Orientation Level: Oriented X4  Cognition: Follows commands, Appropriate decision making, Appropriate for age attention/concentration, Appropriate safety awareness  Safety/Judgement: Awareness of environment  Functional Mobility Training:  Bed Mobility:  Rolling: Independent  Supine to Sit: Independent     Scooting: Independent        Transfers:  Sit to Stand: Contact guard assistance;Stand-by assistance  Stand to Sit: Stand-by assistance        Bed to Chair: Stand-by assistance                    Balance:  Sitting: Intact  Standing: Impaired  Standing - Static: Good;Constant support  Standing - Dynamic : Poor;Fair;Constant support  Ambulation/Gait Training:  Distance (ft): 45 Feet (ft) (30ft and 15ft)  Assistive Device: Gait belt;Walker, rolling  Ambulation - Level of Assistance: Contact guard assistance; Additional time        Gait Abnormalities: Antalgic (hop to gait)        Base of Support: Shift to right     Speed/Dominique: Slow;Shuffled  Step Length: Left shortened     Pain Rating:  Pt reported increased pain when     Activity Tolerance:   Fair and requires rest breaks    After treatment patient left in no apparent distress:   Supine in bed, Call bell within reach, and Caregiver / family present    COMMUNICATION/COLLABORATION:   The patients plan of care was discussed with: Registered nurse.      Minor Reap, PTA   Time Calculation: 11 mins

## 2023-03-24 NOTE — PROGRESS NOTES
End of Shift Note    Bedside shift change report given to  Urbano FORD(oncoming nurse) by Jasmin Pascual RN (offgoing nurse). Report included the following information SBAR, Kardex, OR Summary, Procedure Summary, Intake/Output, and MAR    Shift worked:  7-7     Shift summary and any significant changes:          Concerns for physician to address:  [address] phone for oncoming shift:   [phone]       Activity:  Activity Level: Up with Assistance  Number times ambulated in hallways past shift: 0  Number of times OOB to chair past shift: 0    Cardiac:   Cardiac Monitoring: No      Cardiac Rhythm: Sinus Rhythm    Access:  Current line(s): PIV     Genitourinary:   Urinary status: voiding    Respiratory:   O2 Device: None (Room air)  Chronic home O2 use?: NO  Incentive spirometer at bedside: YES       GI:     Current diet:  ADULT DIET Regular; 4 carb choices (60 gm/meal); please cook veg extra soft if pt orders  Passing flatus: YES  Tolerating current diet: YES       Pain Management:   Patient states pain is manageable on current regimen: YES    Skin:  Sergio Score: 19  Interventions: float heels, increase time out of bed, PT/OT consult, limit briefs, and nutritional support     Patient Safety:  Fall Score:  Total Score: 4  Interventions: assistive device (walker, cane, etc), gripper socks, pt to call before getting OOB, stay with me (per policy), and gait belt  High Fall Risk: Yes    Length of Stay:  Expected LOS: 2d 16h  Actual LOS: 3      Jasmin Pascual RN

## 2023-03-24 NOTE — PROGRESS NOTES
Problem: Falls - Risk of  Goal: *Absence of Falls  Description: Document Danell Holter Fall Risk and appropriate interventions in the flowsheet.   Outcome: Progressing Towards Goal  Note: Fall Risk Interventions:  Mobility Interventions: Utilize walker, cane, or other assistive device, PT Consult for assist device competence, PT Consult for mobility concerns, Patient to call before getting OOB         Medication Interventions: Evaluate medications/consider consulting pharmacy, Patient to call before getting OOB, Teach patient to arise slowly, Utilize gait belt for transfers/ambulation    Elimination Interventions: Elevated toilet seat, Call light in reach, Bed/chair exit alarm    History of Falls Interventions: Bed/chair exit alarm, Door open when patient unattended         Problem: Patient Education: Go to Patient Education Activity  Goal: Patient/Family Education  Outcome: Progressing Towards Goal     Problem: Pain  Goal: *Control of Pain  Outcome: Progressing Towards Goal

## 2023-03-24 NOTE — PROGRESS NOTES
End of Shift Note    Bedside shift change report given to  Jerrica Laura RN(oncoming nurse) by Deborah Kim RN (offgoing nurse). Report included the following information SBAR, Kardex, OR Summary, Procedure Summary, Intake/Output, and MAR    Shift worked:  7p -7a     Shift summary and any significant changes:     Ambulates with walker and 1 assist to BR and NWB to left leg  Voiding        Concerns for physician to address:  DC plan       [employer] phone for oncoming shift:          Activity:  Activity Level: Up with Assistance  Number times ambulated in hallways past shift: 0  Number of times OOB to chair past shift: 0    Cardiac:   Cardiac Monitoring: No      Cardiac Rhythm: Sinus Rhythm    Access:  Current line(s): PIV     Genitourinary:   Urinary status: voiding    Respiratory:   O2 Device: None (Room air)  Chronic home O2 use?: NO  Incentive spirometer at bedside: YES       GI:     Current diet:  ADULT DIET Regular; 4 carb choices (60 gm/meal); please cook veg extra soft if pt orders  Passing flatus: YES  Tolerating current diet: YES       Pain Management:   Patient states pain is manageable on current regimen: YES    Skin:  Sergio Score: 19  Interventions: float heels, increase time out of bed, PT/OT consult, limit briefs, and nutritional support     Patient Safety:  Fall Score:  Total Score: 4  Interventions: assistive device (walker, cane, etc), gripper socks, pt to call before getting OOB, stay with me (per policy), and gait belt  High Fall Risk: Yes    Length of Stay:  Expected LOS: 2d 16h  Actual LOS: Gideon Bella RN

## 2023-03-24 NOTE — PROGRESS NOTES
Spiritual Care Assessment/Progress Note  [facility]      NAME: Wendy Stovall      MRN: [id number]  AGE: 49 y.o.  SEX: female  Adventism Affiliation: Other   Language: English     3/24/2023     Total Time (in minutes): 12     Spiritual Assessment begun in [facility] through conversation with:         [x]Patient        [] Family    [] Friend(s)        Reason for Consult: Initial/Spiritual assessment, patient floor     Spiritual beliefs: (Please include comment if needed)     [x] Identifies with a jeffery tradition: Attends [employer]        [] Supported by a jeffery community:            [] Claims no spiritual orientation:           [] Seeking spiritual identity:                [] Adheres to an individual form of spirituality:           [] Not able to assess:                           Identified resources for coping:      [x] Prayer                               [] Music                  [] Guided Imagery     [x] Family/friends                 [] Pet visits     [] Devotional reading                         [] Unknown     [] Other:                                               Interventions offered during this visit: (See comments for more details)    Patient Interventions: Affirmation of jeffery, Iconic (affirming the presence of God/Higher Power), Initial/Spiritual assessment, patient floor, Affirmation of emotions/emotional suffering, Prayer (actual)           Plan of Care:     [x] Support spiritual and/or cultural needs    [] Support AMD and/or advance care planning process      [] Support grieving process   [] Coordinate Rites and/or Rituals    [] Coordination with community clergy   [] No spiritual needs identified at this time   [] Detailed Plan of Care below (See Comments)  [] Make referral to Music Therapy  [] Make referral to Pet Therapy     [] Make referral to Addiction services  [] Make referral to [facility]  [] Make referral to Spiritual Care Partner  [] No future visits requested        [x] Contact Spiritual Care for further referrals     Comments:     Agreed to a pastoral care visit and was thankful for listening presence and visit by [address] [place] [address] Road. Shared challenge of having to climb stairs to return home and the need for physical therapy as a next step. Friends are so supportive and visitors have been to see Ms. Lucho Christopher here at [address] Grateful for prayer from [address] [place] [address] and shared you can never have enough prayer. Involved with a nearby [address] in the area. [address].   SILVESTRE Mai

## 2023-03-24 NOTE — PROGRESS NOTES
Ortho / Neurosurgery NP Note    POD# 3  s/p OPEN REDUCTION INTERNAL FIXATION LEFT LATERAL MALLEOLUS, OPEN REDUCTION INTERNAL FIXATION SYNDESMOSIS AND LATERAL LIGAMENT RECONSTRUCTION(GENERAL WITH POPLITEAL AND ADDUCTOR BLOCK)   Pt seen with no visitor present. Pt resting in bed. Awake and alert, in NAD. Reports postop pain well controlled with oxycodone 10 mg   Denies numbness or tingling in LLE    Tolerating regular diet. No nausea. Hx Chron's, bowel regimen d/c    VSS Afebrile. Room air. Visit Vitals  BP (!) 146/83   Pulse 92   Temp 98.4 °F (36.9 °C)   Resp 16   Ht 5' 9\" (1.753 m)   Wt 126.2 kg (278 lb 3.5 oz)   LMP 01/20/2023 (Approximate)   SpO2 100%   BMI 41.09 kg/m²       Voiding status: voiding   Output (mL)  Urine Voided: 350 ml (03/23/23 0455)  Unmeasurable Output  Urine Occurrence(s): 1 (03/24/23 0752)  Stool Occurrence(s): 1 (03/23/23 2129)  Straight Cath  Straight Cath: Nurse performed cath;Sterile technique used (03/22/23 0506)  Number of Attempts: 1 (03/22/23 0506)  Catheter Size: 16 FR (03/22/23 0506)  Time Catheter Inserted: 1278 (03/22/23 0506)  Time Catheter Removed: 6733 (03/22/23 0506)  Urine: 800 mL (03/22/23 0506)      Labs    Lab Results   Component Value Date/Time    HGB 10.7 (L) 03/23/2023 04:08 AM      No results found for: INR, INREXT, INREXT   Lab Results   Component Value Date/Time    Sodium 136 03/22/2023 04:33 AM    Potassium 3.9 03/22/2023 04:33 AM    Chloride 104 03/22/2023 04:33 AM    CO2 27 03/22/2023 04:33 AM    Glucose 237 (H) 03/22/2023 04:33 AM    BUN 13 03/22/2023 04:33 AM    Creatinine 0.58 03/22/2023 04:33 AM    Calcium 8.9 03/22/2023 04:33 AM     Recent Glucose Results:   Lab Results   Component Value Date/Time    GLUCPOC 135 (H) 03/24/2023 11:14 AM    GLUCPOC 174 (H) 03/24/2023 06:37 AM    GLUCPOC 190 (H) 03/23/2023 08:54 PM       Body mass index is 41.09 kg/m². : A BMI > 30 is classified as obesity and > 40 is classified as morbid obesity.        Dressing c.d.i - LLE elevated   Calves soft and supple; No pain with passive stretch  Sensation and motor intact. +PF/DF/EHL intact. LLE skin warm/pink, +cap refill, unable to palpate pedal pulse due to cast  1+ expected swelling today   SCDs for mechanical DVT proph while in bed     PLAN:  1) PT/OT - NWB   2) Lovenox 30 mg BID for DVT Prophylaxis for 6 weeks  3) Pain control - scheduled tylenol, and prn  oxycodone  5-10 mg q4 hrs. 4) Uncontrolled DM2 -  A1C 9.2 (3/23). DTC following. Continue Lantus and SSI, 60 gram diabetic diet. 5) Discharge planning - Lives alone in second floor apartment. Medically stable for discharge. SNF placement, will need insurance auth, likely here through the weekend.      Matilde Simmons NP

## 2023-03-24 NOTE — PROGRESS NOTES
Transition of Care Plan:  RUR: 7%  Disposition: SNF  If SNF or IPR: Date FOC offered: 3/22  Date [address] received: 3/23  Date authorization started with reference number:  Date authorization received and expires:  Accepting facility:  Follow up appointments: ortho  DME needed: n/a  Transportation at Discharge: BLS  [place] or means to access home:  n/a      IM Medicare Letter: n/a  Caregiver Contact: sister Dmitriy Lama [phone]  Discharge Caregiver contacted prior to discharge? yes  Care Conference needed?: no      CM spoke with [facility], who agreed to review referral to see if they are able to accept.      [id number] [facility] [address] [place], [address] Detail Level: Detailed Quality 226: Preventive Care And Screening: Tobacco Use: Screening And Cessation Intervention: Patient screened for tobacco and never smoked Quality 110: Preventive Care And Screening: Influenza Immunization: Influenza immunization was not ordered or administered, reason not given Quality 431: Preventive Care And Screening: Unhealthy Alcohol Use - Screening: Patient screened for unhealthy alcohol use using a single question and scores less than 2 times per year Quality 130: Documentation Of Current Medications In The Medical Record: Current Medications Documented

## 2023-03-25 LAB
GLUCOSE BLD STRIP.AUTO-MCNC: 114 MG/DL (ref 65–117)
GLUCOSE BLD STRIP.AUTO-MCNC: 149 MG/DL (ref 65–117)
GLUCOSE BLD STRIP.AUTO-MCNC: 153 MG/DL (ref 65–117)
GLUCOSE BLD STRIP.AUTO-MCNC: 154 MG/DL (ref 65–117)
SERVICE CMNT-IMP: ABNORMAL
SERVICE CMNT-IMP: NORMAL

## 2023-03-25 PROCEDURE — 94760 N-INVAS EAR/PLS OXIMETRY 1: CPT

## 2023-03-25 PROCEDURE — 74011250637 HC RX REV CODE- 250/637

## 2023-03-25 PROCEDURE — 65270000029 HC RM PRIVATE

## 2023-03-25 PROCEDURE — 97116 GAIT TRAINING THERAPY: CPT

## 2023-03-25 PROCEDURE — 74011000250 HC RX REV CODE- 250: Performed by: STUDENT IN AN ORGANIZED HEALTH CARE EDUCATION/TRAINING PROGRAM

## 2023-03-25 PROCEDURE — 74011636637 HC RX REV CODE- 636/637

## 2023-03-25 PROCEDURE — 74011250637 HC RX REV CODE- 250/637: Performed by: STUDENT IN AN ORGANIZED HEALTH CARE EDUCATION/TRAINING PROGRAM

## 2023-03-25 PROCEDURE — 82962 GLUCOSE BLOOD TEST: CPT

## 2023-03-25 PROCEDURE — 74011250636 HC RX REV CODE- 250/636: Performed by: STUDENT IN AN ORGANIZED HEALTH CARE EDUCATION/TRAINING PROGRAM

## 2023-03-25 RX ADMIN — ENOXAPARIN SODIUM 30 MG: 100 INJECTION SUBCUTANEOUS at 08:25

## 2023-03-25 RX ADMIN — MESALAMINE 1600 MG: 400 CAPSULE, DELAYED RELEASE ORAL at 08:25

## 2023-03-25 RX ADMIN — SODIUM CHLORIDE, PRESERVATIVE FREE 10 ML: 5 INJECTION INTRAVENOUS at 21:12

## 2023-03-25 RX ADMIN — Medication 1 TABLET: at 17:09

## 2023-03-25 RX ADMIN — OXYCODONE HYDROCHLORIDE 5 MG: 5 TABLET ORAL at 15:34

## 2023-03-25 RX ADMIN — AZATHIOPRINE 100 MG: 50 TABLET ORAL at 08:25

## 2023-03-25 RX ADMIN — METFORMIN HYDROCHLORIDE 1000 MG: 500 TABLET, EXTENDED RELEASE ORAL at 17:09

## 2023-03-25 RX ADMIN — GLIPIZIDE 5 MG: 5 TABLET ORAL at 17:09

## 2023-03-25 RX ADMIN — SODIUM CHLORIDE, PRESERVATIVE FREE 10 ML: 5 INJECTION INTRAVENOUS at 15:34

## 2023-03-25 RX ADMIN — ENOXAPARIN SODIUM 30 MG: 100 INJECTION SUBCUTANEOUS at 21:10

## 2023-03-25 RX ADMIN — METFORMIN HYDROCHLORIDE 1000 MG: 500 TABLET, EXTENDED RELEASE ORAL at 08:25

## 2023-03-25 RX ADMIN — Medication 2 UNITS: at 17:09

## 2023-03-25 RX ADMIN — Medication 1 TABLET: at 08:25

## 2023-03-25 RX ADMIN — INSULIN GLARGINE 30 UNITS: 100 INJECTION, SOLUTION SUBCUTANEOUS at 21:10

## 2023-03-25 RX ADMIN — OXYCODONE HYDROCHLORIDE 5 MG: 5 TABLET ORAL at 21:10

## 2023-03-25 RX ADMIN — Medication 1 AMPULE: at 21:10

## 2023-03-25 RX ADMIN — Medication 1 AMPULE: at 08:26

## 2023-03-25 RX ADMIN — GLIPIZIDE 5 MG: 5 TABLET ORAL at 08:25

## 2023-03-25 RX ADMIN — SODIUM CHLORIDE, PRESERVATIVE FREE 10 ML: 5 INJECTION INTRAVENOUS at 08:26

## 2023-03-25 RX ADMIN — Medication 2 UNITS: at 08:25

## 2023-03-25 RX ADMIN — PANTOPRAZOLE SODIUM 40 MG: 40 TABLET, DELAYED RELEASE ORAL at 08:25

## 2023-03-25 RX ADMIN — THERA TABS 1 TABLET: TAB at 08:25

## 2023-03-25 RX ADMIN — Medication 1 TABLET: at 11:59

## 2023-03-25 NOTE — PROGRESS NOTES
Ortho / Neurosurgery NP Note    POD# 4  s/p OPEN REDUCTION INTERNAL FIXATION LEFT LATERAL MALLEOLUS, OPEN REDUCTION INTERNAL FIXATION SYNDESMOSIS AND LATERAL LIGAMENT RECONSTRUCTION(GENERAL WITH POPLITEAL AND ADDUCTOR BLOCK)   Pt seen with no visitor present. Pt resting in bed. Awake and alert, in NAD. Reports postop pain well controlled with oxycodone 10 mg   Denies numbness or tingling in LLE    Tolerating regular diet. No nausea. Hx Chron's, bowel regimen d/c    VSS Afebrile. Room air. Visit Vitals  BP (!) 139/90   Pulse 83   Temp 98.2 °F (36.8 °C)   Resp 18   Ht 5' 9\" (1.753 m)   Wt 126.2 kg (278 lb 3.5 oz)   LMP 01/20/2023 (Approximate)   SpO2 97%   BMI 41.09 kg/m²       Voiding status: voiding   Output (mL)  Urine Voided: 350 ml (03/23/23 0455)  Unmeasurable Output  Urine Occurrence(s): 1 (03/25/23 0654)  Stool Occurrence(s): 1 (03/23/23 2129)  Straight Cath  Straight Cath: Nurse performed cath;Sterile technique used (03/22/23 0506)  Number of Attempts: 1 (03/22/23 0506)  Catheter Size: 16 FR (03/22/23 0506)  Time Catheter Inserted: 1037 (03/22/23 0506)  Time Catheter Removed: 9684 (03/22/23 0506)  Urine: 800 mL (03/22/23 0506)      Labs    Lab Results   Component Value Date/Time    HGB 10.7 (L) 03/23/2023 04:08 AM      No results found for: INR, INREXT, INREXT   Lab Results   Component Value Date/Time    Sodium 136 03/22/2023 04:33 AM    Potassium 3.9 03/22/2023 04:33 AM    Chloride 104 03/22/2023 04:33 AM    CO2 27 03/22/2023 04:33 AM    Glucose 237 (H) 03/22/2023 04:33 AM    BUN 13 03/22/2023 04:33 AM    Creatinine 0.58 03/22/2023 04:33 AM    Calcium 8.9 03/22/2023 04:33 AM     Recent Glucose Results:   Lab Results   Component Value Date/Time    GLUCPOC 149 (H) 03/25/2023 06:39 AM    GLUCPOC 193 (H) 03/24/2023 09:50 PM    GLUCPOC 188 (H) 03/24/2023 04:11 PM       Body mass index is 41.09 kg/m². : A BMI > 30 is classified as obesity and > 40 is classified as morbid obesity.        Dressing c.d.i - LLE elevated   Calves soft and supple; No pain with passive stretch  Sensation and motor intact. +PF/DF/EHL intact. LLE skin warm/pink, +cap refill, unable to palpate pedal pulse due to cast  1+ expected swelling today   SCDs for mechanical DVT proph while in bed     PLAN:  1) PT/OT - NWB   2) Lovenox 30 mg BID for DVT Prophylaxis for 6 weeks  3) Pain control - scheduled tylenol, and prn  oxycodone  5-10 mg q4 hrs. 4) Uncontrolled DM2 -  A1C 9.2 (3/23). DTC following. Continue Lantus and SSI, 60 gram diabetic diet. 5) Discharge planning - Lives alone in second floor apartment. Medically stable for discharge.    SNF placement - dc once able    Toño Coyle MD

## 2023-03-25 NOTE — PROGRESS NOTES
End of Shift Note    Bedside shift change report given to María Elena Carballo RN (oncoming nurse) by Kianna Roth RN (offgoing nurse). Report included the following information SBAR, Kardex, Intake/Output, and MAR    Shift worked:  7P-7A     Shift summary and any significant changes:     Pt is alert and orientation. Pain controlled by oxycodone. Concerns for physician to address:       Zone phone for oncoming shift:   [phone]       Activity:  Activity Level: Up with Assistance  Number times ambulated in hallways past shift: 0  Number of times OOB to chair past shift: 1    Cardiac:   Cardiac Monitoring: No      Cardiac Rhythm: Sinus Rhythm    Access:  Current line(s): PIV     Genitourinary:   Urinary status: voiding    Respiratory:   O2 Device: None (Room air)  Chronic home O2 use?: NO  Incentive spirometer at bedside: YES       GI:     Current diet:  ADULT DIET Regular; 4 carb choices (60 gm/meal); please cook veg extra soft if pt orders  Passing flatus: YES  Tolerating current diet: YES       Pain Management:   Patient states pain is manageable on current regimen: YES    Skin:  Sergio Score: 19  Interventions: float heels, increase time out of bed, and PT/OT consult    Patient Safety:  Fall Score:  Total Score: 4  Interventions: bed/chair alarm, assistive device (walker, cane, etc), gripper socks, and pt to call before getting OOB  High Fall Risk: Yes    Length of Stay:  Expected LOS: 2d 16h  Actual LOS: 4      Urbano De Dios RN

## 2023-03-25 NOTE — PROGRESS NOTES
Problem: Mobility Impaired (Adult and Pediatric)  Goal: *Acute Goals and Plan of Care (Insert Text)  Description: FUNCTIONAL STATUS PRIOR TO ADMISSION: Patient was independent and active without use of DME.    HOME SUPPORT PRIOR TO ADMISSION: The patient lived alone with local family to provide assistance. Physical Therapy Goals  Initiated 3/22/2023    1. Patient will move from supine to sit and sit to supine  in bed with minimum assistance with bed in flat position within 4 days. 2. Patient will perform sit to stand with stand by assistance within 4 days. 3. Patient will ambulate with minimum assistance  assist for 15 feet with the least restrictive device within 4 days. 4. Patient will ascend/descend 14 stairs with 1 handrail(s) with modified independence within 4 days. 5. Patient will verbalize and demonstrate understanding of L LE NWB precautions per protocol within 4 days. Outcome: Progressing Towards Goal   PHYSICAL THERAPY TREATMENT  Patient: Yashira Garcia (10 y.o. female)  Date: 3/25/2023  Diagnosis: Closed left ankle fracture [S82.892A] <principal problem not specified>  Procedure(s) (LRB):  OPEN REDUCTION INTERNAL FIXATION LEFT LATERAL MALLEOLUS, OPEN REDUCTION INTERNAL FIXATION SYNDESMOSIS AND LATERAL LIGAMENT RECONSTRUCTION(GENERAL WITH POPLITEAL AND ADDUCTOR BLOCK) (Left) 4 Days Post-Op  Precautions: Fall, NWB  Chart, physical therapy assessment, plan of care and goals were reviewed. ASSESSMENT  Patient continues with skilled PT services and is slowly progressing towards goals. Pt continues to demonstrate limited functional mobility and decreased independence secondary to impaired balance, generalized weakness, increased LLE pain, impaired gait mechanics, NWB LLE precautions, and decreased activity tolerance. Received supine in bed and agreeable to PT intervention. Reported 3/10 LLE pain at rest and 3-4/10 pain post-activity.  Demonstrated safe mobility throughout session with use of RW. Able to tolerate increased gait distance, however continues to fatigue quickly. Pt was left sitting in bedside chair with all needs met and BLEs elevated following session. Recommend SNF rehab at discharge. Current Level of Function Impacting Discharge (mobility/balance): independent for bed mobility; SBA/CGA for transfers with RW; CGA for gait training with RW support    Other factors to consider for discharge: lives alone; second floor apartment         PLAN :  Patient continues to benefit from skilled intervention to address the above impairments. Continue treatment per established plan of care. to address goals. Recommendation for discharge: (in order for the patient to meet his/her long term goals)  Therapy up to 5 days/week in SNF setting    This discharge recommendation:  Has been made in collaboration with the attending provider and/or case management    IF patient discharges home will need the following DME: to be determined (TBD)       SUBJECTIVE:   Patient stated It's not too bad.  referring to pain    OBJECTIVE DATA SUMMARY:   Critical Behavior:  Neurologic State: Alert  Orientation Level: Oriented X4  Cognition: Follows commands  Safety/Judgement: Awareness of environment  Functional Mobility Training:  Bed Mobility:  Rolling: Independent;Bed Modified  Supine to Sit: Independent;Bed Modified     Scooting: Independent        Transfers:  Sit to Stand: Stand-by assistance (elevated bed height)  Stand to Sit: Contact guard assistance                             Balance:  Sitting: Intact  Standing: With support; Impaired  Standing - Static: Constant support;Good  Standing - Dynamic : Fair;Constant support  Ambulation/Gait Training:  Distance (ft): 40 Feet (ft)  Assistive Device: Gait belt;Walker, rolling  Ambulation - Level of Assistance: Contact guard assistance; Additional time; Adaptive equipment        Gait Abnormalities: Antalgic (hop-to gait pattern)     Left Side Weight Bearing: Non-weight bearing  Base of Support: Shift to right     Speed/Dominique: Shuffled; Slow  Step Length: Right shortened    Pain Rating:  3-4/10 LLE pain    Activity Tolerance:   Good and requires rest breaks    After treatment patient left in no apparent distress:   Sitting in chair, Call bell within reach, and BLEs reclined    COMMUNICATION/COLLABORATION:   The patients plan of care was discussed with: Physical therapist and Registered nurse.      Damaso Botello PT, DPT   Time Calculation: 8 mins

## 2023-03-25 NOTE — PROGRESS NOTES
End of Shift Note    Bedside shift change report given to  Trevor MASSEY RN(oncoming nurse) by Bradly Noel RN (offgoing nurse). Report included the following information SBAR, Kardex, OR Summary, Procedure Summary, Intake/Output, and MAR    Shift worked:  7-7     Shift summary and any significant changes:          Concerns for physician to address:         Zone phone for oncoming shift:          Activity:  Activity Level: Up with Assistance  Number times ambulated in hallways past shift: 0  Number of times OOB to chair past shift: 2    Cardiac:   Cardiac Monitoring: No      Cardiac Rhythm: Sinus Rhythm    Access:  Current line(s): PIV     Genitourinary:   Urinary status: voiding    Respiratory:   O2 Device: None (Room air)  Chronic home O2 use?: NO  Incentive spirometer at bedside: YES       GI:     Current diet:  ADULT DIET Regular; 4 carb choices (60 gm/meal); please cook veg extra soft if pt orders  Passing flatus: YES  Tolerating current diet: YES       Pain Management:   Patient states pain is manageable on current regimen: YES    Skin:  Sergio Score: 19  Interventions: float heels, increase time out of bed, PT/OT consult, limit briefs, and nutritional support     Patient Safety:  Fall Score:  Total Score: 4  Interventions: assistive device (walker, cane, etc), gripper socks, pt to call before getting OOB, stay with me (per policy), and gait belt  High Fall Risk: Yes    Length of Stay:  Expected LOS: 2d 16h  Actual LOS: 27 Enrique Ross RN

## 2023-03-25 NOTE — PROGRESS NOTES
Problem: Falls - Risk of  Goal: *Absence of Falls  Description: Document Siva Donohue Fall Risk and appropriate interventions in the flowsheet.   Outcome: Progressing Towards Goal  Note: Fall Risk Interventions:  Mobility Interventions: Utilize walker, cane, or other assistive device, PT Consult for assist device competence, PT Consult for mobility concerns, Patient to call before getting OOB         Medication Interventions: Teach patient to arise slowly, Patient to call before getting OOB    Elimination Interventions: Patient to call for help with toileting needs, Elevated toilet seat, Call light in reach    History of Falls Interventions: Bed/chair exit alarm, Door open when patient unattended         Problem: Patient Education: Go to Patient Education Activity  Goal: Patient/Family Education  Outcome: Progressing Towards Goal     Problem: Pain  Goal: *Control of Pain  Outcome: Progressing Towards Goal

## 2023-03-26 LAB
GLUCOSE BLD STRIP.AUTO-MCNC: 102 MG/DL (ref 65–117)
GLUCOSE BLD STRIP.AUTO-MCNC: 144 MG/DL (ref 65–117)
GLUCOSE BLD STRIP.AUTO-MCNC: 148 MG/DL (ref 65–117)
GLUCOSE BLD STRIP.AUTO-MCNC: 163 MG/DL (ref 65–117)
SERVICE CMNT-IMP: ABNORMAL
SERVICE CMNT-IMP: NORMAL

## 2023-03-26 PROCEDURE — 74011250637 HC RX REV CODE- 250/637

## 2023-03-26 PROCEDURE — 82962 GLUCOSE BLOOD TEST: CPT

## 2023-03-26 PROCEDURE — 65270000029 HC RM PRIVATE

## 2023-03-26 PROCEDURE — 74011000250 HC RX REV CODE- 250: Performed by: STUDENT IN AN ORGANIZED HEALTH CARE EDUCATION/TRAINING PROGRAM

## 2023-03-26 PROCEDURE — 97116 GAIT TRAINING THERAPY: CPT | Performed by: PHYSICAL THERAPIST

## 2023-03-26 PROCEDURE — 74011250637 HC RX REV CODE- 250/637: Performed by: PHYSICIAN ASSISTANT

## 2023-03-26 PROCEDURE — 94760 N-INVAS EAR/PLS OXIMETRY 1: CPT

## 2023-03-26 PROCEDURE — 74011250637 HC RX REV CODE- 250/637: Performed by: STUDENT IN AN ORGANIZED HEALTH CARE EDUCATION/TRAINING PROGRAM

## 2023-03-26 PROCEDURE — 74011636637 HC RX REV CODE- 636/637

## 2023-03-26 PROCEDURE — 74011250636 HC RX REV CODE- 250/636: Performed by: STUDENT IN AN ORGANIZED HEALTH CARE EDUCATION/TRAINING PROGRAM

## 2023-03-26 RX ORDER — LISINOPRIL 10 MG/1
10 TABLET ORAL DAILY
Status: DISCONTINUED | OUTPATIENT
Start: 2023-03-26 | End: 2023-03-29 | Stop reason: HOSPADM

## 2023-03-26 RX ADMIN — Medication 1 AMPULE: at 08:20

## 2023-03-26 RX ADMIN — LISINOPRIL 10 MG: 10 TABLET ORAL at 10:13

## 2023-03-26 RX ADMIN — Medication 1 AMPULE: at 22:25

## 2023-03-26 RX ADMIN — Medication 1 TABLET: at 12:33

## 2023-03-26 RX ADMIN — METFORMIN HYDROCHLORIDE 1000 MG: 500 TABLET, EXTENDED RELEASE ORAL at 17:26

## 2023-03-26 RX ADMIN — INSULIN GLARGINE 30 UNITS: 100 INJECTION, SOLUTION SUBCUTANEOUS at 22:33

## 2023-03-26 RX ADMIN — ENOXAPARIN SODIUM 30 MG: 100 INJECTION SUBCUTANEOUS at 22:24

## 2023-03-26 RX ADMIN — SODIUM CHLORIDE, PRESERVATIVE FREE 10 ML: 5 INJECTION INTRAVENOUS at 17:24

## 2023-03-26 RX ADMIN — GLIPIZIDE 5 MG: 5 TABLET ORAL at 08:18

## 2023-03-26 RX ADMIN — MESALAMINE 1600 MG: 400 CAPSULE, DELAYED RELEASE ORAL at 08:18

## 2023-03-26 RX ADMIN — Medication 1 TABLET: at 17:23

## 2023-03-26 RX ADMIN — PANTOPRAZOLE SODIUM 40 MG: 40 TABLET, DELAYED RELEASE ORAL at 08:18

## 2023-03-26 RX ADMIN — ENOXAPARIN SODIUM 30 MG: 100 INJECTION SUBCUTANEOUS at 08:17

## 2023-03-26 RX ADMIN — METFORMIN HYDROCHLORIDE 1000 MG: 500 TABLET, EXTENDED RELEASE ORAL at 08:18

## 2023-03-26 RX ADMIN — THERA TABS 1 TABLET: TAB at 08:17

## 2023-03-26 RX ADMIN — AZATHIOPRINE 100 MG: 50 TABLET ORAL at 08:17

## 2023-03-26 RX ADMIN — SODIUM CHLORIDE, PRESERVATIVE FREE 10 ML: 5 INJECTION INTRAVENOUS at 06:07

## 2023-03-26 RX ADMIN — OXYCODONE HYDROCHLORIDE 5 MG: 5 TABLET ORAL at 22:24

## 2023-03-26 RX ADMIN — GLIPIZIDE 5 MG: 5 TABLET ORAL at 17:24

## 2023-03-26 RX ADMIN — Medication 2 UNITS: at 17:23

## 2023-03-26 RX ADMIN — Medication 2 UNITS: at 08:18

## 2023-03-26 RX ADMIN — Medication 1 TABLET: at 08:18

## 2023-03-26 RX ADMIN — OXYCODONE HYDROCHLORIDE 5 MG: 5 TABLET ORAL at 12:33

## 2023-03-26 NOTE — PROGRESS NOTES
ORTHO - Progress Note  Post Op day: 5 Days [facility]     [id number]  female    49 y.o.    1975    Admit date:3/21/2023  Date of Surgery:3/21/2023   Procedures:Procedure(s):OPEN REDUCTION INTERNAL FIXATION LEFT LATERAL MALLEOLUS, OPEN REDUCTION INTERNAL FIXATION SYNDESMOSIS AND LATERAL LIGAMENT RECONSTRUCTION(GENERAL WITH POPLITEAL AND ADDUCTOR BLOCK)  Surgeon:Surgeon(s) and Role:   Henry Walter MD - Primary        SUBJECTIVE:     Yashira Garcia is a 49 y.o. female resting in the chair. Smiling and . Patient has complaints of appropriate post-op pain, tolerating PO pain medications, PRN oxy  Denies F/C, nausea, vomiting, dizziness, lightheadedness, chest pain, or shortness of breath. OBJECTIVE:       Physical Exam:  General: alert, cooperative, no distress. Gastrointestinal:  non-distended . Cardiovascular: cap refill in all distal extremities 2-3 secs  Genitourinary: Voiding independently   Respiratory: No respiratory distress   Neurological:Neurovascular exam within normal limits. Senstion intact: LE bilat. Motor: + FHL/EHL. Musculoskeletal: Mahamed's sign negative in bilateral lower extremities. Prox Calves soft, supple, non-tender upon palpation or with passive stretch. Dressing/Wound:  short leg splint Clean, dry and intact. No significant erythema or swelling. Vital Signs:       Patient Vitals for the past 8 hrs:   BP Temp Pulse Resp SpO2   23 1131 135/68 98.1 °F (36.7 °C) 76 19 98 %   23 0750 (!) 184/93 98.1 °F (36.7 °C) 99 16 97 %   23 0406 (!) 145/83 98.2 °F (36.8 °C) 75 18 93 %                                          Temp (24hrs), Av.2 °F (36.8 °C), Min:98.1 °F (36.7 °C), Max:98.2 °F (36.8 °C)      Labs:      No results for input(s): HCT, HGB, INR, HCTEXT, HGBEXT, INREXT in the last 72 hours.   PT/OT:        Gait  Base of Support: Shift to right  Speed/Dominique: Shuffled, Slow  Step Length: Right shortened  Gait Abnormalities: Antalgic (hop-to gait pattern)  Ambulation - Level of Assistance: Contact guard assistance, Additional time, Adaptive equipment  Distance (ft): 40 Feet (ft)  Assistive Device: Gait belt, Walker, rolling     ASSESSMENT / PLAN:   Active Problems:    Closed left ankle fracture (3/21/2023)       PLAN:  1) PT/OT - NWB -  pt transferring w/o sig assistance   2) Lovenox 30 mg BID for DVT Prophylaxis for 6 weeks  3) Pain control - scheduled tylenol, and prn  oxycodone  5-10 mg q4 hrs. 4) Uncontrolled DM2 -  A1C 9.2 (3/23). DTC following. Continue Lantus and SSI, 60 gram diabetic diet. 5) Discharge planning - Lives alone in second floor apartment. Medically stable for discharge.    SNF placement - DC once able    Pt with Crohn's DS- on axathioprine and mesalamine ---- NO ASA    Signed By: Joseph Larson PA-C

## 2023-03-26 NOTE — PROGRESS NOTES
Problem: Mobility Impaired (Adult and Pediatric)  Goal: *Acute Goals and Plan of Care (Insert Text)  Description: FUNCTIONAL STATUS PRIOR TO ADMISSION: Patient was independent and active without use of DME.    HOME SUPPORT PRIOR TO ADMISSION: The patient lived alone with local family to provide assistance. Physical Therapy Goals  Initiated 3/22/2023    1. Patient will move from supine to sit and sit to supine  in bed with minimum assistance with bed in flat position within 4 days. 2. Patient will perform sit to stand with stand by assistance within 4 days. 3. Patient will ambulate with minimum assistance  assist for 15 feet with the least restrictive device within 4 days. 4. Patient will ascend/descend 14 stairs with 1 handrail(s) with modified independence within 4 days. 5. Patient will verbalize and demonstrate understanding of L LE NWB precautions per protocol within 4 days. Outcome: Progressing Towards Goal  Note:   PHYSICAL THERAPY TREATMENT  Patient: Wendy Stovall (53 y.o. female)  Date: 3/26/2023  Diagnosis: Closed left ankle fracture [S82.892A] <principal problem not specified>  Procedure(s) (LRB):  OPEN REDUCTION INTERNAL FIXATION LEFT LATERAL MALLEOLUS, OPEN REDUCTION INTERNAL FIXATION SYNDESMOSIS AND LATERAL LIGAMENT RECONSTRUCTION(GENERAL WITH POPLITEAL AND ADDUCTOR BLOCK) (Left) 5 Days Post-Op  Precautions: Fall, NWB  Chart, physical therapy assessment, plan of care and goals were reviewed. ASSESSMENT  Patient continues with skilled PT services and is progressing towards goals. Up in chair upon arrival; agreeable to mobility. Able to come to stand with supervision while maintaining NWB left LE. Patient ambulated 39' out in headley with rolling walker and CGA. Patient is able to maintain NWB left LE without difficulty, and is steady with no LOB. One standing rest break required. Returned to chair at end of session with LE's elevated.   Instructed in chair push-up and and right ankle pumps, LAQ's, and seated marching for strengthening. Recommend SNF at discharge. Current Level of Function Impacting Discharge (mobility/balance): CGA    Other factors to consider for discharge: NWB left; lives alone         PLAN :  Patient continues to benefit from skilled intervention to address the above impairments. Continue treatment per established plan of care. to address goals. Recommendation for discharge: (in order for the patient to meet his/her long term goals)  Therapy up to 5 days/week in SNF setting    This discharge recommendation:  Has been made in collaboration with the attending provider and/or case management    IF patient discharges home will need the following DME: to be determined (TBD)       SUBJECTIVE:   Patient stated I can go a little further.     OBJECTIVE DATA SUMMARY:   Critical Behavior:  Neurologic State: (P) Alert  Orientation Level: (P) Oriented X4  Cognition: (P) Appropriate safety awareness, Follows commands  Safety/Judgement: Awareness of environment  Functional Mobility Training:  Bed Mobility:                    Transfers:  Sit to Stand: Supervision  Stand to Sit: Stand-by assistance                             Balance:  Sitting: Intact  Standing: Intact; With support  Standing - Static: Constant support;Good  Standing - Dynamic : Constant support;Good  Ambulation/Gait Training:  Distance (ft): 45 Feet (ft)  Assistive Device: Gait belt;Walker, rolling  Ambulation - Level of Assistance: Contact guard assistance        Gait Abnormalities: Decreased step clearance     Left Side Weight Bearing: Non-weight bearing  Base of Support: Shift to right     Speed/Dominique: Pace decreased (<100 feet/min)  Step Length: Right shortened                                Therapeutic Exercises:   Reviewed exercises  Pain Rating:  None reported    Activity Tolerance:   Good and requires rest breaks    After treatment patient left in no apparent distress:   Sitting in chair, Heels elevated for pressure relief, and Call bell within reach    COMMUNICATION/COLLABORATION:   The patients plan of care was discussed with: Registered nurse.      Faizan Bermudez, PT   Time Calculation: 10 mins

## 2023-03-27 LAB
GLUCOSE BLD STRIP.AUTO-MCNC: 128 MG/DL (ref 65–117)
GLUCOSE BLD STRIP.AUTO-MCNC: 135 MG/DL (ref 65–117)
GLUCOSE BLD STRIP.AUTO-MCNC: 142 MG/DL (ref 65–117)
GLUCOSE BLD STRIP.AUTO-MCNC: 88 MG/DL (ref 65–117)
SERVICE CMNT-IMP: ABNORMAL
SERVICE CMNT-IMP: NORMAL

## 2023-03-27 PROCEDURE — 97116 GAIT TRAINING THERAPY: CPT

## 2023-03-27 PROCEDURE — 82962 GLUCOSE BLOOD TEST: CPT

## 2023-03-27 PROCEDURE — 74011250637 HC RX REV CODE- 250/637

## 2023-03-27 PROCEDURE — 74011250637 HC RX REV CODE- 250/637: Performed by: STUDENT IN AN ORGANIZED HEALTH CARE EDUCATION/TRAINING PROGRAM

## 2023-03-27 PROCEDURE — 94760 N-INVAS EAR/PLS OXIMETRY 1: CPT

## 2023-03-27 PROCEDURE — 97535 SELF CARE MNGMENT TRAINING: CPT | Performed by: OCCUPATIONAL THERAPIST

## 2023-03-27 PROCEDURE — 74011250636 HC RX REV CODE- 250/636: Performed by: STUDENT IN AN ORGANIZED HEALTH CARE EDUCATION/TRAINING PROGRAM

## 2023-03-27 PROCEDURE — 74011000250 HC RX REV CODE- 250: Performed by: STUDENT IN AN ORGANIZED HEALTH CARE EDUCATION/TRAINING PROGRAM

## 2023-03-27 PROCEDURE — 74011250637 HC RX REV CODE- 250/637: Performed by: PHYSICIAN ASSISTANT

## 2023-03-27 PROCEDURE — 74011636637 HC RX REV CODE- 636/637

## 2023-03-27 PROCEDURE — 65270000029 HC RM PRIVATE

## 2023-03-27 RX ORDER — POLYETHYLENE GLYCOL 3350 17 G/17G
17 POWDER, FOR SOLUTION ORAL
Status: DISCONTINUED | OUTPATIENT
Start: 2023-03-27 | End: 2023-03-29 | Stop reason: HOSPADM

## 2023-03-27 RX ORDER — AMOXICILLIN 250 MG
1 CAPSULE ORAL
Status: DISCONTINUED | OUTPATIENT
Start: 2023-03-27 | End: 2023-03-29 | Stop reason: HOSPADM

## 2023-03-27 RX ADMIN — Medication 1 AMPULE: at 21:33

## 2023-03-27 RX ADMIN — LISINOPRIL 10 MG: 10 TABLET ORAL at 08:30

## 2023-03-27 RX ADMIN — MESALAMINE 1600 MG: 400 CAPSULE, DELAYED RELEASE ORAL at 08:31

## 2023-03-27 RX ADMIN — THERA TABS 1 TABLET: TAB at 08:31

## 2023-03-27 RX ADMIN — ENOXAPARIN SODIUM 30 MG: 100 INJECTION SUBCUTANEOUS at 08:31

## 2023-03-27 RX ADMIN — PANTOPRAZOLE SODIUM 40 MG: 40 TABLET, DELAYED RELEASE ORAL at 08:31

## 2023-03-27 RX ADMIN — Medication 1 TABLET: at 08:30

## 2023-03-27 RX ADMIN — GLIPIZIDE 5 MG: 5 TABLET ORAL at 08:30

## 2023-03-27 RX ADMIN — Medication 1 TABLET: at 18:24

## 2023-03-27 RX ADMIN — METFORMIN HYDROCHLORIDE 1000 MG: 500 TABLET, EXTENDED RELEASE ORAL at 18:23

## 2023-03-27 RX ADMIN — METFORMIN HYDROCHLORIDE 1000 MG: 500 TABLET, EXTENDED RELEASE ORAL at 08:31

## 2023-03-27 RX ADMIN — AZATHIOPRINE 100 MG: 50 TABLET ORAL at 08:31

## 2023-03-27 RX ADMIN — OXYCODONE HYDROCHLORIDE 10 MG: 5 TABLET ORAL at 09:08

## 2023-03-27 RX ADMIN — Medication 1 AMPULE: at 08:31

## 2023-03-27 RX ADMIN — GLIPIZIDE 5 MG: 5 TABLET ORAL at 18:23

## 2023-03-27 RX ADMIN — ENOXAPARIN SODIUM 30 MG: 100 INJECTION SUBCUTANEOUS at 21:31

## 2023-03-27 RX ADMIN — INSULIN GLARGINE 30 UNITS: 100 INJECTION, SOLUTION SUBCUTANEOUS at 21:31

## 2023-03-27 RX ADMIN — Medication 1 TABLET: at 12:09

## 2023-03-27 RX ADMIN — SODIUM CHLORIDE, PRESERVATIVE FREE 10 ML: 5 INJECTION INTRAVENOUS at 21:34

## 2023-03-27 RX ADMIN — Medication 2 UNITS: at 18:24

## 2023-03-27 NOTE — PROGRESS NOTES
End of Shift Note    Bedside shift change report given to LOGAN holden (oncoming nurse) by Belen Iglesias (offgoing nurse). Report included the following information SBAR, Kardex, OR Summary, Intake/Output, MAR, Recent Results, Alarm Parameters , and Quality Measures    Shift worked: 7 am-7 pm   Shift summary and any significant changes:       Concerns for physician to address:    Zone phone for oncoming shift:   [phone]       Activity:  Activity Level: Up with Assistance  Number times ambulated in hallways past shift: 1  Number of times OOB to chair past shift: 3    Cardiac:   Cardiac Monitoring: No      Cardiac Rhythm: Sinus Rhythm    Access:  Current line(s): PIV     Genitourinary:   Urinary status: voiding    Respiratory:   O2 Device: None (Room air)  Chronic home O2 use?: NO  Incentive spirometer at bedside: YES       GI:     Current diet:  ADULT DIET Regular; 4 carb choices (60 gm/meal); please cook veg extra soft if pt orders  Passing flatus: YES  Tolerating current diet: YES       Pain Management:   Patient states pain is manageable on current regimen: YES    Skin:  Sergio Score: 20  Interventions: float heels, increase time out of bed, foam dressing, PT/OT consult, and limit briefs    Patient Safety:  Fall Score:  Total Score: 4  Interventions: bed/chair alarm, assistive device (walker, cane, etc), gripper socks, pt to call before getting OOB, and stay with me (per policy)  High Fall Risk: Yes    Length of Stay:  Expected LOS: 2d 16h  Actual LOS: [facility]

## 2023-03-27 NOTE — PROGRESS NOTES
Transition of Care Plan:  RUR: 7%  Disposition: SNF  If SNF or IPR: Date FOC offered: 3/22  Date [address] received: 3/23  Date authorization started with reference number: 3/27  Date authorization received and expires:  Accepting facility: [facility]  Follow up appointments: ortho  DME needed: n/a  Transportation at Discharge: [address] [place] [address] or means to access home:  n/a      IM Medicare Letter: n/a  Caregiver Contact: sister Troy Madera [phone]  Discharge Caregiver contacted prior to discharge? yes  Care Conference needed?: no    2:40pm  KEATON spoke with Marchsusana Wongmohit at [facility] who reported she is initiating auth right now. Update 1:25pm  [facility] has accepted and will initiate insurance auth. Initial note  Pt's referral is still under review with [facility] DON. All other SNFs declined that patient had choosen. CM spoke with patient who agreed with CM sending out multiple referrals to see what SNFs would be able to accept and pt can choose from accepting facilities.      [id number] [facility], [place], [address]

## 2023-03-27 NOTE — DIABETES MGMT
[facility]    CLINICAL NURSE SPECIALIST CONSULT     Initial Presentation   Melissa Martinez is a 49 y.o. female admitted 3/21/2023 after experiencing a left leg fracture . LAB:Glucose 228. Creatine 0.78. GFR >60. A1c 9.2%  Date of Procedure: 3/21/2023      Pre-Op Diagnosis:   LEFT LATERAL MALLEOLUS FRACTURE  UNCONTROLLED TYPE II DIABETES WITH NEUROPATHY     Post-Op Diagnosis:   LEFT LATERAL MALLEOLUS FRACTURE  LATERAL LIGAMENT INSTABILITY  UNCONTROLLED TYPE II DIABETES WITH NEUROPATHY     Procedure(s):  OPEN REDUCTION INTERNAL FIXATION LEFT LATERAL MALLEOLUS,   OPEN REDUCTION INTERNAL FIXATION SYNDESMOSIS  LATERAL LIGAMENT RECONSTRUCTION     Surgeon(s):  Godfrey Garcia MD     Surgical Assistant: Surg Asst-1: James Dallas     Anesthesia: General      Estimated Blood Loss (mL): less than 585      Complications: None     Specimens: * No specimens in log *      Implants:   Implant Name Type Inv. Item Serial No.  Lot No. LRB No. Used Action   SCREW BNE L20MM DIA3.5MM NONLOCKING FOR R3CON PLATING SYS - SN/A   SCREW BNE L20MM DIA3.5MM NONLOCKING FOR R3CON PLATING SYS N/A PARAGON 28_WD N/A Left 1 Implanted   11 hole left plate     N/A PARAGON 28_WD N/A Left 1 Implanted   SCREW BNE L12MM DIA3. 5MM R3CON BURAK PLT [place] PLATING SYS - SN/A   SCREW BNE L12MM DIA3. 5MM R3CON BURAK PLT [place] PLATING SYS N/A PARAGON 28_WD N/A Left 1 Implanted   SCREW BNE L14MM DIA3.5MM NONLOCKING FOR R3CON PLATING SYS - SN/A   SCREW BNE L14MM DIA3.5MM NONLOCKING FOR R3CON PLATING SYS N/A PARAGON 28_WD N/A Left 3 Implanted   SCREW BNE L10MM DIA3. 5MM BURAK FOR R3CON PLATING SYS [place]ILLA - SN/A   SCREW BNE L10MM DIA3. 5MM BURAK FOR R3CON PLATING SYS [place] N/A PARAGON 28_WD N/A Left 1 Implanted   3.5 x 52mm non-locking screw     N/A PARAGON 28_WD N/A Left 1 Implanted   SCREW BNE L50MM [id number] NONLOCKING [place] PLATING SYS R3CON - SN/A   SCREW BNE L50MM [id number] NONLOCKING [place] PLATING SYS R3CON N/A PARAGON 28_WD N/A Left 1 Implanted   3.5 x 58mm non-locking screw     N/A PARAGON 28_WD N/A Left 1 Implanted   DX FIBERTAK SUTURE ANCHOR #2 MTS WITH NDL - [id number]   DX FIBERTAK SUTURE ANCHOR #2 MTS WITH NDL [id number] [id number] Left 1 Implanted   DX FIBERTAK SUTURE ANCHOR #2 MTS WITH NDL - [id number]   DX FIBERTAK SUTURE ANCHOR #2 MTS WITH NDL [id number] [id number] Left 1 Implanted   INTERNAL BRACE IMPLANT SYSTEM LIGAMENT AUGMENTATION REPAIR, BIOCOMPOSITE with [employer] DRESSING     [id number] [id number] Left 1 Implanted        HX:   Past Medical History:   Diagnosis Date    Anemia     Autoimmune disease ([address]     crohn's disease    Closed left ankle fracture 3/21/2023    Crohn disease ([facility] [address]     Diabetes ([facility] [address]     GERD (gastroesophageal reflux disease)     History of colonoscopy with polypectomy 03/12/2020    Dr. Galicia Counts: Surveillance for UC. Polypectomy (1cm in sigmoid colon)--repeat 3yrs. Hypertension         INITIAL DX:   Closed left ankle fracture [K31.903R]     Current Treatment     TX: azathioprine. Clot prevention. Insulin. Mesalamine. Metformin. Protonix. Consulted by Provider for advanced diabetes nursing assessment and care for:   [] Transitioning off Karalee Curd   [x] Inpatient management strategy  [x] Home management assessment  [] Survival skill education    Hospital Course   Clinical progress has been uncomplicated . Diabetes History   The patient reports a hx of Type 2 diabetes. She reportedtly takes 20 units Lantus and Metformin. The patient was taken off [place] in February 2023 after an episode of DKA. The patient resides alone and reports a strong support system of friends and family.      Diabetes-related Medical History    Neurological complications  Peripheral neuropathy  Macrovascular disease  Cerebral vascular accident      Diabetes Medication History  Key Antihyperglycemic Medications               insulin glargine (LANTUS) 100 unit/mL injection (Taking) by SubCUTAneous route nightly. metFORMIN ER (GLUCOPHAGE XR) 500 mg tablet (Taking) TAKE 2 TABLETS BY MOUTH TWICE DAILY WITH MEALS             Diabetes self-management practices:   Eating pattern   [x] Eating a carbohydrate-controlled mealplan  ( Improving)    Monitoring pattern   [x] Testing BGs sufficiently to inform self-management adjustments    Taking medications pattern  [x] Consistent administration  [x] Affordable    Overall evaluation:    [x] Not achieving A1c target with drug therapy & self-care practices    Subjective   I appreciate you trying help me with my medication     Objective   Physical exam  General Obese female in no acute distress. Conversant and cooperative  Neuro  Alert, oriented   Vital Signs Visit Vitals  /65   Pulse 87   Temp 97.7 °F (36.5 °C)   Resp 16   Ht 5' 9\" (1.753 m)   Wt 126.2 kg (278 lb 3.5 oz)   SpO2 97%   BMI 41.09 kg/m²         Laboratory  No results for input(s): GLU, AGAP, TRIGL, WBC, CREA, GFRNA, AST, ALT in the last 72 hours.     No lab exists for component: A1C      Factors impacting BG management  Factor Dose Comments   Nutrition:  Standard meals     60 grams/meal      Pain PRN oxycodone  PRN Dilaudid    Infection Post-op Ancef Completed   Other:   Kidney function       Normal          Blood glucose pattern      Significant diabetes-related events over the past 24-72 hours  3/22/23 BG's  ranging 198-237 for past 24 hours  3/27/23 BG's stable for past 72 hours    Assessment and Plan   Nursing Diagnosis Risk for unstable blood glucose pattern   Nursing Intervention Domain 3138 Decision-making Support   Nursing Interventions Examined current inpatient diabetes/blood glucose control   Explored factors facilitating and impeding inpatient management  Explored corrective strategies with patient and responsible inpatient provider   Informed patient of rational for insulin strategy while hospitalized       Evaluation   This 49year old female with Type 2 diabetes did not achieve Bg control prior to admission as evidenced by an A1c of 9.2%. The patient is using Lantus 20 units and Metformin at home. BG's have been elevated at home. The A1c demonstrates that the patient may require an increase in basal insulin at discharge. The patient has been ordered her home dose of 20 units lantus to start this evening. I suspect she may require more. I will review Bg trends and make changes/ recommendations as needed. BG's are better but still elevated. The basal dose has been increased to 30 units Lantus daily and glipizide has been added as well. The changes were discussed with the patient and she is amenable. BG's are stable on the current regimen. Continue. Diabetes management will sign off. Please feel free yo order a new consult if our services are needed.        Signing off         CARLO Day  Diabetes Clinical Nurse Specialist  [facility]  Access via [address] [place] [address]

## 2023-03-27 NOTE — PROGRESS NOTES
Problem: Falls - Risk of  Goal: *Absence of Falls  Description: Document [place] Fall Risk and appropriate interventions in the flowsheet. Outcome: Progressing Towards Goal  Note: Fall Risk Interventions:  Mobility Interventions: Communicate number of staff needed for ambulation/transfer         Medication Interventions: Patient to call before getting OOB    Elimination Interventions: Patient to call for help with toileting needs    History of Falls Interventions: Bed/chair exit alarm, Door open when patient unattended         Problem: Pressure Injury - Risk of  Goal: *Prevention of pressure injury  Description: Document Sergio Scale and appropriate interventions in the flowsheet.   Outcome: Progressing Towards Goal  Note: Pressure Injury Interventions:  Sensory Interventions: Assess changes in LOC         Activity Interventions: Increase time out of bed    Mobility Interventions: Float heels    Nutrition Interventions: Document food/fluid/supplement intake

## 2023-03-27 NOTE — PROGRESS NOTES
Problem: Mobility Impaired (Adult and Pediatric)  Goal: *Acute Goals and Plan of Care (Insert Text)  Description: FUNCTIONAL STATUS PRIOR TO ADMISSION: Patient was independent and active without use of DME.    HOME SUPPORT PRIOR TO ADMISSION: The patient lived alone with local family to provide assistance. Physical Therapy Goals  Initiated 3/22/2023    1. Patient will move from supine to sit and sit to supine  in bed with minimum assistance with bed in flat position within 4 days. 2. Patient will perform sit to stand with stand by assistance within 4 days. 3. Patient will ambulate with minimum assistance  assist for 15 feet with the least restrictive device within 4 days. 4. Patient will ascend/descend 14 stairs with 1 handrail(s) with modified independence within 4 days. 5. Patient will verbalize and demonstrate understanding of L LE NWB precautions per protocol within 4 days. Outcome: Progressing Towards Goal   PHYSICAL THERAPY TREATMENT  Patient: Florina Eli (1 y.o. female)  Date: 3/27/2023  Diagnosis: Closed left ankle fracture [S82.892A] <principal problem not specified>  Procedure(s) (LRB):  OPEN REDUCTION INTERNAL FIXATION LEFT LATERAL MALLEOLUS, OPEN REDUCTION INTERNAL FIXATION SYNDESMOSIS AND LATERAL LIGAMENT RECONSTRUCTION(GENERAL WITH POPLITEAL AND ADDUCTOR BLOCK) (Left) 6 Days Post-Op  Precautions: Fall, NWB  Chart, physical therapy assessment, plan of care and goals were reviewed. ASSESSMENT  Patient continues with skilled PT services and is progressing towards goals. Pt continues to move well overall with therapy services supervision-contact guard however limited in activity tolerance, with decreased endurance, and overall impaired functional mobility secondary to her NWB status. Pt has good carryover of education able to transfer sit<>stand with use of RW.  Pt ambulates to the bathroom CGA with RW and able to perform toileting care and agreeable to ambulate an additional 30ft with hop-to gait pattern. Gait distance limited this session due to her decreased endurance, increased UE fatigue, and increased ankle pain this date. Given her decreased endurance, impaired balance, and limited support at home pt would continue to benefit from additional skilled PT services. SNF remains appropriate. Current Level of Function Impacting Discharge (mobility/balance): supervision with transfers, CGA with RW for gait    Other factors to consider for discharge: lives alone, NWB LLE, at increased risk of falls, below baseline - independent PLOF         PLAN :  Patient continues to benefit from skilled intervention to address the above impairments. Continue treatment per established plan of care. to address goals. Recommendation for discharge: (in order for the patient to meet his/her long term goals)  Therapy up to 5 days/week in SNF setting    This discharge recommendation:  Has been made in collaboration with the attending provider and/or case management    IF patient discharges home will need the following DME: shower chair, rolling walker, and wheelchair     SUBJECTIVE:   Patient stated it's not my favorite part of the day, but I like the pep talk you all do, thank you.     OBJECTIVE DATA SUMMARY:   Critical Behavior:  Neurologic State: Alert, Appropriate for age  Orientation Level: Oriented X4  Cognition: Appropriate decision making, Appropriate for age attention/concentration, Appropriate safety awareness  Safety/Judgement: Awareness of environment  Functional Mobility Training:  Bed Mobility:  Rolling:  (pt was seated upright upon arrival)    Transfers:  Sit to Stand: Supervision  Stand to Sit: Supervision    Balance:  Sitting: Intact  Standing: Intact; With support  Standing - Static: Good;Constant support  Standing - Dynamic : Good;Constant support  Ambulation/Gait Training:  Distance (ft): 50 Feet (ft)  Assistive Device: Gait belt;Walker, rolling  Ambulation - Level of Assistance: Contact guard assistance    Gait Abnormalities: Decreased step clearance (hop-to gait)     Base of Support: Shift to right     Speed/Dominique: Pace decreased (<100 feet/min)  Step Length: Right shortened (NWB LLE)    Pain Rating:  Pt did not quantify however continues to endorse increased ankle pain    Activity Tolerance:   Fair    After treatment patient left in no apparent distress:   Sitting in chair, Heels elevated for pressure relief, and Call bell within reach    COMMUNICATION/COLLABORATION:   The patients plan of care was discussed with: Registered nurse and Case management.      Thompson Sandifer, PTA   Time Calculation: 11 mins

## 2023-03-27 NOTE — OP NOTES
OPERATIVE REPORT     PATIENT:  Koby Bah                                        MRN:  [id number]  :  1975  ADMITTING PHYSICIAN:  Elizabeth Colon MD  ______________________________________________________________________     DATE OF SURGERY:  3/21/2023  SURGEON:  Elizabeth Colon MD        ASSISTANT:  Surg Asst-1: Efren Yang     PREOPERATIVE DIAGNOSIS(ES):  LEFT LATERAL MALLEOLUS FRACTURE  UNCONTROLLED TYPE II DIABETES WITH NEUROPATHY     POSTOPERATIVE DIAGNOSIS(ES):  LEFT LATERAL MALLEOLUS FRACTURE  UNCONTROLLED TYPE II DIABETES WITH NEUROPATHY  CHRONIC LATERAL ANKLE INSTABILITY     PROCEDURE(S) PERFORMED:  ORIF LATERAL MALLEOLUS (CPT CODE 22138)  ORIF SYNDESMOSIS (CPT CODE 91175)  LATERAL LIGAMENT RECONSTRUCTION (CPT CODE 66848)     FINDINGS: Evidence of severe chronic lateral ankle instability. ~ 50% anterior subluxation of the talus at the tibiotalar joint and instability with varus stress noted on intraoperative fluoroscopy. ANESTHESIA:   General.     TOURNIQUET TIME:    Total Tourniquet Time Documented:  Thigh (Left) - 12 minutes  Thigh (Left) - 109 minutes  Total: Thigh (Left) - 121 minutes       IMPLANTS:    Implant Name Type Inv. Item Serial No.  Lot No. LRB No. Used Action   SCREW BNE L20MM DIA3.5MM NONLOCKING FOR R3CON PLATING SYS - SN/A  SCREW BNE L20MM DIA3.5MM NONLOCKING FOR R3CON PLATING SYS N/A PARAGON 28_WD N/A Left 1 Implanted   PLATE BONE 11 H FIBULAR SAPNA ANK FX GORILLA - SN/A  PLATE BONE 11 H FIBULAR SAPNA ANK FX GORILLA N/A PARAGON 28_WD N/A Left 1 Implanted   SCREW BNE L12MM DIA3. 5MM R3CON BURAK PLT GORILLA PLATING SYS - SN/A  SCREW BNE L12MM DIA3. 5MM R3CON BURAK PLT GORILLA PLATING SYS N/A PARAGON 28_WD N/A Left 1 Implanted   SCREW BNE L14MM DIA3.5MM NONLOCKING FOR R3CON PLATING SYS - SN/A  SCREW BNE L14MM DIA3.5MM NONLOCKING FOR R3CON PLATING SYS N/A PARAGON 28_WD N/A Left 3 Implanted   SCREW BNE L10MM DIA3. 5MM BURAK FOR R3CON PLATING SYMARGA TY - SN/A  SCREW BNE L10MM DIA3. 5MM BURAK FOR R3CON PLATING SYS GORILLA N/A PARAGON 28_WD N/A Left 1 Implanted   SCREW BNE NLCK 3.5X52 MM FOR PLATE BABY GORILLA [id number] - SN/A  SCREW BNE NLCK 3.5X52 MM FOR PLATE BABY GORILLA [id number] N/A PARAGON 28_WD N/A Left 1 Implanted   SCREW BNE L50MM [id number] NONLOCKING GORILLA PLATING SYS R3CON - SN/A  SCREW BNE L50MM [id number] NONLOCKING GORILLA PLATING SYS R3CON N/A PARAGON 28_WD N/A Left 1 Implanted   SCREW BNE NLCK 3.5X58 MM FOR PLATE BABY GORILLA [id number] - SN/A  SCREW BNE NLCK 3.5X58 MM FOR PLATE BABY GORILLA [id number] N/A PARAGON 28_WD N/A Left 1 Implanted   DX FIBERTAK SUTURE ANCHOR #2 MTS WITH NDL - [id number]  DX FIBERTAK SUTURE ANCHOR #2 MTS WITH NDL [id number] [id number] Left 1 Implanted   DX FIBERTAK SUTURE ANCHOR #2 MTS WITH NDL - [id number]  DX FIBERTAK SUTURE ANCHOR #2 MTS WITH NDL [id number] [id number] Left 1 Implanted   INTERNAL BRACE IMPLANT SYSTEM LIGAMENT AUGMENTATION REPAIR, BIOCOMPOSITE with JUMPSTART DRESSING   [id number] [id number] Left 1 Implanted          INDICATIONS FOR THE PROCEDURE:  The patient is a 48-year-old female who sustained an injury to her left ankle resulting in a displaced Ordonez B distal fibula fracture. She has a history of uncontrolled type 2 diabetes and had loss of protective sensation to 5.07 monofilament testing on her initial exam.  We discussed the risks and benefits of non operative verses surgical fixation. Given the displacement of the fracture as well as the neuropathy, I felt that this was something that would likely benefit from surgical fixation. After the discussion, she elected to proceed with surgery. DESCRIPTION OF PROCEDURE:    The correct patient, extremity, and operation were all identified in the preoperative holding area. I marked the operative extremity. A block was administered by the anesthesia team and the patient was taken back to the operating room and placed supine on the operating room table. General anesthesia was then induced. All bony prominences were well-padded. A tourniquet was applied to the operative extremity. The operative extremity was then prepped and draped in the usual sterile fashion. A preoperative time-out was called and again the correct patient operation and extremity were all identified, preoperative antibiotics were given IV within 30 minutes of the incision, all parties were in agreement and we proceeded with the operation. An Esmarch was used to exsanguinate the lower extremity and the tourniquet was inflated. A longitudinal incision was made along the lateral border of the distal fibula. Sharp dissection was carried out through the skin and superficial tissues. Scissor dissection was performed in a layered fashion down the level of the distal fibula. We identified the fibula fracture. The fracture was gently debrided of hematoma. We irrigated the fracture site as well as the ankle joint to remove any loose debris. Reduction clamps were then used to reduce and temporarily stabilize the fracture. We confirmed the reduction on orthogonal fluoroscopic views and were satisfied. A 3.5 mm lag screw was inserted in a lag by technique fashion. This screw achieved acceptable bony purchase and was able to stabilize the fracture in its position, however distally, she had fairly soft bone. The reduction clamps were left in place. We then selected a paragon 11 hole anatomic plate and positioned this along the lateral border of the fibula. We confirmed the position of the plate on orthogonal fluoroscopic views and adjusted until we were satisfied. We then secured the plate distally with locking screws and proximally with nonlocking screws. Given the diabetes with neuropathy, we elected to place several syndesmotic screws as well. the syndesmosis was manually held in a reduced position. We confirmed the reduction both clinically and radiographically.   Several syndesmotic screws were then placed across the syndesmosis under fluoroscopic guidance. Following insertion of the syndesmotic screws, I had adjusted the bump to be placed directly under the heel for lateral imaging. With this, I then noted approximately 50% anterior translation of the talus at the tibiotalar joint suggestive of an unstable lateral ankle ligaments. She also was fairly unstable to varus stress. This was confirmed radiographically as well. I reassessed the reduction of the syndesmosis to ensure this was not contributing. I felt that we had a good reduction. However, I elected to remove the syndesmotic screws and re-examine the stability of the ankle. Again, I noted significant instability with anterior drawer with approximately 50% anterior translation of the talus at the tibiotalar joint and continued varus instability with the lateral ligaments. Given this, I was concerned about injury to the lateral ankle ligaments and did not feel that this was due to about reduction of her syndesmosis. Therefore, again, with the syndesmosis manually held in a reduced fashion, the syndesmotic screws were reinserted under fluoroscopic guidance. I then turned my attention to lateral ankle ligament reconstruction. The incision was extended in a curvilinear fashion from the tip of the fibula towards the base of the 4th metatarsal for exposure of the more proximal sinus tarsi approach. Sharp dissection was carried out through the skin and superficial tissues. Scissor dissection was performed in a layered fashion. We identified the extensor retinaculum. This was preserve for later advancement. I then identified the lateral ligaments and capsular structures. I did not notice any hematoma or rupture of the ligament suggestive of an acute injury. However, this area was very thin and attenuated suggestive of chronic ankle instability.   The thin capsuloligamentous structures laterally were then incised from the anterior distal aspect of the distal fibula and curved towards the CFL. There were really no distinct areas of thickening of the capsule where the ATFL or CFL ligaments typically reside. The tissue overall was fairly thin and of poor quality. Given this, I elected to proceed with a lateral ligament reconstruction with planned augmentation with an internal brace. We 1st inserted to Arthrex FiberTak sutures into the distal fibula. A guidewire was drilled into the distal fibula, 1 near the estimated insertion of the ATFL and 1 at the tip of the fibula near the CFL. We did have to alter the position slightly given the screw placement of the distal fibula in the planned bone tunnel for the internal brace. The FiberTak for then inserted into the distal fibula. These that she has satisfactory bony purchase and did not pull out with gentle traction. We then placed a guidewire for the internal brace into the talus. We confirmed the position of the guidewire on orthogonal fluoroscopic views to ensure that we are out of the sinus tarsi and subtalar joint. We adjusted the position of the guidewire until we were satisfied. The guidewire was then over drilled, the bone tunnel was tapped, the internal brace was then inserted into the talus. We then drilled the fibula and tapped for the SwiveLock anchor for the internal brace. The sutures from the FiberTak sutures were then passed through the capsular ligamentous structures. These replaced fairly distal in an effort for us to advance the lateral capsule ligamentous structures for increased stability. The bump was repositioned under the calf to ensure that there was no anterior force underneath the heel simulated in anterior drawer. We manually held the talus in a dorsiflexed and slightly posterior translated position. The sutures from the FiberTak anchors were then tightened.   The FiberTape sutures from the internal brace were then passed through the [facility] anchor and the SwiveLock was then inserted into the distal fibula to reinforce the lateral ligament reconstruction. Following this, we obtained additional stress x-rays including an anterior drawer on a lateral view. This demonstrated much improved stability with anterior drawer. The incisions were then copiously irrigated with sterile saline. The deep fascial layer was closed with 2-0 Vicryl. The subcuticular layer was closed with 3-0 Monocryl and the skin was closed with 3-0 nylon. A sterile dressing was then applied followed by short-leg splint. During placement of the splint, we ensure that there was no anterior directed force at the tibiotalar joint. She was then awoken from general anesthesia and transferred to PACU in stable condition. Of note, upon discussion with family following the surgery, her sister had indicated that she had issues with ankle instability for many many years and always sprains her ankle. This was also confirmed by the patient on postoperative day 1. COMPLICATIONS:  None. POSTOPERATIVE PLAN:  She will be nonweightbearing. Given the amount of instability and her uncontrolled diabetes with neuropathy, we will likely plan for nonweightbearing for 10-12 weeks. She also has a history of Crohn's and is unable to take oral anticoagulation, therefore we will plan for Lovenox for DVT prophylaxis. I will plan to see her back in approximately 2 weeks for repeat evaluation. She should obtain nonweightbearing ankle radiographs at that visit.

## 2023-03-27 NOTE — PROGRESS NOTES
End of Shift Note    Bedside shift change report given to Xiao Goyal (oncoming nurse) by Enrique Doan RN (offgoing nurse). Report included the following information SBAR, Kardex, OR Summary, Intake/Output, MAR, Recent Results, Alarm Parameters , and Quality Measures    Shift worked: 7 am-7 pm   Shift summary and any significant changes:     Patient having 1-2 bowel movements a day r/t Chrons      Concerns for physician to address: The miralax and senna may need to be changed to as needed      Zone phone for oncoming shift:          Activity:  Activity Level: Up with Assistance  Number times ambulated in hallways past shift: 1  Number of times OOB to chair past shift: 3    Cardiac:   Cardiac Monitoring: No      Cardiac Rhythm: Sinus Rhythm    Access:  Current line(s): PIV     Genitourinary:   Urinary status: voiding    Respiratory:   O2 Device: None (Room air)  Chronic home O2 use?: NO  Incentive spirometer at bedside: YES       GI:     Current diet:  ADULT DIET Regular; 4 carb choices (60 gm/meal); please cook veg extra soft if pt orders  Passing flatus: YES  Tolerating current diet: YES       Pain Management:   Patient states pain is manageable on current regimen: YES    Skin:  Sergio Score: 21  Interventions: float heels, increase time out of bed, foam dressing, PT/OT consult, and limit briefs    Patient Safety:  Fall Score:  Total Score: 4  Interventions: bed/chair alarm, assistive device (walker, cane, etc), gripper socks, pt to call before getting OOB, and stay with me (per policy)  High Fall Risk: Yes    Length of Stay:  Expected LOS: 2d 16h  Actual LOS: [address] Leny Wilkinson, RN

## 2023-03-27 NOTE — PROGRESS NOTES
This patient was seen and examined by me personally. I agree with the findings as documented by the NP/PA. Doing well. Pain controlled. Working with PT. 1) PT/OT - NWB x 12 weeks given significant lateral ligament instability noted intra-op and Uncontrolled Type 2 diabetes with neuropathy. 2) Lovenox 30 mg BID for DVT Prophylaxis for 6 weeks  3) Pain control - scheduled tylenol, and prn  oxycodone  5-10 mg q4 hrs. 4) Uncontrolled DM2 -  A1C 9.2 (3/23). BG improving 102-163. Continue Lantus and SSI, 60 gram diabetic diet. 5) Discharge planning - Lives alone in second floor apartment. Medically stable for discharge.    SNF placement, still waiting acceptance to rehab facility, will also need insurance Kevin Brink MD

## 2023-03-27 NOTE — PROGRESS NOTES
Problem: Self Care Deficits Care Plan (Adult)  Goal: *Acute Goals and Plan of Care (Insert Text)  Description: FUNCTIONAL STATUS PRIOR TO ADMISSION: Patient was independent and active without use of DME.     HOME SUPPORT: The patient lived alone with sister to provide assistance. Occupational Therapy Goals  Initiated 3/22/2023  1. Patient will perform grooming with independence within 7 day(s). 2.  Patient will perform upper body dressing with independence within 7 day(s). 3.  Patient will perform lower body dressing with independence within 7 day(s). 4.  Patient will perform toilet transfers with modified independence within 7 day(s). 5.  Patient will perform all aspects of toileting with modified independence within 7 day(s). 6.  Patient will utilize energy conservation techniques during functional activities with verbal cues within 7 day(s). Outcome: Progressing Towards Goal   OCCUPATIONAL THERAPY TREATMENT  Patient: Verner Guarneri (56 y.o. female)  Date: 3/27/2023  Diagnosis: Closed left ankle fracture [S82.892A] <principal problem not specified>  Procedure(s) (LRB):  OPEN REDUCTION INTERNAL FIXATION LEFT LATERAL MALLEOLUS, OPEN REDUCTION INTERNAL FIXATION SYNDESMOSIS AND LATERAL LIGAMENT RECONSTRUCTION(GENERAL WITH POPLITEAL AND ADDUCTOR BLOCK) (Left) 6 Days Post-Op  Precautions: Fall, NWB  Chart, occupational therapy assessment, plan of care, and goals were reviewed. ASSESSMENT  Patient continues with skilled OT services and is progressing towards goals. Pt was agreeable to treatment and is managing her non WB status during adls quite well. BSC is placed over the toilet and pt using the RW with S/CGA/stand by assistance for toilet transfer, clothing management and standing grooming in the bathroom this date. Suggested a reacher or dressing stick could  be placed at the toilet for ease in clothing management prn.   Pt has a shoe present, but the shoe does not allow her to turn (sole of shoe) during the small space of toilet transfer and she prefers the gripper socks. (Having a shoe on would help decrease the impact on her RLE). Pt was educated in 2 red theraband exercises this session to increase strength in B UEs/Shoulder girdle for using the RW during adls and adl mobility. Pt reports that she is planning rehab admission and will likely be non WB in LLE for months. Pt verbalized and demonstrated good knowledge of safety and adaptive techniques during adls this session. Pt is planning discharge to rehab tomorrow. Current Level of Function Impacting Discharge (ADLs): supervision/CGA/stand by assistance for adls. Other factors to consider for discharge: lives alone; independent adls and baseline         PLAN :  Patient continues to benefit from skilled intervention to address the above impairments. Continue treatment per established plan of care to address goals. Recommend with staff: continue to encourage participation in [address] [place] [address] next OT session: POC, reinforce theraband program    Recommendation for discharge: (in order for the patient to meet his/her long term goals)  Therapy up to 5 days/week in SNF setting    This discharge recommendation:  Has not yet been discussed the attending provider and/or case management    IF patient discharges home will need the following DME: tbd in rehab       SUBJECTIVE:   Patient was cooperative and demonstrates safety awareness. OBJECTIVE DATA SUMMARY:   Cognitive/Behavioral Status:  Neurologic State: Alert; Appropriate for age  Orientation Level: Appropriate for age  Cognition: Appropriate decision making; Appropriate for age attention/concentration; Appropriate safety awareness; Follows commands  Perception: Appears intact  Perseveration: No perseveration noted  Safety/Judgement: Awareness of environment; Fall prevention    Functional Mobility and Transfers for ADLs:  Bed Mobility:  Rolling:  (pt was seated in chair)    Transfers:  Sit to Stand: Supervision  Functional Transfers  Bathroom Mobility: Stand-by assistance;Supervision/set up;Contact guard assistance  Toilet Transfer : Supervision  Adaptive Equipment: Walker (comment) (BSC placed over toilet)       Balance:  Sitting: Intact  Standing: Intact; With support  Standing - Static: Good;Constant support  Standing - Dynamic : Good;Constant support    ADL Intervention:       Grooming  Grooming Assistance: Supervision  Position Performed: Standing (at sink)  Washing Hands: Supervision                        Lower Body Dressing Assistance  Dressing Assistance:  (recommend reacher placed at toilet to assist w clothing management prn)  Underpants: Stand-by assistance  Pants With Elastic Waist: Stand-by assistance  Socks: Stand-by assistance    Toileting  Toileting Assistance: Modified independent;Stand-by assistance  Clothing Management: Stand-by assistance  Adaptive Equipment: Walker (BSC placed over toilet)    Cognitive Retraining  Safety/Judgement: Awareness of environment; Fall prevention    Therapeutic Exercises:   Issued red theraband. Suggested 5 to 10 reps each exercise and increase repetitions or decrease length of band during reps. B horizontal ABD/ADDuction. Unilateral ABD/ADDuction  Use of full water pitcher for biceps curls. Will add onto program as appropriate    Pain:  No complaints of pain    Activity Tolerance:   Good. After treatment patient left in no apparent distress:   Sitting in chair, Heels elevated for pressure relief, Call bell within reach, and nurse informed    COMMUNICATION/COLLABORATION:   The patients plan of care was discussed with: Registered nurse.      NICK Pinto/L  Time Calculation: 21 mins

## 2023-03-28 VITALS
HEIGHT: 69 IN | BODY MASS INDEX: 41.21 KG/M2 | WEIGHT: 278.22 LBS | TEMPERATURE: 97.7 F | HEART RATE: 90 BPM | DIASTOLIC BLOOD PRESSURE: 70 MMHG | RESPIRATION RATE: 16 BRPM | SYSTOLIC BLOOD PRESSURE: 141 MMHG | OXYGEN SATURATION: 98 %

## 2023-03-28 LAB
GLUCOSE BLD STRIP.AUTO-MCNC: 125 MG/DL (ref 65–117)
GLUCOSE BLD STRIP.AUTO-MCNC: 90 MG/DL (ref 65–117)
GLUCOSE BLD STRIP.AUTO-MCNC: 97 MG/DL (ref 65–117)
SARS-COV-2 RDRP RESP QL NAA+PROBE: NOT DETECTED
SERVICE CMNT-IMP: ABNORMAL
SERVICE CMNT-IMP: NORMAL
SERVICE CMNT-IMP: NORMAL
SOURCE, COVRS: NORMAL

## 2023-03-28 PROCEDURE — 74011250636 HC RX REV CODE- 250/636: Performed by: STUDENT IN AN ORGANIZED HEALTH CARE EDUCATION/TRAINING PROGRAM

## 2023-03-28 PROCEDURE — 87635 SARS-COV-2 COVID-19 AMP PRB: CPT

## 2023-03-28 PROCEDURE — 74011250637 HC RX REV CODE- 250/637: Performed by: STUDENT IN AN ORGANIZED HEALTH CARE EDUCATION/TRAINING PROGRAM

## 2023-03-28 PROCEDURE — 97116 GAIT TRAINING THERAPY: CPT

## 2023-03-28 PROCEDURE — 82962 GLUCOSE BLOOD TEST: CPT

## 2023-03-28 PROCEDURE — 94760 N-INVAS EAR/PLS OXIMETRY 1: CPT

## 2023-03-28 PROCEDURE — 74011250637 HC RX REV CODE- 250/637: Performed by: PHYSICIAN ASSISTANT

## 2023-03-28 PROCEDURE — 74011000250 HC RX REV CODE- 250: Performed by: STUDENT IN AN ORGANIZED HEALTH CARE EDUCATION/TRAINING PROGRAM

## 2023-03-28 PROCEDURE — 74011250637 HC RX REV CODE- 250/637

## 2023-03-28 RX ADMIN — Medication 1 TABLET: at 11:59

## 2023-03-28 RX ADMIN — ENOXAPARIN SODIUM 30 MG: 100 INJECTION SUBCUTANEOUS at 09:16

## 2023-03-28 RX ADMIN — SODIUM CHLORIDE, PRESERVATIVE FREE 10 ML: 5 INJECTION INTRAVENOUS at 06:02

## 2023-03-28 RX ADMIN — GLIPIZIDE 5 MG: 5 TABLET ORAL at 18:18

## 2023-03-28 RX ADMIN — THERA TABS 1 TABLET: TAB at 09:23

## 2023-03-28 RX ADMIN — MESALAMINE 1600 MG: 400 CAPSULE, DELAYED RELEASE ORAL at 09:22

## 2023-03-28 RX ADMIN — SODIUM CHLORIDE, PRESERVATIVE FREE 10 ML: 5 INJECTION INTRAVENOUS at 13:27

## 2023-03-28 RX ADMIN — Medication 1 TABLET: at 18:18

## 2023-03-28 RX ADMIN — LISINOPRIL 10 MG: 10 TABLET ORAL at 09:21

## 2023-03-28 RX ADMIN — Medication 1 TABLET: at 09:22

## 2023-03-28 RX ADMIN — METFORMIN HYDROCHLORIDE 1000 MG: 500 TABLET, EXTENDED RELEASE ORAL at 09:21

## 2023-03-28 RX ADMIN — AZATHIOPRINE 100 MG: 50 TABLET ORAL at 09:22

## 2023-03-28 RX ADMIN — METFORMIN HYDROCHLORIDE 1000 MG: 500 TABLET, EXTENDED RELEASE ORAL at 18:18

## 2023-03-28 RX ADMIN — GLIPIZIDE 5 MG: 5 TABLET ORAL at 07:35

## 2023-03-28 RX ADMIN — Medication 1 AMPULE: at 09:22

## 2023-03-28 RX ADMIN — PANTOPRAZOLE SODIUM 40 MG: 40 TABLET, DELAYED RELEASE ORAL at 09:22

## 2023-03-28 NOTE — PROGRESS NOTES
Transition of Care Plan:  RUR: 7%  Disposition: SNF- [facility]- call report   If SNF or IPR: Date FOC offered: 3/22  Date [address] received: 3/23  Date authorization started with reference number: 3/27  Date authorization received and expires:  Accepting facility: [facility]  Follow up appointments: ortho  DME needed: n/a  Transportation at Discharge: family  [place] or means to access home:  n/a      IM Medicare Letter: n/a  Caregiver Contact: sister Beto Capone [phone]  Discharge Caregiver contacted prior to discharge? yes  Care Conference needed?: no     Update  Insurance auth obtained. [address] [place] [address] is able to accept patient today. CM made room visit with patient who reported she would reach out to family/friends to see who could transport patient this evening. [address] [place] [address] is ready for patient any time. Patient to let RN know what time family will arrive. Initial note  CM spoke with Bambi in admissions at [address] [place] [address] who reported Garret Soriano is still pending but insurance did request additional updated notes which she has already sent. Transition of Care Plan to SNF/Rehab    SNF/Rehab Transition:  Patient has been accepted to [address] [place] [address] and meets criteria for admission. Patient will transported by family and expected to leave at [facility]. Communication to Patient/Family:  Met with patient and she is agreeable to the transition plan. Communication to SNF/Rehab:  [facility]side [facility], has been notified to update the transition plan to the facility and call report (phone number [phone]). Discharge information has been updated on the AVS.     Discharge instructions to be fax'd to facility at [facility] # [phone]). Nursing Please include all hard scripts for controlled substances, med rec and dc summary, and AVS in packet.      Reviewed and confirmed with facility, [address] [place] [address], can manage the patient care needs for the following:     Latrell Nguyen with (X) only those applicable:    Medication:  [x] Medications will be available at the facility  []  IV Antibiotics   [x]  Controlled Substance - hard copy to be sent with patient   []  Weekly Labs   Documents:  [x] Hard RX  [x] MAR  [x] Kardex  [x] AVS  [x]Transfer Summary  [x]Discharge   Equipment:  []  CPAP/BiPAP  []  Wound Vacuum  []  Mai or Urinary Device  []  PICC/Central Line  []  Nebulizer  []  Ventilator   Treatment:  []Isolation (for MRSA, VRE, etc.)  []Surgical Drain Management  []Tracheostomy Care  []Dressing Changes  []Dialysis with transportation and chair time. []PEG Care  []Oxygen  []Daily Weights for Heart Failure   Dietary:  []Any diet limitations  []Tube Feedings   []Total Parenteral Management (TPN)   Eligible for Medicaid Long Term Services and Supports  Yes:  [] Eligible for medical assistance or will become eligible within 180 days and UAI completed. [] Provider/Patient and/or support system has requested screening. [] UAI copy provided to patient or responsible party  [] UAI unavailable at discharge will send once processed to SNF provider. [] UAI unavailable at discharged mailed to patient  No:   [] Private pay and is not financially eligible for Medicaid within the next 180 days. [] Reside out-of-state.   [] A residents of a state owned/operated facility that is licensed  by [address] and [employer] or [facility]  [] Enrollment in [facility] hospice services  [] [address] [place] [address] [address]  [] Patient /Family declines to have screening completed or provide financial information for screening     Financial Resources:  Medicaid    [] Initiated and application pending   [] Full coverage     Advanced Care Plan:  []Surrogate Decision Maker of Care  []POA  [x]Communicated Code Status full   Other         [facility] [address] [facility], [address]

## 2023-03-28 NOTE — PROGRESS NOTES
Noted. Report has not been amended yet. Problem: Mobility Impaired (Adult and Pediatric)  Goal: *Acute Goals and Plan of Care (Insert Text)  Description: FUNCTIONAL STATUS PRIOR TO ADMISSION: Patient was independent and active without use of DME.    HOME SUPPORT PRIOR TO ADMISSION: The patient lived alone with local family to provide assistance. Physical Therapy Goals  Reassessment 3/28/23  1. Patient will move from supine to sit and sit to supine in bed with independence with bed in flat position within 7 days. 2. Patient will perform sit to stand with modified independence within 7 days. 3. Patient will ambulate with modified independence for 75 feet with the least restrictive device within 7 days. 4. Patient will ascend/descend 14 stairs with 1 handrail(s) with modified independence within 7 days. Initiated 3/22/2023  1. Patient will move from supine to sit and sit to supine  in bed with minimum assistance with bed in flat position within 4 days. MET 3/25  2. Patient will perform sit to stand with stand by assistance within 4 days. MET 3/28  3. Patient will ambulate with minimum assistance  assist for 15 feet with the least restrictive device within 4 days. - MET 3/28  4. Patient will ascend/descend 14 stairs with 1 handrail(s) with modified independence within 4 days. 5. Patient will verbalize and demonstrate understanding of L LE NWB precautions per protocol within 4 days. MET 3/28  Outcome: Progressing Towards Goal     PHYSICAL THERAPY TREATMENT: WEEKLY REASSESSMENT  Patient: Yeimi Ewing (10 y.o. female)  Date: 3/28/2023  Primary Diagnosis: Closed left ankle fracture [S82.892A]  Procedure(s) (LRB):  OPEN REDUCTION INTERNAL FIXATION LEFT LATERAL MALLEOLUS, OPEN REDUCTION INTERNAL FIXATION SYNDESMOSIS AND LATERAL LIGAMENT RECONSTRUCTION(GENERAL WITH POPLITEAL AND ADDUCTOR BLOCK) (Left) 7 Days Post-Op   Precautions:  Fall, NWB    ASSESSMENT  Patient continues with skilled PT services and is progressing towards goals.  Demonstrating improved gait quality this date and has met multiple goals since evaluation and goals upgraded. Patient not feeling quite ready to trial stairs this date but progressing towards stairs. She lives alone in second floor apartment and will need SNF to maximize independence prior to discharge home. Acute PT to follow. Did decrease frequency given length of stay. Patient's progression toward goals since last assessment: met multiple goals; goals upgraded    Current Level of Function Impacting Discharge (mobility/balance): unable to complete stairs yet    Other factors to consider for discharge: lives alone, NWB L LE         PLAN :  Goals have been updated based on progression since last assessment. Patient continues to benefit from skilled intervention to address the above impairments. Recommendations and Planned Interventions: bed mobility training, transfer training, gait training, therapeutic exercises, neuromuscular re-education, patient and family training/education, and therapeutic activities      Frequency/Duration: Patient will be followed by physical therapy:  5 times a week to address goals. Recommendation for discharge: (in order for the patient to meet his/her long term goals)  Therapy up to 5 days/week in SNF setting    This discharge recommendation:  Has been made in collaboration with the attending provider and/or case management    IF patient discharges home will need the following DME: rolling walker and wheelchair     SUBJECTIVE:   Patient stated I don't think I am quite ready for the stairs.     OBJECTIVE DATA SUMMARY:   HISTORY:    Past Medical History:   Diagnosis Date    Anemia     Autoimmune disease ([facility] [address]     crohn's disease    Closed left ankle fracture 3/21/2023    Crohn disease ([facility] [address]     Diabetes ([facility] [address]     GERD (gastroesophageal reflux disease)     History of colonoscopy with polypectomy 03/12/2020    Dr. Camargo Serve: Surveillance for UC. Polypectomy (1cm in sigmoid colon)--repeat 3yrs. Hypertension      Past Surgical History:   Procedure Laterality Date    COLONOSCOPY N/A 6/30/2016    COLONOSCOPY performed by Brayden Andrews MD at [facility] ENDOSCOPY    SIGMOIDOSCOPY,BIOPSY  6/30/2016         UPPER GI ENDOSCOPY,BIOPSY  6/30/2016          Personal factors and/or comorbidities impacting plan of care: lives alone, recent PNA    Home Situation  Home Environment: Apartment  # Steps to Enter: 11  One/Two Story Residence: One story  Living Alone: Yes  Support Systems: Other Family Member(s)  Patient Expects to be Discharged to[de-identified] Skilled nursing facility  Current DME Used/Available at Home: Shower chair  Tub or Shower Type: Tub/Shower combination    EXAMINATION/PRESENTATION/DECISION MAKING:   Critical Behavior:  Neurologic State: Alert, Appropriate for age  Orientation Level: Oriented X4  Cognition: Appropriate decision making, Appropriate safety awareness, Follows commands  Safety/Judgement: Awareness of environment, Fall prevention  Hearing: Auditory  Auditory Impairment: None  Hearing Aids/Status: Does not own  Skin:  bulky splint intact to L LE    Functional Mobility:  Transfers:  Sit to Stand: Supervision  Stand to Sit: Supervision                       Balance:   Sitting: Intact  Standing: Intact; With support  Standing - Static: Good;Constant support  Standing - Dynamic : Good;Constant support  Ambulation/Gait Training:  Distance (ft): 50 Feet (ft)  Assistive Device: Gait belt;Walker, rolling  Ambulation - Level of Assistance: Contact guard assistance        Gait Abnormalities: Decreased step clearance (hop to gait)     Left Side Weight Bearing: Non-weight bearing  Base of Support: Shift to right     Speed/Dominique: Pace decreased (<100 feet/min)  Step Length: Right shortened (NWB L LE)         Therapeutic Exercises:   Seated long arc quads with hold     Pain Rating:  Did not interfere    Activity Tolerance:   Fair    After treatment patient left in no apparent distress:   Sitting in chair and Call bell within reach    COMMUNICATION/EDUCATION:   The patients plan of care was discussed with: Occupational therapist and Registered nurse. Fall prevention education was provided and the patient/caregiver indicated understanding., Patient/family have participated as able in goal setting and plan of care. , and Patient/family agree to work toward stated goals and plan of care.     Thank you for this referral.  Karyle Simmer, PT   Time Calculation: 13 mins

## 2023-03-28 NOTE — PROGRESS NOTES
End of Shift Note    Bedside shift change report given to Pawelia [facility] (oncoming nurse) by Alice Salas RN (offgoing nurse). Report included the following information SBAR, Kardex, Intake/Output, MAR, and Recent Results    Shift worked:  7P-7A     Shift summary and any significant changes:     Pt is alert and orientation. No any pain complained during my shift. Pt voiding well. NWB on left leg. Concerns for physician to address:       Zone phone for oncoming shift:   [phone]       Activity:  Activity Level: Up with Assistance  Number times ambulated in hallways past shift: 0  Number of times OOB to chair past shift: 2    Cardiac:   Cardiac Monitoring: No      Cardiac Rhythm: Sinus Rhythm    Access:  Current line(s): PIV     Genitourinary:   Urinary status: voiding    Respiratory:   O2 Device: None (Room air)  Chronic home O2 use?: NO  Incentive spirometer at bedside: YES       GI:     Current diet:  ADULT DIET Regular; 4 carb choices (60 gm/meal); please cook veg extra soft if pt orders  Passing flatus: YES  Tolerating current diet: YES       Pain Management:   Patient states pain is manageable on current regimen: YES    Skin:  Sergio Score: 20  Interventions: float heels, increase time out of bed, and PT/OT consult    Patient Safety:  Fall Score:  Total Score: 4  Interventions: bed/chair alarm, assistive device (walker, cane, etc), gripper socks, pt to call before getting OOB, and stay with me (per policy)  High Fall Risk: Yes    Length of Stay:  Expected LOS: 2d 16h  Actual LOS: 7      Urbano De Dios RN

## 2023-03-28 NOTE — DISCHARGE SUMMARY
Ortho Discharge Summary    Patient ID:  Hannah Higuera  [id number]  female  49 y.o.  1975    Admit date: 3/21/2023    Discharge date: 3/28/2023    Admitting Physician: Henrique Delgado MD     Consulting Physician(s):   Treatment Team: Attending Provider: Alexandro Hernandez MD; Utilization Review: Bipin Cazares; Care Manager: Mikael Moore Primary Nurse: Hallie Gallardo; Occupational Therapist: NICK Grace/VERONICA; Physical Therapist: Jeremy Jeter PT    Date of Surgery:   3/21/2023     Preoperative Diagnosis:  LEFT LATERAL MALLEOLUS FRACTURE WITH UNCONTROLLED TYPE II DIABETES    Postoperative Diagnosis:   LEFT LATERAL MALLEOLUS FRACTURE WITH UNCONTROLLED TYPE II DIABETES    Procedure(s):   OPEN REDUCTION INTERNAL FIXATION LEFT LATERAL MALLEOLUS, OPEN REDUCTION INTERNAL FIXATION SYNDESMOSIS AND LATERAL LIGAMENT RECONSTRUCTION(GENERAL WITH POPLITEAL AND ADDUCTOR BLOCK)     Anesthesia Type:   General     Surgeon: Alexandro Hernandez MD                            HPI:  Pt is a 49 y.o. female who has a history of LEFT LATERAL MALLEOLUS FRACTURE WITH UNCONTROLLED TYPE II DIABETES  with pain and limitations of activities of daily living who presents at this time for a OPEN REDUCTION INTERNAL FIXATION LEFT LATERAL MALLEOLUS, OPEN REDUCTION INTERNAL FIXATION SYNDESMOSIS AND LATERAL LIGAMENT RECONSTRUCTION(GENERAL WITH POPLITEAL AND ADDUCTOR BLOCK) following the failure of conservative management. PMH:   Past Medical History:   Diagnosis Date    Anemia     Autoimmune disease ([facility] [address]     crohn's disease    Closed left ankle fracture 3/21/2023    Crohn disease ([facility] [address]     Diabetes ([facility] [address]     GERD (gastroesophageal reflux disease)     History of colonoscopy with polypectomy 03/12/2020    Dr. Moura Snare: Surveillance for UC. Polypectomy (1cm in sigmoid colon)--repeat 3yrs. Hypertension        Body mass index is 41.09 kg/m². : A BMI > 30 is classified as obesity and > 40 is classified as morbid obesity. Medications upon admission :   Prior to Admission Medications   Prescriptions Last Dose Informant Patient Reported? Taking? BD Ultra-Fine Short Pen Needle 31 gauge x \" ndle   Yes No   Sig: See Admin Instructions. acetaminophen (TYLENOL) 500 mg tablet 3/20/2023  Yes Yes   Sig: Take 1,000 mg by mouth every six (6) hours as needed for Pain.   azaTHIOprine 100 mg tab 3/21/2023 at 0730  Yes Yes   Si mg daily. cetirizine (ZYRTEC) 10 mg tablet 3/20/2023  Yes Yes   Sig: Take  by mouth daily as needed. insulin glargine (LANTUS) 100 unit/mL injection 3/20/2023 at 2030  Yes Yes   Sig: by SubCUTAneous route nightly. lisinopriL (PRINIVIL, ZESTRIL) 10 mg tablet 3/21/2023 at 0730  No Yes   Sig: Take 1 tablet by mouth once daily   mesalamine ER (APRISO) 0.375 gram 24 hour capsule 3/21/2023 at 0730  Yes Yes   Sig: Take 1.5 g by mouth daily. metFORMIN ER (GLUCOPHAGE XR) 500 mg tablet 3/20/2023  No Yes   Sig: TAKE 2 TABLETS BY MOUTH TWICE DAILY WITH MEALS   multivitamins chew 3/20/2023  Yes Yes   Sig: Take  by mouth daily. pantoprazole (PROTONIX) 40 mg tablet 3/21/2023 at 0600  Yes Yes   Sig: Take 40 mg by mouth daily. Facility-Administered Medications: None        Allergies:  No Known Allergies     Hospital Course: The patient underwent surgery. Complications:  None; patient tolerated the procedure well. Was taken to the PACU in stable condition and then transferred to the ortho floor. Perioperative Antibiotics:  Ancef     Postoperative Pain Management:  Oxycodone & Tylenol     DVT Prophylaxis: Lovenox    Postoperative transfusions:    Number of units banked PRBCs =   none     Post Op complications: none    Hemoglobin at discharge:    Lab Results   Component Value Date/Time    HGB 10.7 (L) 2023 04:08 AM       Dressing remained clean, dry and intact. No significant erythema or swelling. Wound appears to be healing without any evidence of infection.  Neurovascular exam found to be within normal limits. Physical Therapy started following surgery and participated in bed mobility, transfers and ambulation. Gait:  Gait  Base of Support: Shift to right  Speed/Dominique: Pace decreased (<100 feet/min)  Step Length: Right shortened (NWB L LE)  Gait Abnormalities: Decreased step clearance (hop to gait)  Ambulation - Level of Assistance: Contact guard assistance  Distance (ft): 50 Feet (ft)  Assistive Device: Gait belt, Walker, rolling                   Discharged to: [facility]. Condition on Discharge:   stable    Discharge instructions:  - Anticoagulate with Lovenox for 6 weeks   - Take pain medications as prescribed  - Resume pre hospital diet      - Discharge activity: activity as tolerated  - Ambulate with assistive device as needed. - Weight bearing status - NWB of RLE   - Wound Care Keep wound clean and dry. See discharge instruction sheet. -DISCHARGE MEDICATION LIST     Current Discharge Medication List        START taking these medications    Details   calcium-vitamin D (OS-DENIS +D3) 500 mg-200 unit per tablet Take 1 Tablet by mouth three (3) times daily (with meals) for 90 days. Qty: 90 Tablet, Refills: 2  Start date: 3/23/2023, End date: 6/21/2023      oxyCODONE IR (ROXICODONE) 5 mg immediate release tablet Take 1-2 Tablets by mouth every four (4) hours as needed for Pain for up to 7 days. Max Daily Amount: 60 mg. One tab for mild to moderate pain level 1-6, or 2 tabs for severe pain level 7-10  Qty: 30 Tablet, Refills: 0  Start date: 3/23/2023, End date: 3/30/2023    Associated Diagnoses: S/P ORIF (open reduction internal fixation) fracture      enoxaparin (LOVENOX) 30 mg/0.3 mL injection 0.3 mL by SubCUTAneous route every twelve (12) hours for 40 days. 6 weeks postop, end date 5/2/23.   Qty: 24 mL, Refills: 0  Start date: 3/23/2023, End date: 5/2/2023           CONTINUE these medications which have NOT CHANGED    Details   acetaminophen (TYLENOL) 500 mg tablet Take 1,000 mg by mouth every six (6) hours as needed for Pain. insulin glargine (LANTUS) 100 unit/mL injection by SubCUTAneous route nightly. pantoprazole (PROTONIX) 40 mg tablet Take 40 mg by mouth daily. lisinopriL (PRINIVIL, ZESTRIL) 10 mg tablet Take 1 tablet by mouth once daily  Qty: 90 Tablet, Refills: 1    Associated Diagnoses: Essential hypertension; Type 2 diabetes mellitus without complication, without long-term current use of insulin (HCC)      metFORMIN ER (GLUCOPHAGE XR) 500 mg tablet TAKE 2 TABLETS BY MOUTH TWICE DAILY WITH MEALS  Qty: 360 Tablet, Refills: 1      mesalamine ER (APRISO) 0.375 gram 24 hour capsule Take 1.5 g by mouth daily. cetirizine (ZYRTEC) 10 mg tablet Take  by mouth daily as needed. multivitamins chew Take  by mouth daily. azaTHIOprine 100 mg tab 100 mg daily. BD Ultra-Fine Short Pen Needle 31 gauge x 5/16\" ndle See Admin Instructions.             per medical continuation form      -Follow up in office in 2 weeks      Signed:  Khloe Arias NP  Orthopaedic Nurse Practitioner    3/28/2023  3:30 PM

## 2023-03-28 NOTE — PROGRESS NOTES
[facility] to [address] Kaitlyn Walters                                                                        49 y.o.   female    [address]   Room: 108/01    [facility] ORTHOPEDICS  Unit Phone# :  [phone]      [facility]   [place] [address]  Dept: [phone]  Loc: [phone]                    SITUATION     Admitted:  3/21/2023         Attending Provider:  Huong Ledezma MD       Consultations:  None    PCP:  Jyotsna Jefferson MD   [phone]    Treatment Team: Attending Provider: Huong Ledezma MD; Utilization Review: Cristela Caro; Care Manager: Bushra Michael Primary Nurse: Sheffield Romberg    Admitting Dx:  Closed left ankle fracture [Y05.160O]       Principal Problem: <principal problem not specified>    7 Days Post-Op of   Procedure(s):  OPEN REDUCTION INTERNAL FIXATION LEFT LATERAL MALLEOLUS, OPEN REDUCTION INTERNAL FIXATION SYNDESMOSIS AND LATERAL LIGAMENT RECONSTRUCTION(GENERAL WITH POPLITEAL AND ADDUCTOR BLOCK)   BY: Huong Ledezma MD             ON: 3/21/2023                  Code Status: Full Code                Advance Directives:   Advance Care Planning 3/17/2023   Patient's Healthcare Decision Maker is: -   Primary Decision Maker Name -   Primary Decision Maker Phone Number -   Primary Decision Maker Relationship to Patient -   Confirm Advance Directive None   Patient Would Like to Complete Advance Directive No    (Send w/patient)   No Doesnt Have       Isolation:  There are currently no Active Isolations       MDRO: No current active infections    Pain Medications given:  oxy    Last dose:yesterday    Special Equipment needed: yes  Type of equipment: walker         BACKGROUND     Allergies:  No Known Allergies    Past Medical History:   Diagnosis Date    Anemia     Autoimmune disease ([facility] [address]     crohn's disease    Closed left ankle fracture 3/21/2023    Crohn disease ([facility] [address] Diabetes ([facility] [address]     GERD (gastroesophageal reflux disease)     History of colonoscopy with polypectomy 03/12/2020    Dr. Enriquez Range: Surveillance for UC. Polypectomy (1cm in sigmoid colon)--repeat 3yrs. Hypertension        Past Surgical History:   Procedure Laterality Date    COLONOSCOPY N/A 6/30/2016    COLONOSCOPY performed by David Villegas MD at [facility] ENDOSCOPY    SIGMOIDOSCOPY,BIOPSY  6/30/2016         UPPER GI ENDOSCOPY,BIOPSY  6/30/2016            Medications Prior to Admission   Medication Sig    acetaminophen (TYLENOL) 500 mg tablet Take 1,000 mg by mouth every six (6) hours as needed for Pain. insulin glargine (LANTUS) 100 unit/mL injection by SubCUTAneous route nightly. pantoprazole (PROTONIX) 40 mg tablet Take 40 mg by mouth daily. lisinopriL (PRINIVIL, ZESTRIL) 10 mg tablet Take 1 tablet by mouth once daily    metFORMIN ER (GLUCOPHAGE XR) 500 mg tablet TAKE 2 TABLETS BY MOUTH TWICE DAILY WITH MEALS    mesalamine ER (APRISO) 0.375 gram 24 hour capsule Take 1.5 g by mouth daily. cetirizine (ZYRTEC) 10 mg tablet Take  by mouth daily as needed. multivitamins chew Take  by mouth daily. azaTHIOprine 100 mg tab 100 mg daily. BD Ultra-Fine Short Pen Needle 31 gauge x 5/16\" ndle See Admin Instructions. Hard scripts included in transfer packet no    Vaccinations:    Immunization History   Administered Date(s) Administered    COVID-19, MODERNA BLUE border, Primary or Immunocompromised, (age 18y+), IM, 100 mcg/0.5mL 01/26/2021, 02/24/2021    Influenza Vaccine 10/07/2016, 10/01/2020    Influenza, FLUARIX, FLULAVAL, FLUZONE (age 10 mo+) AND AFLURIA, (age 1 y+), PF, 0.5mL 09/21/2018, 12/04/2019    Pneumococcal Polysaccharide (PPSV-23) 08/02/2016    TB Skin Test (PPD) Intradermal 07/02/2016    Tdap 08/02/2016       Readmission Risks:    Known Risks: none        The Charlson CoMorbitiy Index tool is an evidenced based tool that has more automatic generated information.  The tool looks at many different items such as the age of the patient, how many times they were admitted in the last calendar year, current length of stay in the hospital and their diagnosis. All of these items are pulled automatically from information documented in the chart from various places and will generate a score that predicts whether a patient is at low (less than 13), medium (13-20) or high (21 or greater) risk of being readmitted.         ASSESSMENT                Temp: 97.7 °F (36.5 °C) (03/28/23 1529) Pulse (Heart Rate): 90 (03/28/23 1529)     Resp Rate: 16 (03/28/23 1529)           BP: (!) 141/70 (03/28/23 1529)     O2 Sat (%): 98 % (03/28/23 1529)     Weight: 126.2 kg (278 lb 3.5 oz)    Height: 5' 9\" (175.3 cm) (03/23/23 1111)       If above not within 1 hour of discharge:    BP:_____  P:____  R:____ T:_____ O2 Sat: ___%  O2: ______    Active Orders   Diet    ADULT DIET Regular; 4 carb choices (60 gm/meal); please cook veg extra soft if pt orders         Orientation: oriented to time, place, person and situation     Active Behaviors: None                                   Active Lines/Drains:  (Peg Tube / Mai / CL or S/L?): no    Urinary Status: Voiding, Bathroom privileges     Last BM:       Skin Integrity: Incision (comment) (Surgical leg wound)             Mobility: Slightly limited   Weight Bearing Status: NWB (Non Weight Bearing)      Gait Training  Assistive Device: Gait belt, Walker, rolling  Ambulation - Level of Assistance: Contact guard assistance  Distance (ft): 50 Feet (ft)         Lab Results   Component Value Date/Time    Glucose 237 (H) 03/22/2023 04:33 AM    Hemoglobin A1c 9.2 (H) 03/22/2023 04:33 AM    HGB 10.7 (L) 03/23/2023 04:08 AM    HGB 11.6 03/22/2023 04:33 AM        RECOMMENDATION     See After Visit Summary (AVS) for:  Discharge instructions  After [address] equipment needed (entered pre-discharge by Care Management)  Medication Reconciliation    Follow up Appointment(s) Report given/sent by:  Rosibel Maldonado                    Verbal report given to:  Remi Cook LPN  FAXED to:  sent with patient          Estimated discharge time:  3/28/2023 at 7 PM

## 2023-03-28 NOTE — PROGRESS NOTES
Problem: Falls - Risk of  Goal: *Absence of Falls  Description: Document Anatell Holter Fall Risk and appropriate interventions in the flowsheet. Outcome: Progressing Towards Goal  Note: Fall Risk Interventions:  Mobility Interventions: Communicate number of staff needed for ambulation/transfer         Medication Interventions: Patient to call before getting OOB    Elimination Interventions: Patient to call for help with toileting needs    History of Falls Interventions: Bed/chair exit alarm, Door open when patient unattended         Problem: Patient Education: Go to Patient Education Activity  Goal: Patient/Family Education  Outcome: Progressing Towards Goal     Problem: Pain  Goal: *Control of Pain  Outcome: Progressing Towards Goal     Problem: Patient Education: Go to Patient Education Activity  Goal: Patient/Family Education  Outcome: Progressing Towards Goal     Problem: Pressure Injury - Risk of  Goal: *Prevention of pressure injury  Description: Document Sergio Scale and appropriate interventions in the flowsheet.   Outcome: Progressing Towards Goal  Note: Pressure Injury Interventions:  Sensory Interventions: Float heels         Activity Interventions: PT/OT evaluation    Mobility Interventions: Float heels, HOB 30 degrees or less    Nutrition Interventions: Document food/fluid/supplement intake                     Problem: Patient Education: Go to Patient Education Activity  Goal: Patient/Family Education  Outcome: Progressing Towards Goal     Problem: Patient Education: Go to Patient Education Activity  Goal: Patient/Family Education  Outcome: Progressing Towards Goal     Problem: Patient Education: Go to Patient Education Activity  Goal: Patient/Family Education  Outcome: Progressing Towards Goal     Problem: Patient Education: Go to Patient Education Activity  Goal: Patient/Family Education  Outcome: Progressing Towards Goal     Problem: Falls - Risk of  Goal: *Absence of Falls  Description: Anna Lan Fall Risk and appropriate interventions in the flowsheet. Outcome: Progressing Towards Goal  Note: Fall Risk Interventions:  Mobility Interventions: Communicate number of staff needed for ambulation/transfer         Medication Interventions: Patient to call before getting OOB    Elimination Interventions: Patient to call for help with toileting needs    History of Falls Interventions: Bed/chair exit alarm, Door open when patient unattended         Problem: Patient Education: Go to Patient Education Activity  Goal: Patient/Family Education  Outcome: Progressing Towards Goal     Problem: Pain  Goal: *Control of Pain  Outcome: Progressing Towards Goal     Problem: Patient Education: Go to Patient Education Activity  Goal: Patient/Family Education  Outcome: Progressing Towards Goal     Problem: Pressure Injury - Risk of  Goal: *Prevention of pressure injury  Description: Document Sergio Scale and appropriate interventions in the flowsheet.   Outcome: Progressing Towards Goal  Note: Pressure Injury Interventions:  Sensory Interventions: Float heels         Activity Interventions: PT/OT evaluation    Mobility Interventions: Float heels, HOB 30 degrees or less    Nutrition Interventions: Document food/fluid/supplement intake                     Problem: Patient Education: Go to Patient Education Activity  Goal: Patient/Family Education  Outcome: Progressing Towards Goal     Problem: Patient Education: Go to Patient Education Activity  Goal: Patient/Family Education  Outcome: Progressing Towards Goal     Problem: Patient Education: Go to Patient Education Activity  Goal: Patient/Family Education  Outcome: Progressing Towards Goal     Problem: Patient Education: Go to Patient Education Activity  Goal: Patient/Family Education  Outcome: Progressing Towards Goal

## 2023-03-28 NOTE — PROGRESS NOTES
Problem: Falls - Risk of  Goal: *Absence of Falls  Description: Document Lance Rummage Fall Risk and appropriate interventions in the flowsheet. Outcome: Progressing Towards Goal  Note: Fall Risk Interventions:  Mobility Interventions: Communicate number of staff needed for ambulation/transfer         Medication Interventions: Patient to call before getting OOB    Elimination Interventions: Call light in reach, Patient to call for help with toileting needs    History of Falls Interventions: Investigate reason for fall, Room close to nurse's station         Problem: Pressure Injury - Risk of  Goal: *Prevention of pressure injury  Description: Document Sergio Scale and appropriate interventions in the flowsheet. Outcome: Progressing Towards Goal  Note: Pressure Injury Interventions:  Sensory Interventions: Assess changes in LOC, Float heels         Activity Interventions: Increase time out of bed    Mobility Interventions: Float heels, HOB 30 degrees or less    Nutrition Interventions: Document food/fluid/supplement intake    Friction and Shear Interventions: Minimize layers, Lift sheet, HOB 30 degrees or less                Problem: Diabetes Self-Management  Goal: *Incorporating nutritional management into lifestyle  Description: Describe effect of type, amount and timing of food on blood glucose; list 3 methods for planning meals. Outcome: Progressing Towards Goal     Problem: Diabetes Self-Management  Goal: *Developing strategies to promote health/change behavior  Description: Define the ABC's of diabetes; identify appropriate screenings, schedule and personal plan for screenings.   Outcome: Progressing Towards Goal

## 2023-03-28 NOTE — PROGRESS NOTES
Ortho / Neurosurgery NP Note    POD# 5  s/p OPEN REDUCTION INTERNAL FIXATION LEFT LATERAL MALLEOLUS, OPEN REDUCTION INTERNAL FIXATION SYNDESMOSIS AND LATERAL LIGAMENT RECONSTRUCTION(GENERAL WITH POPLITEAL AND ADDUCTOR BLOCK)   Pt seen with no visitor present. Pt resting in recliner chair. Awake and alert, in NAD. No changes overnight, waiting insurance auth for SNF placement   Reports postop pain well controlled with oxycodone   Denies numbness or tingling in LLE  No complaints. Tolerating regular diet. No nausea. Hx Chron's, bowel regimen d/c    VSS Afebrile. Room air.      Visit Vitals  /62 (BP 1 Location: Right upper arm, BP Patient Position: Sitting;Reclining)   Pulse 93   Temp 97.7 °F (36.5 °C)   Resp 16   Ht 5' 9\" (1.753 m)   Wt 126.2 kg (278 lb 3.5 oz)   LMP 01/20/2023 (Approximate)   SpO2 98%   BMI 41.09 kg/m²       Voiding status: voiding   Output (mL)  Urine Voided: 350 ml (03/23/23 0455)  Unmeasurable Output  Urine Occurrence(s): 2 (03/28/23 1040)  Stool Occurrence(s): 1 (03/23/23 2129)  Straight Cath  Straight Cath: Nurse performed cath;Sterile technique used (03/22/23 0506)  Number of Attempts: 1 (03/22/23 0506)  Catheter Size: 16 FR (03/22/23 0506)  Time Catheter Inserted: 4975 (03/22/23 0506)  Time Catheter Removed: 7893 (03/22/23 0506)  Urine: 800 mL (03/22/23 0506)      Labs    Lab Results   Component Value Date/Time    HGB 10.7 (L) 03/23/2023 04:08 AM      No results found for: INR, INREXT, INREXT   Lab Results   Component Value Date/Time    Sodium 136 03/22/2023 04:33 AM    Potassium 3.9 03/22/2023 04:33 AM    Chloride 104 03/22/2023 04:33 AM    CO2 27 03/22/2023 04:33 AM    Glucose 237 (H) 03/22/2023 04:33 AM    BUN 13 03/22/2023 04:33 AM    Creatinine 0.58 03/22/2023 04:33 AM    Calcium 8.9 03/22/2023 04:33 AM     Recent Glucose Results:   Lab Results   Component Value Date/Time    GLUCPOC 97 03/28/2023 11:02 AM    GLUCPOC 90 03/28/2023 06:55 AM    GLUCPOC 88 03/27/2023 08:46 PM Body mass index is 41.09 kg/m². : A BMI > 30 is classified as obesity and > 40 is classified as morbid obesity. Dressing c.d.i - LLE elevated   Calves soft and supple; No pain with passive stretch  Sensation and motor intact. +PF/DF/EHL intact. LLE skin warm/pink, +cap refill, unable to palpate pedal pulse due to cast  Swelling improved over the weekend. Trace edema   SCDs for mechanical DVT proph while in bed     PLAN:  1) PT/OT - [facility]   2) Lovenox 30 mg BID for DVT Prophylaxis for 6 weeks  3) Pain control - scheduled tylenol, and prn  oxycodone  5-10 mg q4 hrs. 4) Uncontrolled DM2 -  A1C 9.2 (3/23). Glucose stable, . Continue Lantus and SSI, 60 gram diabetic diet. 5) Discharge planning - Lives alone in second floor apartment. Medically stable for discharge. SNF placement, [facility] accepting, pending insurance auth. Hopefully tomorrow.     Micah Ocasio NP

## 2023-03-29 ENCOUNTER — PATIENT OUTREACH (OUTPATIENT)
Dept: CASE MANAGEMENT | Age: 48
End: 2023-03-29

## 2023-04-05 ENCOUNTER — PATIENT OUTREACH (OUTPATIENT)
Dept: CASE MANAGEMENT | Age: 48
End: 2023-04-05

## 2023-05-22 RX ORDER — ACETAMINOPHEN 500 MG
1000 TABLET ORAL EVERY 6 HOURS PRN
COMMUNITY

## 2023-05-22 RX ORDER — CETIRIZINE HYDROCHLORIDE 10 MG/1
TABLET ORAL DAILY PRN
COMMUNITY
End: 2023-05-25

## 2023-05-22 RX ORDER — B-COMPLEX WITH VITAMIN C
1 TABLET ORAL
Qty: 90 TABLET | Refills: 2 | COMMUNITY
Start: 2023-03-23 | End: 2023-06-21

## 2023-05-22 RX ORDER — INSULIN GLARGINE 100 [IU]/ML
INJECTION, SOLUTION SUBCUTANEOUS
COMMUNITY
End: 2023-05-25 | Stop reason: SDUPTHER

## 2023-05-22 RX ORDER — AZATHIOPRINE 100 MG/1
100 TABLET ORAL DAILY
COMMUNITY
Start: 2016-07-15

## 2023-05-22 RX ORDER — PANTOPRAZOLE SODIUM 40 MG/1
40 TABLET, DELAYED RELEASE ORAL DAILY
COMMUNITY

## 2023-05-22 RX ORDER — MESALAMINE 0.38 G/1
1.5 CAPSULE, EXTENDED RELEASE ORAL DAILY
COMMUNITY

## 2023-05-22 RX ORDER — LISINOPRIL 10 MG/1
1 TABLET ORAL DAILY
COMMUNITY
Start: 2023-02-19

## 2023-05-22 RX ORDER — METFORMIN HYDROCHLORIDE 500 MG/1
2 TABLET, EXTENDED RELEASE ORAL 2 TIMES DAILY WITH MEALS
COMMUNITY
Start: 2022-12-08

## 2023-05-22 SDOH — ECONOMIC STABILITY: INCOME INSECURITY: HOW HARD IS IT FOR YOU TO PAY FOR THE VERY BASICS LIKE FOOD, HOUSING, MEDICAL CARE, AND HEATING?: NOT HARD AT ALL

## 2023-05-22 SDOH — ECONOMIC STABILITY: TRANSPORTATION INSECURITY
IN THE PAST 12 MONTHS, HAS LACK OF TRANSPORTATION KEPT YOU FROM MEETINGS, WORK, OR FROM GETTING THINGS NEEDED FOR DAILY LIVING?: NO

## 2023-05-22 SDOH — ECONOMIC STABILITY: FOOD INSECURITY: WITHIN THE PAST 12 MONTHS, THE FOOD YOU BOUGHT JUST DIDN'T LAST AND YOU DIDN'T HAVE MONEY TO GET MORE.: NEVER TRUE

## 2023-05-22 SDOH — ECONOMIC STABILITY: FOOD INSECURITY: WITHIN THE PAST 12 MONTHS, YOU WORRIED THAT YOUR FOOD WOULD RUN OUT BEFORE YOU GOT MONEY TO BUY MORE.: NEVER TRUE

## 2023-05-22 SDOH — ECONOMIC STABILITY: HOUSING INSECURITY
IN THE LAST 12 MONTHS, WAS THERE A TIME WHEN YOU DID NOT HAVE A STEADY PLACE TO SLEEP OR SLEPT IN A SHELTER (INCLUDING NOW)?: NO

## 2023-05-25 ENCOUNTER — TELEMEDICINE (OUTPATIENT)
Age: 48
End: 2023-05-25
Payer: COMMERCIAL

## 2023-05-25 DIAGNOSIS — E11.65 TYPE 2 DIABETES MELLITUS WITH HYPERGLYCEMIA, WITH LONG-TERM CURRENT USE OF INSULIN (HCC): Primary | ICD-10-CM

## 2023-05-25 DIAGNOSIS — S82.892A OTHER FRACTURE OF LEFT LOWER LEG, INITIAL ENCOUNTER FOR CLOSED FRACTURE: ICD-10-CM

## 2023-05-25 DIAGNOSIS — Z09 HOSPITAL DISCHARGE FOLLOW-UP: ICD-10-CM

## 2023-05-25 DIAGNOSIS — Z79.4 TYPE 2 DIABETES MELLITUS WITH HYPERGLYCEMIA, WITH LONG-TERM CURRENT USE OF INSULIN (HCC): Primary | ICD-10-CM

## 2023-05-25 DIAGNOSIS — I10 ESSENTIAL (PRIMARY) HYPERTENSION: ICD-10-CM

## 2023-05-25 PROCEDURE — 3046F HEMOGLOBIN A1C LEVEL >9.0%: CPT | Performed by: INTERNAL MEDICINE

## 2023-05-25 PROCEDURE — 99214 OFFICE O/P EST MOD 30 MIN: CPT | Performed by: INTERNAL MEDICINE

## 2023-05-25 RX ORDER — BLOOD-GLUCOSE,RECEIVER,CONT
EACH MISCELLANEOUS
Qty: 3 EACH | Refills: 5 | Status: SHIPPED | OUTPATIENT
Start: 2023-05-25

## 2023-05-25 RX ORDER — INSULIN ASPART 100 [IU]/ML
INJECTION, SOLUTION INTRAVENOUS; SUBCUTANEOUS
COMMUNITY
Start: 2023-04-29 | End: 2023-05-25

## 2023-05-25 RX ORDER — INSULIN GLARGINE 100 [IU]/ML
30 INJECTION, SOLUTION SUBCUTANEOUS NIGHTLY
Qty: 9 ADJUSTABLE DOSE PRE-FILLED PEN SYRINGE | Refills: 3 | Status: SHIPPED | OUTPATIENT
Start: 2023-05-25

## 2023-05-25 RX ORDER — PEN NEEDLE, DIABETIC 31 GX5/16"
NEEDLE, DISPOSABLE MISCELLANEOUS
Qty: 200 EACH | Refills: 3 | Status: SHIPPED | OUTPATIENT
Start: 2023-05-25

## 2023-05-25 RX ORDER — PEDI MULTIVIT NO.12 W-FLUORIDE 0.5 MG
TABLET,CHEWABLE ORAL DAILY
COMMUNITY

## 2023-05-25 RX ORDER — BLOOD-GLUCOSE SENSOR
EACH MISCELLANEOUS
Qty: 3 EACH | Refills: 5 | Status: SHIPPED | OUTPATIENT
Start: 2023-05-25

## 2023-05-25 RX ORDER — INSULIN ASPART 100 [IU]/ML
INJECTION, SOLUTION INTRAVENOUS; SUBCUTANEOUS
Qty: 3 ADJUSTABLE DOSE PRE-FILLED PEN SYRINGE | Refills: 3 | Status: SHIPPED | OUTPATIENT
Start: 2023-05-25

## 2023-05-25 RX ORDER — ENOXAPARIN SODIUM 100 MG/ML
INJECTION SUBCUTANEOUS
COMMUNITY
Start: 2023-04-29

## 2023-05-25 RX ORDER — CETIRIZINE HYDROCHLORIDE 10 MG/1
CAPSULE, LIQUID FILLED ORAL
COMMUNITY

## 2023-05-25 ASSESSMENT — PATIENT HEALTH QUESTIONNAIRE - PHQ9
SUM OF ALL RESPONSES TO PHQ QUESTIONS 1-9: 0
1. LITTLE INTEREST OR PLEASURE IN DOING THINGS: 0
SUM OF ALL RESPONSES TO PHQ QUESTIONS 1-9: 0
SUM OF ALL RESPONSES TO PHQ9 QUESTIONS 1 & 2: 0
SUM OF ALL RESPONSES TO PHQ QUESTIONS 1-9: 0
SUM OF ALL RESPONSES TO PHQ QUESTIONS 1-9: 0
2. FEELING DOWN, DEPRESSED OR HOPELESS: 0

## 2023-07-20 NOTE — TELEPHONE ENCOUNTER
[facility] is following up on refill request for the Glucophage-XR. Please review. ----------------------------------------------------  Last appointment: 05/25/2023 Virtual visit MD Jade Briseno   Next appointment: Nothing scheduled   Previous refill encounter(s):   12/08/2022 Glucophage-XR #360 with 1 refill.      For Pharmacy Admin Tracking Only    Program: Medication Refill  Intervention Detail: New Rx: 1, reason: Patient Preference  Time Spent (min): 5      Requested Prescriptions     Pending Prescriptions Disp Refills    metFORMIN (GLUCOPHAGE-XR) 500 MG extended release tablet [Pharmacy Med Name: metFORMIN HCl  MG Oral Tablet Extended Release 24 Hour] 360 tablet 0     Sig: TAKE 2 TABLETS BY MOUTH TWICE DAILY WITH MEALS

## 2023-08-06 RX ORDER — METFORMIN HYDROCHLORIDE 500 MG/1
TABLET, EXTENDED RELEASE ORAL
Qty: 360 TABLET | Refills: 1 | Status: SHIPPED | OUTPATIENT
Start: 2023-08-06

## 2023-09-15 NOTE — PROGRESS NOTES
Chief Complaint   Patient presents with    Diabetes       Patient was last seen: New Patient Visit     General:   DM2 - 9 years ago   Metformin - taking, no GI issues unless Immediate release (has crohn's disease)  No sulfonylurea, no TZD  Januvia & tradgenta - stopped when insulin added   On jardiance - didn't seem to work  No GLP1  On insulin until DKA in 2/2023    Worried about GI issues with GLP1s     Has G7 continuous glucose monitor   Phone not compatible     Commercial, has deductible $3000, has met it - in hosp in Feb - PNA - triggered DKA    Then broke leg and met with     A1c: last a1c was 9.2 3/2023    DM Medications:    Metformin ER 500mg 2 BID  Basaglar 30 units - HS - 9-10  Novolog - 2u if > 150, 4u if > 250, 8u if > 350     Last Changes: : no recent changes    Sugar Checks: checks more than 4 times a day and uses Dexcom CGM           AM: reports:      PM: reports:    30 days      LOWs:  denies low sugars     DIET: feels does well with diabetic diet, has received diabetic meal training, in the past   Dinner: last night: meatloaf, no sides, diet ginger ale    EXERCISE: exercise limited due to pain -broke leg    HTN: on ACE-I, HTN followed by PCP     LIPIDS: on no meds, lipids followed by PCP    RENAL: has normal renal function, is on an ACE-I     EYES: has no retinopathy, overdue for eye exam     DENTAL:  overdue for dentist     FEET: has no current issues, no numbness or tingling     HEART:  no chest pain, shortness of breath or claudication, has no cardiac history     ASA:  does not take aspirin or other blood thinner     SYMPTOMS: no polyuria, thirst or blurred vision     THYROID: no known thyroid issue    DIABETES HISTORY: see above       LABS/STUDIES:     No results found for: \"FRL7NFMR\"    Lab Results   Component Value Date/Time    LABA1C 9.2 03/22/2023 04:33 AM    LABA1C 9.6 04/08/2022 12:00 AM    LABA1C 10.5 09/21/2021 04:00 PM    CREATININE 0.58 03/22/2023 04:33 AM    EGFR >60 03/22/2023 04:33

## 2023-09-18 ENCOUNTER — OFFICE VISIT (OUTPATIENT)
Age: 48
End: 2023-09-18

## 2023-09-18 VITALS
HEIGHT: 69 IN | DIASTOLIC BLOOD PRESSURE: 61 MMHG | BODY MASS INDEX: 41.09 KG/M2 | SYSTOLIC BLOOD PRESSURE: 166 MMHG | HEART RATE: 75 BPM

## 2023-09-18 DIAGNOSIS — I10 PRIMARY HYPERTENSION: ICD-10-CM

## 2023-09-18 DIAGNOSIS — E11.65 TYPE 2 DIABETES MELLITUS WITH HYPERGLYCEMIA, WITH LONG-TERM CURRENT USE OF INSULIN (HCC): Primary | ICD-10-CM

## 2023-09-18 DIAGNOSIS — Z79.4 TYPE 2 DIABETES MELLITUS WITH HYPERGLYCEMIA, WITH LONG-TERM CURRENT USE OF INSULIN (HCC): Primary | ICD-10-CM

## 2023-09-18 RX ORDER — SEMAGLUTIDE 0.68 MG/ML
INJECTION, SOLUTION SUBCUTANEOUS
Qty: 3 ML | Refills: 0 | Status: SHIPPED | COMMUNITY
Start: 2023-09-18

## 2023-09-18 RX ORDER — AZATHIOPRINE 50 MG/1
100 TABLET ORAL DAILY
COMMUNITY
Start: 2023-08-22

## 2023-09-18 RX ORDER — FAMOTIDINE 20 MG/1
TABLET, FILM COATED ORAL
COMMUNITY

## 2023-09-18 NOTE — PATIENT INSTRUCTIONS
Referral was made to [facility] ([phone]) for more education    Start ozempic 1/2 of the 0.25mg dose for 1-2 weeks (~9 clicks)   If tolerated, increase to 0.25mg  Can then go up to 0.5mg if tolerated   There is a 1.0mg and 2.0mg dose too  (Check if plan coveres it - they may prefer Trulicity    Lower insulin by 5 units and further decrease as needed to prevent lows as increase ozempic    See if mounjaro on plan and if so what are the requirements

## 2023-09-20 DIAGNOSIS — E11.65 TYPE 2 DIABETES MELLITUS WITH HYPERGLYCEMIA, WITH LONG-TERM CURRENT USE OF INSULIN (HCC): ICD-10-CM

## 2023-09-20 DIAGNOSIS — Z79.4 TYPE 2 DIABETES MELLITUS WITH HYPERGLYCEMIA, WITH LONG-TERM CURRENT USE OF INSULIN (HCC): ICD-10-CM

## 2023-09-20 NOTE — TELEPHONE ENCOUNTER
Jim Yun-  I met you on Monday and asked about prescriptions. You said that you could take on the ones related to diabetes that my primary has been completing. You told me to ask the pharmacist if they could just be put in your name and transferred to you. I called and they cannot. The prescriptions I need filled are for Novolog flexpen 100 unit/ML (If bg is above 150- 2 units, above 250 4 units, above 350, 8 units and call doctor) , Surinder Sizer 100 unit (currently 30 units once a day at bedtime), Dexcom G7 Sensor MIS, MetforminER 500 mg tab (2 tablets, 2 times/day) and B-D UF iii Short Pen need MIS. Thank you for your help.

## 2023-09-21 RX ORDER — PEN NEEDLE, DIABETIC 31 GX5/16"
NEEDLE, DISPOSABLE MISCELLANEOUS
Qty: 200 EACH | Refills: 3 | Status: SHIPPED | OUTPATIENT
Start: 2023-09-21

## 2023-09-21 RX ORDER — INSULIN ASPART 100 [IU]/ML
INJECTION, SOLUTION INTRAVENOUS; SUBCUTANEOUS
Qty: 5 ADJUSTABLE DOSE PRE-FILLED PEN SYRINGE | Refills: 2 | Status: SHIPPED | OUTPATIENT
Start: 2023-09-21

## 2023-09-21 RX ORDER — ACYCLOVIR 400 MG/1
TABLET ORAL
Qty: 3 EACH | Refills: 5 | Status: SHIPPED | OUTPATIENT
Start: 2023-09-21

## 2023-09-21 RX ORDER — METFORMIN HYDROCHLORIDE 500 MG/1
1000 TABLET, EXTENDED RELEASE ORAL 2 TIMES DAILY WITH MEALS
Qty: 360 TABLET | Refills: 2 | Status: SHIPPED | OUTPATIENT
Start: 2023-09-21

## 2023-09-21 RX ORDER — INSULIN GLARGINE 100 [IU]/ML
INJECTION, SOLUTION SUBCUTANEOUS
Qty: 9 ML | Refills: 2 | Status: SHIPPED | OUTPATIENT
Start: 2023-09-21

## 2023-10-06 DIAGNOSIS — E11.65 TYPE 2 DIABETES MELLITUS WITH HYPERGLYCEMIA, WITH LONG-TERM CURRENT USE OF INSULIN (HCC): Primary | ICD-10-CM

## 2023-10-06 DIAGNOSIS — Z79.4 TYPE 2 DIABETES MELLITUS WITH HYPERGLYCEMIA, WITH LONG-TERM CURRENT USE OF INSULIN (HCC): Primary | ICD-10-CM

## 2023-10-06 RX ORDER — TIRZEPATIDE 5 MG/.5ML
INJECTION, SOLUTION SUBCUTANEOUS
Qty: 2 ML | Refills: 1 | Status: SHIPPED | OUTPATIENT
Start: 2023-10-06

## 2023-10-19 NOTE — TELEPHONE ENCOUNTER
Last appointment: 05/25/2023 MD Adriane De La Cruz   Next appointment: Nothing scheduled  Previous refill encounter(s):   02/19/2023 Prinivil #90 with 1 refill.      For Pharmacy Admin Tracking Only    Program: Medication Refill  Intervention Detail: New Rx: 1, reason: Patient Preference  Time Spent (min): 5      Requested Prescriptions     Pending Prescriptions Disp Refills    lisinopril (PRINIVIL;ZESTRIL) 10 MG tablet [Pharmacy Med Name: Lisinopril 10 MG Oral Tablet] 90 tablet 0     Sig: Take 1 tablet by mouth once daily

## 2023-10-20 RX ORDER — LISINOPRIL 10 MG/1
10 TABLET ORAL DAILY
Qty: 90 TABLET | Refills: 1 | Status: SHIPPED | OUTPATIENT
Start: 2023-10-20

## 2023-12-27 RX ORDER — INSULIN GLARGINE 100 [IU]/ML
INJECTION, SOLUTION SUBCUTANEOUS
Qty: 9 ML | Refills: 0 | OUTPATIENT
Start: 2023-12-27

## 2024-04-10 DIAGNOSIS — Z79.4 TYPE 2 DIABETES MELLITUS WITH HYPERGLYCEMIA, WITH LONG-TERM CURRENT USE OF INSULIN (HCC): ICD-10-CM

## 2024-04-10 DIAGNOSIS — E11.65 TYPE 2 DIABETES MELLITUS WITH HYPERGLYCEMIA, WITH LONG-TERM CURRENT USE OF INSULIN (HCC): ICD-10-CM

## 2024-04-10 RX ORDER — TIRZEPATIDE 7.5 MG/.5ML
INJECTION, SOLUTION SUBCUTANEOUS
Qty: 4 ML | Refills: 0 | Status: SHIPPED | OUTPATIENT
Start: 2024-04-10

## 2024-04-10 RX ORDER — LISINOPRIL 10 MG/1
10 TABLET ORAL DAILY
Qty: 90 TABLET | Refills: 0 | Status: SHIPPED | OUTPATIENT
Start: 2024-04-10

## 2024-04-10 NOTE — TELEPHONE ENCOUNTER
Please contact patient for scheduling. Thanks  Writer sent pt a message via [employer].     Last appointment: 05/25/2023 Virtual visit MD David   Next appointment: Nothing scheduled   Previous refill encounter(s):   10/20/2023 Lisinopril #90 with 1 refill.     For Pharmacy Admin Tracking Only    Program: Medication Refill  Intervention Detail: New Rx: 1, reason: Patient Preference  Time Spent (min): 10    Requested Prescriptions     Pending Prescriptions Disp Refills    lisinopril (PRINIVIL;ZESTRIL) 10 MG tablet [Pharmacy Med Name: Lisinopril 10 MG Oral Tablet] 90 tablet 0     Sig: Take 1 tablet by mouth once daily

## 2024-04-11 NOTE — TELEPHONE ENCOUNTER
Refill request(s) approved--lisinopril--90-day supply with 0 refill(s).    Refill protocol details (computer-generated) reviewed, as available.      GoodRxt message to pt--to schedule follow-up appt and labs, or get us labs if seeing outside provider.

## 2024-05-07 DIAGNOSIS — Z79.4 TYPE 2 DIABETES MELLITUS WITH HYPERGLYCEMIA, WITH LONG-TERM CURRENT USE OF INSULIN (HCC): ICD-10-CM

## 2024-05-07 DIAGNOSIS — E11.65 TYPE 2 DIABETES MELLITUS WITH HYPERGLYCEMIA, WITH LONG-TERM CURRENT USE OF INSULIN (HCC): ICD-10-CM

## 2024-05-08 RX ORDER — TIRZEPATIDE 7.5 MG/.5ML
INJECTION, SOLUTION SUBCUTANEOUS
Qty: 4 ML | Refills: 0 | Status: SHIPPED | OUTPATIENT
Start: 2024-05-08

## 2024-05-08 RX ORDER — ACYCLOVIR 400 MG/1
TABLET ORAL
Qty: 3 EACH | Refills: 0 | Status: SHIPPED | OUTPATIENT
Start: 2024-05-08

## 2024-05-08 NOTE — TELEPHONE ENCOUNTER
Request was sent to Dr. NELL David pt's PCP. Writer is touting to Dr. NELL Hernandez pt's diabetic provider.     Last appointment: 12/18/2023 MD Hernandez   Next appointment: 06/24/2024 MD Hernandez  Previous refill encounter(s):   09/21/2023 Dexcon G7 Sensor #3 with 5 refills. Prescribed by Dr. NELL Hernandez.     For Pharmacy Admin Tracking Only    Program: Medication Refill  Intervention Detail: New Rx: 1, reason: Patient Preference  Time Spent (min): 5    Requested Prescriptions     Pending Prescriptions Disp Refills    Continuous Glucose Sensor (DEXCOM G7 SENSOR) MISC [Pharmacy Med Name: DEXCOM G7 SENSOR    MIS] 3 each 0     Sig: USE FOR GLUCOSE MONITORING AS REVIEWED

## 2024-05-18 DIAGNOSIS — Z79.4 TYPE 2 DIABETES MELLITUS WITH HYPERGLYCEMIA, WITH LONG-TERM CURRENT USE OF INSULIN (HCC): ICD-10-CM

## 2024-05-18 DIAGNOSIS — E11.65 TYPE 2 DIABETES MELLITUS WITH HYPERGLYCEMIA, WITH LONG-TERM CURRENT USE OF INSULIN (HCC): ICD-10-CM

## 2024-05-30 DIAGNOSIS — Z79.4 TYPE 2 DIABETES MELLITUS WITH HYPERGLYCEMIA, WITH LONG-TERM CURRENT USE OF INSULIN (HCC): ICD-10-CM

## 2024-05-30 DIAGNOSIS — E11.65 TYPE 2 DIABETES MELLITUS WITH HYPERGLYCEMIA, WITH LONG-TERM CURRENT USE OF INSULIN (HCC): ICD-10-CM

## 2024-05-30 RX ORDER — TIRZEPATIDE 7.5 MG/.5ML
INJECTION, SOLUTION SUBCUTANEOUS
Qty: 4 ML | Refills: 0 | Status: SHIPPED | OUTPATIENT
Start: 2024-05-30

## 2024-05-30 RX ORDER — INSULIN GLARGINE 100 [IU]/ML
INJECTION, SOLUTION SUBCUTANEOUS
Qty: 15 ML | Refills: 0 | Status: SHIPPED | OUTPATIENT
Start: 2024-05-30

## 2024-06-19 LAB
ALBUMIN/CREAT UR: 34 MG/G CREAT (ref 0–29)
BUN SERPL-MCNC: 22 MG/DL (ref 6–24)
BUN/CREAT SERPL: 29 (ref 9–23)
CALCIUM SERPL-MCNC: 9.3 MG/DL (ref 8.7–10.2)
CHLORIDE SERPL-SCNC: 101 MMOL/L (ref 96–106)
CO2 SERPL-SCNC: 23 MMOL/L (ref 20–29)
CREAT SERPL-MCNC: 0.75 MG/DL (ref 0.57–1)
CREAT UR-MCNC: 98.7 MG/DL
EGFRCR SERPLBLD CKD-EPI 2021: 98 ML/MIN/1.73
GLUCOSE SERPL-MCNC: 162 MG/DL (ref 70–99)
HBA1C MFR BLD: 7.9 % (ref 4.8–5.6)
MICROALBUMIN UR-MCNC: 33.7 UG/ML
POTASSIUM SERPL-SCNC: 4.7 MMOL/L (ref 3.5–5.2)
SODIUM SERPL-SCNC: 137 MMOL/L (ref 134–144)

## 2024-06-24 ENCOUNTER — OFFICE VISIT (OUTPATIENT)
Age: 49
End: 2024-06-24
Payer: COMMERCIAL

## 2024-06-24 VITALS
BODY MASS INDEX: 43.4 KG/M2 | SYSTOLIC BLOOD PRESSURE: 167 MMHG | HEIGHT: 69 IN | DIASTOLIC BLOOD PRESSURE: 73 MMHG | WEIGHT: 293 LBS | HEART RATE: 90 BPM

## 2024-06-24 DIAGNOSIS — Z79.4 TYPE 2 DIABETES MELLITUS WITH HYPERGLYCEMIA, WITH LONG-TERM CURRENT USE OF INSULIN (HCC): Primary | ICD-10-CM

## 2024-06-24 DIAGNOSIS — E11.65 TYPE 2 DIABETES MELLITUS WITH HYPERGLYCEMIA, WITH LONG-TERM CURRENT USE OF INSULIN (HCC): Primary | ICD-10-CM

## 2024-06-24 DIAGNOSIS — I10 PRIMARY HYPERTENSION: ICD-10-CM

## 2024-06-24 DIAGNOSIS — E11.65 TYPE 2 DIABETES MELLITUS WITH HYPERGLYCEMIA, WITH LONG-TERM CURRENT USE OF INSULIN (HCC): ICD-10-CM

## 2024-06-24 DIAGNOSIS — Z79.4 TYPE 2 DIABETES MELLITUS WITH HYPERGLYCEMIA, WITH LONG-TERM CURRENT USE OF INSULIN (HCC): ICD-10-CM

## 2024-06-24 PROCEDURE — 99214 OFFICE O/P EST MOD 30 MIN: CPT | Performed by: INTERNAL MEDICINE

## 2024-06-24 PROCEDURE — 3078F DIAST BP <80 MM HG: CPT | Performed by: INTERNAL MEDICINE

## 2024-06-24 PROCEDURE — 3051F HG A1C>EQUAL 7.0%<8.0%: CPT | Performed by: INTERNAL MEDICINE

## 2024-06-24 PROCEDURE — 95251 CONT GLUC MNTR ANALYSIS I&R: CPT | Performed by: INTERNAL MEDICINE

## 2024-06-24 PROCEDURE — 3077F SYST BP >= 140 MM HG: CPT | Performed by: INTERNAL MEDICINE

## 2024-06-24 RX ORDER — TIRZEPATIDE 10 MG/.5ML
INJECTION, SOLUTION SUBCUTANEOUS
Qty: 2 ML | Refills: 2 | Status: SHIPPED | OUTPATIENT
Start: 2024-06-24

## 2024-06-24 NOTE — PROGRESS NOTES
Chief Complaint   Patient presents with    Diabetes       Patient was last seen: 6 months ago 12/18/2023       General:   Good for several months  Then last couple months sugars higher    Did not do PDH    Has G7 - Phone not compatible     [Commercial, has deductible $3000]    A1c: last a1c was 9.2 3/2023    DM Medications:    Mounjaro 7.5mg once a week   Metformin ER 500mg 2 BID  Lantus  30 units - HS - 9-10 (has tried to lower and not happy with numbers so went back up)  Novolog - 2u if > 150, 4u if > 250, 8u if > 350 -rare use    Last Changes: : no recent changes    Sugar Checks: checks more than 4 times a day and uses Dexcom CGM         AM: reports:      PM: reports:    90 days:          LOWs:  denies low sugars     DIET: feels does well with diabetic diet, has received diabetic meal training, in the past     EXERCISE: exercise limited due to pain  had tendonitis in foot     HTN: on ACE-I, HTN followed by PCP     LIPIDS: on no meds, lipids followed by PCP    RENAL: has normal renal function, **has positive BENEDICTO-r** , in the past year, is on an ACE-I     EYES: has no retinopathy, overdue for eye exam     DENTAL:  overdue for dentist     FEET: has no current issues, no numbness or tingling     HEART:  no chest pain, shortness of breath or claudication, has no cardiac history     ASA:  does not take aspirin or other blood thinner     SYMPTOMS: no polyuria, thirst or blurred vision     THYROID: no known thyroid issue    DIABETES HISTORY:   From initial visit: 9/18/2023    DM2 - 9 years ago   Metformin - taking, no GI issues unless Immediate release (has crohn's disease)  No sulfonylurea, no TZD  Januvia & tradgenta - stopped when insulin added   On jardiance - didn't seem to work  No GLP1  On insulin until DKA in 2/2023      LABS/STUDIES:     Lab Results   Component Value Date/Time    LABA1C 7.9 06/18/2024 10:22 AM    LABA1C 6.8 12/18/2023 12:00 AM    LABA1C 9.2 03/22/2023 04:33 AM    CREATININE 0.75 06/18/2024 10:22

## 2024-06-25 DIAGNOSIS — Z79.4 TYPE 2 DIABETES MELLITUS WITH HYPERGLYCEMIA, WITH LONG-TERM CURRENT USE OF INSULIN (HCC): ICD-10-CM

## 2024-06-25 DIAGNOSIS — E11.65 TYPE 2 DIABETES MELLITUS WITH HYPERGLYCEMIA, WITH LONG-TERM CURRENT USE OF INSULIN (HCC): ICD-10-CM

## 2024-06-25 RX ORDER — ACYCLOVIR 400 MG/1
TABLET ORAL
Qty: 3 EACH | Refills: 0 | OUTPATIENT
Start: 2024-06-25

## 2024-06-25 NOTE — TELEPHONE ENCOUNTER
The Dexcom G7 Sensor is prescribed by Dr. NELL Hernandez.     For Pharmacy Admin Tracking Only    Program: Medication Refill  Intervention Detail: Discontinued Rx: 1, reason: Duplicate Therapy  Time Spent (min): 5    Requested Prescriptions     Pending Prescriptions Disp Refills    Continuous Glucose Sensor (DEXCOM G7 SENSOR) MISC [Pharmacy Med Name: DEXCOM G7 SENSOR    MIS] 3 each 0     Sig: USE FOR GLUCOSE MONITORING AS REVIEWED

## 2024-06-25 NOTE — TELEPHONE ENCOUNTER
Good morning!     I just received a call from my pharmacy saying that my request for my CGM sensors was denied.  I have one on now and one more to use, then I will be out.  Can you please renew that prescription?     Thanks!

## 2024-06-26 RX ORDER — ACYCLOVIR 400 MG/1
TABLET ORAL
Qty: 3 EACH | Refills: 5 | Status: SHIPPED | OUTPATIENT
Start: 2024-06-26

## 2024-06-28 RX ORDER — INSULIN GLARGINE 100 [IU]/ML
INJECTION, SOLUTION SUBCUTANEOUS
Qty: 15 ML | Refills: 0 | Status: SHIPPED | OUTPATIENT
Start: 2024-06-28

## 2024-07-24 RX ORDER — INSULIN GLARGINE 100 [IU]/ML
INJECTION, SOLUTION SUBCUTANEOUS
Qty: 15 ML | Refills: 0 | Status: SHIPPED | OUTPATIENT
Start: 2024-07-24

## 2024-07-31 DIAGNOSIS — Z79.4 TYPE 2 DIABETES MELLITUS WITH HYPERGLYCEMIA, WITH LONG-TERM CURRENT USE OF INSULIN (HCC): ICD-10-CM

## 2024-07-31 DIAGNOSIS — E11.65 TYPE 2 DIABETES MELLITUS WITH HYPERGLYCEMIA, WITH LONG-TERM CURRENT USE OF INSULIN (HCC): ICD-10-CM

## 2024-07-31 RX ORDER — ACYCLOVIR 400 MG/1
TABLET ORAL
Qty: 1 EACH | Refills: 0 | Status: SHIPPED | OUTPATIENT
Start: 2024-07-31

## 2024-08-20 RX ORDER — METFORMIN HYDROCHLORIDE 500 MG/1
1000 TABLET, EXTENDED RELEASE ORAL 2 TIMES DAILY WITH MEALS
Qty: 360 TABLET | Refills: 0 | Status: SHIPPED | OUTPATIENT
Start: 2024-08-20

## 2024-08-21 RX ORDER — INSULIN GLARGINE 100 [IU]/ML
INJECTION, SOLUTION SUBCUTANEOUS
Qty: 15 ML | Refills: 0 | Status: SHIPPED | OUTPATIENT
Start: 2024-08-21

## 2024-09-05 RX ORDER — LISINOPRIL 10 MG/1
10 TABLET ORAL DAILY
Qty: 90 TABLET | Refills: 1 | Status: SHIPPED | OUTPATIENT
Start: 2024-09-05

## 2024-09-19 DIAGNOSIS — E11.65 TYPE 2 DIABETES MELLITUS WITH HYPERGLYCEMIA, WITH LONG-TERM CURRENT USE OF INSULIN (HCC): ICD-10-CM

## 2024-09-19 DIAGNOSIS — Z79.4 TYPE 2 DIABETES MELLITUS WITH HYPERGLYCEMIA, WITH LONG-TERM CURRENT USE OF INSULIN (HCC): ICD-10-CM

## 2024-09-19 RX ORDER — INSULIN GLARGINE 100 [IU]/ML
INJECTION, SOLUTION SUBCUTANEOUS
Qty: 15 ML | Refills: 0 | Status: SHIPPED | OUTPATIENT
Start: 2024-09-19

## 2024-09-19 RX ORDER — TIRZEPATIDE 10 MG/.5ML
INJECTION, SOLUTION SUBCUTANEOUS
Qty: 4 ML | Refills: 0 | Status: SHIPPED | OUTPATIENT
Start: 2024-09-19

## 2024-10-14 DIAGNOSIS — Z79.4 TYPE 2 DIABETES MELLITUS WITH HYPERGLYCEMIA, WITH LONG-TERM CURRENT USE OF INSULIN (HCC): ICD-10-CM

## 2024-10-14 DIAGNOSIS — E11.65 TYPE 2 DIABETES MELLITUS WITH HYPERGLYCEMIA, WITH LONG-TERM CURRENT USE OF INSULIN (HCC): ICD-10-CM

## 2024-10-14 RX ORDER — TIRZEPATIDE 10 MG/.5ML
INJECTION, SOLUTION SUBCUTANEOUS
Qty: 4 ML | Refills: 0 | Status: SHIPPED | OUTPATIENT
Start: 2024-10-14

## 2024-10-14 RX ORDER — PEN NEEDLE, DIABETIC 31 GX5/16"
NEEDLE, DISPOSABLE MISCELLANEOUS
Qty: 200 EACH | Refills: 0 | Status: SHIPPED | OUTPATIENT
Start: 2024-10-14

## 2024-10-14 RX ORDER — INSULIN GLARGINE 100 [IU]/ML
INJECTION, SOLUTION SUBCUTANEOUS
Qty: 15 ML | Refills: 0 | Status: SHIPPED | OUTPATIENT
Start: 2024-10-14

## 2024-10-18 DIAGNOSIS — Z79.4 TYPE 2 DIABETES MELLITUS WITH HYPERGLYCEMIA, WITH LONG-TERM CURRENT USE OF INSULIN (HCC): ICD-10-CM

## 2024-10-18 DIAGNOSIS — E11.65 TYPE 2 DIABETES MELLITUS WITH HYPERGLYCEMIA, WITH LONG-TERM CURRENT USE OF INSULIN (HCC): ICD-10-CM

## 2024-10-18 RX ORDER — INSULIN ASPART 100 [IU]/ML
INJECTION, SOLUTION INTRAVENOUS; SUBCUTANEOUS
Qty: 5 ADJUSTABLE DOSE PRE-FILLED PEN SYRINGE | Refills: 2 | Status: SHIPPED | OUTPATIENT
Start: 2024-10-18 | End: 2024-10-18 | Stop reason: SDUPTHER

## 2024-10-20 RX ORDER — INSULIN ASPART 100 [IU]/ML
INJECTION, SOLUTION INTRAVENOUS; SUBCUTANEOUS
Qty: 5 ADJUSTABLE DOSE PRE-FILLED PEN SYRINGE | Refills: 2 | Status: SHIPPED | OUTPATIENT
Start: 2024-10-20

## 2024-11-10 DIAGNOSIS — Z79.4 TYPE 2 DIABETES MELLITUS WITH HYPERGLYCEMIA, WITH LONG-TERM CURRENT USE OF INSULIN (HCC): ICD-10-CM

## 2024-11-10 DIAGNOSIS — E11.65 TYPE 2 DIABETES MELLITUS WITH HYPERGLYCEMIA, WITH LONG-TERM CURRENT USE OF INSULIN (HCC): ICD-10-CM

## 2024-11-10 RX ORDER — TIRZEPATIDE 10 MG/.5ML
10 INJECTION, SOLUTION SUBCUTANEOUS
Qty: 2 ML | Refills: 3 | Status: SHIPPED | OUTPATIENT
Start: 2024-11-10

## 2024-11-11 RX ORDER — METFORMIN HYDROCHLORIDE 500 MG/1
1000 TABLET, EXTENDED RELEASE ORAL 2 TIMES DAILY WITH MEALS
Qty: 360 TABLET | Refills: 0 | Status: SHIPPED | OUTPATIENT
Start: 2024-11-11

## 2024-11-24 DIAGNOSIS — Z79.4 TYPE 2 DIABETES MELLITUS WITH HYPERGLYCEMIA, WITH LONG-TERM CURRENT USE OF INSULIN (HCC): ICD-10-CM

## 2024-11-24 DIAGNOSIS — E11.65 TYPE 2 DIABETES MELLITUS WITH HYPERGLYCEMIA, WITH LONG-TERM CURRENT USE OF INSULIN (HCC): ICD-10-CM

## 2024-11-25 RX ORDER — INSULIN GLARGINE 100 [IU]/ML
INJECTION, SOLUTION SUBCUTANEOUS
Qty: 15 ML | Refills: 0 | Status: SHIPPED | OUTPATIENT
Start: 2024-11-25

## 2024-12-22 DIAGNOSIS — Z79.4 TYPE 2 DIABETES MELLITUS WITH HYPERGLYCEMIA, WITH LONG-TERM CURRENT USE OF INSULIN (HCC): ICD-10-CM

## 2024-12-22 DIAGNOSIS — E11.65 TYPE 2 DIABETES MELLITUS WITH HYPERGLYCEMIA, WITH LONG-TERM CURRENT USE OF INSULIN (HCC): ICD-10-CM

## 2024-12-23 RX ORDER — INSULIN GLARGINE 100 [IU]/ML
INJECTION, SOLUTION SUBCUTANEOUS
Qty: 15 ML | Refills: 0 | Status: SHIPPED | OUTPATIENT
Start: 2024-12-23

## 2024-12-23 RX ORDER — ACYCLOVIR 400 MG/1
TABLET ORAL
Qty: 3 EACH | Refills: 0 | Status: SHIPPED | OUTPATIENT
Start: 2024-12-23

## 2025-01-07 ENCOUNTER — TELEMEDICINE (OUTPATIENT)
Age: 50
End: 2025-01-07
Payer: COMMERCIAL

## 2025-01-07 DIAGNOSIS — I10 PRIMARY HYPERTENSION: ICD-10-CM

## 2025-01-07 DIAGNOSIS — E11.65 TYPE 2 DIABETES MELLITUS WITH HYPERGLYCEMIA, WITH LONG-TERM CURRENT USE OF INSULIN (HCC): Primary | ICD-10-CM

## 2025-01-07 DIAGNOSIS — Z79.4 TYPE 2 DIABETES MELLITUS WITH HYPERGLYCEMIA, WITH LONG-TERM CURRENT USE OF INSULIN (HCC): Primary | ICD-10-CM

## 2025-01-07 PROCEDURE — 95251 CONT GLUC MNTR ANALYSIS I&R: CPT | Performed by: INTERNAL MEDICINE

## 2025-01-07 PROCEDURE — G2211 COMPLEX E/M VISIT ADD ON: HCPCS | Performed by: INTERNAL MEDICINE

## 2025-01-07 PROCEDURE — 99214 OFFICE O/P EST MOD 30 MIN: CPT | Performed by: INTERNAL MEDICINE

## 2025-01-07 RX ORDER — TIRZEPATIDE 15 MG/.5ML
INJECTION, SOLUTION SUBCUTANEOUS
Qty: 2 ML | Refills: 5 | Status: SHIPPED | OUTPATIENT
Start: 2025-01-07 | End: 2025-01-07 | Stop reason: DRUGHIGH

## 2025-01-07 RX ORDER — TIRZEPATIDE 12.5 MG/.5ML
INJECTION, SOLUTION SUBCUTANEOUS
Qty: 2 ML | Refills: 1 | Status: SHIPPED | OUTPATIENT
Start: 2025-01-07

## 2025-01-07 NOTE — PROGRESS NOTES
Chief Complaint   Patient presents with    Diabetes       Keri An, was evaluated through a synchronous (real-time) audio-video encounter. The patient (or guardian if applicable) is aware that this is a billable service, which includes applicable co-pays. This Virtual Visit was conducted with patient's (and/or legal guardian's) consent. Patient identification was verified, and a caregiver was present when appropriate.   The patient was located at Home: [address] [place] [address] A  [facility] [id number]  Provider was located at Facility (Appt Dept): [address] [place] [address] [address]  [place] [address]  Confirm you are appropriately licensed, registered, or certified to deliver care in the state where the patient is located as indicated above. If you are not or unsure, please re-schedule the visit: Yes, I confirm.     Patient was last seen: 6 months ago 6/24/2024        General:   No new issues     Did not do PDH    Has G7 - Phone not compatible     [Commercial, has deductible $3000]    A1c: last a1c was 7.9     DM Medications:    Mounjaro 12.5mg once a week   Metformin ER 500mg 2 BID  Lantus  30 units - HS - 9-10 (has tried to lower and not happy with numbers so went back up)  Novolog - 2u if > 150, 4u if > 250, 8u if > 350 -rare use    Last Changes: : no recent changes    Sugar Checks: checks more than 4 times a day and uses Dexcom CGM         AM: reports:      PM: reports:    2 weeks          LOWs:  denies low sugars     DIET: feels does well with diabetic diet, has received diabetic meal training, in the past     EXERCISE: exercise limited due to pain  had tendonitis in foot     HTN: on ACE-I, HTN followed by PCP     LIPIDS: on no meds, lipids followed by PCP    RENAL: has normal renal function, **has positive BENEDICTO-r** , in the past year, is on an ACE-I     EYES: has no retinopathy, overdue for eye exam     DENTAL:  overdue for dentist     FEET: has no current issues, no numbness or tingling     HEART:  no

## 2025-01-26 DIAGNOSIS — E11.65 TYPE 2 DIABETES MELLITUS WITH HYPERGLYCEMIA, WITH LONG-TERM CURRENT USE OF INSULIN (HCC): ICD-10-CM

## 2025-01-26 DIAGNOSIS — Z79.4 TYPE 2 DIABETES MELLITUS WITH HYPERGLYCEMIA, WITH LONG-TERM CURRENT USE OF INSULIN (HCC): ICD-10-CM

## 2025-01-27 ENCOUNTER — TELEPHONE (OUTPATIENT)
Age: 50
End: 2025-01-27

## 2025-01-27 RX ORDER — INSULIN GLARGINE 100 [IU]/ML
INJECTION, SOLUTION SUBCUTANEOUS
Qty: 15 ML | Refills: 3 | Status: SHIPPED | OUTPATIENT
Start: 2025-01-27

## 2025-01-27 RX ORDER — ACYCLOVIR 400 MG/1
TABLET ORAL
Qty: 3 EACH | Refills: 3 | Status: SHIPPED | OUTPATIENT
Start: 2025-01-27

## 2025-02-09 RX ORDER — METFORMIN HYDROCHLORIDE 500 MG/1
1000 TABLET, EXTENDED RELEASE ORAL 2 TIMES DAILY WITH MEALS
Qty: 360 TABLET | Refills: 1 | Status: SHIPPED | OUTPATIENT
Start: 2025-02-09

## 2025-03-05 DIAGNOSIS — E11.65 TYPE 2 DIABETES MELLITUS WITH HYPERGLYCEMIA, WITH LONG-TERM CURRENT USE OF INSULIN (HCC): ICD-10-CM

## 2025-03-05 DIAGNOSIS — Z79.4 TYPE 2 DIABETES MELLITUS WITH HYPERGLYCEMIA, WITH LONG-TERM CURRENT USE OF INSULIN (HCC): ICD-10-CM

## 2025-03-05 RX ORDER — TIRZEPATIDE 12.5 MG/.5ML
INJECTION, SOLUTION SUBCUTANEOUS
Qty: 4 ML | Refills: 0 | Status: SHIPPED | OUTPATIENT
Start: 2025-03-05

## 2025-03-05 RX ORDER — LISINOPRIL 10 MG/1
TABLET ORAL
Qty: 90 TABLET | Refills: 0 | Status: SHIPPED | OUTPATIENT
Start: 2025-03-05

## 2025-03-31 DIAGNOSIS — E11.65 TYPE 2 DIABETES MELLITUS WITH HYPERGLYCEMIA, WITH LONG-TERM CURRENT USE OF INSULIN (HCC): ICD-10-CM

## 2025-03-31 DIAGNOSIS — Z79.4 TYPE 2 DIABETES MELLITUS WITH HYPERGLYCEMIA, WITH LONG-TERM CURRENT USE OF INSULIN (HCC): ICD-10-CM

## 2025-04-01 RX ORDER — PEN NEEDLE, DIABETIC 31 GX5/16"
NEEDLE, DISPOSABLE MISCELLANEOUS
Qty: 200 EACH | Refills: 0 | Status: SHIPPED | OUTPATIENT
Start: 2025-04-01

## 2025-04-01 RX ORDER — TIRZEPATIDE 12.5 MG/.5ML
INJECTION, SOLUTION SUBCUTANEOUS
Qty: 4 ML | Refills: 0 | Status: SHIPPED | OUTPATIENT
Start: 2025-04-01

## 2025-04-29 DIAGNOSIS — E11.65 TYPE 2 DIABETES MELLITUS WITH HYPERGLYCEMIA, WITH LONG-TERM CURRENT USE OF INSULIN (HCC): ICD-10-CM

## 2025-04-29 DIAGNOSIS — Z79.4 TYPE 2 DIABETES MELLITUS WITH HYPERGLYCEMIA, WITH LONG-TERM CURRENT USE OF INSULIN (HCC): ICD-10-CM

## 2025-04-30 RX ORDER — TIRZEPATIDE 12.5 MG/.5ML
INJECTION, SOLUTION SUBCUTANEOUS
Qty: 4 ML | Refills: 0 | Status: SHIPPED | OUTPATIENT
Start: 2025-04-30

## 2025-05-16 ENCOUNTER — OFFICE VISIT (OUTPATIENT)
Age: 50
End: 2025-05-16

## 2025-05-16 VITALS
WEIGHT: 292 LBS | DIASTOLIC BLOOD PRESSURE: 80 MMHG | BODY MASS INDEX: 43.25 KG/M2 | HEIGHT: 69 IN | SYSTOLIC BLOOD PRESSURE: 143 MMHG | HEART RATE: 78 BPM

## 2025-05-16 DIAGNOSIS — Z79.4 TYPE 2 DIABETES MELLITUS WITH HYPERGLYCEMIA, WITH LONG-TERM CURRENT USE OF INSULIN (HCC): ICD-10-CM

## 2025-05-16 DIAGNOSIS — E11.65 TYPE 2 DIABETES MELLITUS WITH HYPERGLYCEMIA, WITH LONG-TERM CURRENT USE OF INSULIN (HCC): Primary | ICD-10-CM

## 2025-05-16 DIAGNOSIS — I10 PRIMARY HYPERTENSION: ICD-10-CM

## 2025-05-16 DIAGNOSIS — E11.65 TYPE 2 DIABETES MELLITUS WITH HYPERGLYCEMIA, WITH LONG-TERM CURRENT USE OF INSULIN (HCC): ICD-10-CM

## 2025-05-16 DIAGNOSIS — Z79.4 TYPE 2 DIABETES MELLITUS WITH HYPERGLYCEMIA, WITH LONG-TERM CURRENT USE OF INSULIN (HCC): Primary | ICD-10-CM

## 2025-05-16 RX ORDER — METFORMIN HYDROCHLORIDE 500 MG/1
1000 TABLET, EXTENDED RELEASE ORAL 2 TIMES DAILY WITH MEALS
Qty: 360 TABLET | Refills: 1 | Status: SHIPPED | OUTPATIENT
Start: 2025-05-16

## 2025-05-16 RX ORDER — ACYCLOVIR 400 MG/1
TABLET ORAL
Qty: 3 EACH | Refills: 11 | Status: SHIPPED | OUTPATIENT
Start: 2025-05-16

## 2025-05-16 RX ORDER — PEN NEEDLE, DIABETIC 31 GX5/16"
NEEDLE, DISPOSABLE MISCELLANEOUS
Qty: 30 EACH | Refills: 11 | Status: SHIPPED | OUTPATIENT
Start: 2025-05-16

## 2025-05-16 RX ORDER — TIRZEPATIDE 12.5 MG/.5ML
INJECTION, SOLUTION SUBCUTANEOUS
Qty: 2 ML | Refills: 11 | Status: SHIPPED | OUTPATIENT
Start: 2025-05-16

## 2025-05-16 RX ORDER — INSULIN GLARGINE 100 [IU]/ML
INJECTION, SOLUTION SUBCUTANEOUS
Qty: 15 ML | Refills: 11 | Status: SHIPPED | OUTPATIENT
Start: 2025-05-16

## 2025-05-16 NOTE — PROGRESS NOTES
Chief Complaint   Patient presents with    Diabetes         Patient was last seen: 6 months ago 1/7/2025         General:   No new issues     Did not do PDH      [Commercial, has deductible $3000]    A1c: no recent a1c, current  GMI 6.6     DM Medications:    Mounjaro 12.5mg once a week   Metformin ER 500mg 2 BID  Lantus  20 units - HS - 9-10 (has tried to lower and not happy with numbers so went back up)  Novolog - 2u if > 150, 4u if > 250, 8u if > 350 -rare use    Last Changes: :lowered insulin     Sugar Checks: checks more than 4 times a day and uses Dexcom CGM         AM: reports:      PM: reports:              LOWs:  denies low sugars     DIET: feels does well with diabetic diet, has received diabetic meal training, in the past     EXERCISE: exercise limited due to pain      HTN: on ACE-I, HTN followed by PCP     LIPIDS: on no meds, lipids followed by PCP    RENAL: has normal renal function, **has positive BENEDICTO-r** , in the past year, is on an ACE-I     EYES: has no retinopathy, overdue for eye exam     DENTAL:  overdue for dentist     FEET: has no current issues, no numbness or tingling     HEART:  no chest pain, shortness of breath or claudication, has no cardiac history     ASA:  does not take aspirin or other blood thinner     SYMPTOMS: no polyuria, thirst or blurred vision     THYROID: no known thyroid issue    DIABETES HISTORY:   From initial visit: 9/18/2023    DM2 - 9 years ago   Metformin - taking, no GI issues unless Immediate release (has crohn's disease)  No sulfonylurea, no TZD  Januvia & tradgenta - stopped when insulin added   On jardiance - didn't seem to work  No GLP1  On insulin until DKA in 2/2023    LABS/STUDIES:     Lab Results   Component Value Date/Time    LABA1C 7.9 06/18/2024 10:22 AM    LABA1C 6.8 12/18/2023 12:00 AM    LABA1C 9.2 03/22/2023 04:33 AM    CREATININE 0.75 06/18/2024 10:22 AM    LABGLOM 98 06/18/2024 10:22 AM    LABGLOM >60 03/22/2023 04:33 AM    ALBCREAT 34 06/18/2024

## 2025-05-31 LAB
ALBUMIN/CREAT UR: <11 MG/G CREAT (ref 0–29)
CREAT SERPL-MCNC: 0.76 MG/DL (ref 0.57–1)
CREAT UR-MCNC: 27.9 MG/DL
EGFRCR SERPLBLD CKD-EPI 2021: 95 ML/MIN/1.73
HBA1C MFR BLD: 6.5 % (ref 4.8–5.6)
MICROALBUMIN UR-MCNC: <3 UG/ML

## 2025-06-01 ENCOUNTER — RESULTS FOLLOW-UP (OUTPATIENT)
Age: 50
End: 2025-06-01

## 2025-06-06 RX ORDER — LISINOPRIL 10 MG/1
TABLET ORAL
Qty: 90 TABLET | Refills: 3 | Status: SHIPPED | OUTPATIENT
Start: 2025-06-06

## 2025-07-07 DIAGNOSIS — E11.65 TYPE 2 DIABETES MELLITUS WITH HYPERGLYCEMIA, WITH LONG-TERM CURRENT USE OF INSULIN (HCC): ICD-10-CM

## 2025-07-07 DIAGNOSIS — Z79.4 TYPE 2 DIABETES MELLITUS WITH HYPERGLYCEMIA, WITH LONG-TERM CURRENT USE OF INSULIN (HCC): ICD-10-CM

## 2025-08-08 DIAGNOSIS — E11.65 TYPE 2 DIABETES MELLITUS WITH HYPERGLYCEMIA, WITH LONG-TERM CURRENT USE OF INSULIN (HCC): ICD-10-CM

## 2025-08-08 DIAGNOSIS — Z79.4 TYPE 2 DIABETES MELLITUS WITH HYPERGLYCEMIA, WITH LONG-TERM CURRENT USE OF INSULIN (HCC): ICD-10-CM

## 2025-08-08 RX ORDER — INSULIN GLARGINE 100 [IU]/ML
INJECTION, SOLUTION SUBCUTANEOUS
Qty: 15 ML | Refills: 10 | Status: SHIPPED | OUTPATIENT
Start: 2025-08-08

## (undated) DEVICE — GLOVE SURG SZ 7 L12IN FNGR THK79MIL GRN LTX FREE

## (undated) DEVICE — BASIC SINGLE BASIN BTC-LF: Brand: MEDLINE INDUSTRIES, INC.

## (undated) DEVICE — SNAP KOVER: Brand: UNBRANDED

## (undated) DEVICE — PAD ABD CARING 8X10 STRL LF --

## (undated) DEVICE — DRESSING,GAUZE,XEROFORM,CURAD,5"X9",ST: Brand: CURAD

## (undated) DEVICE — DRILL,  2.4 X 160MM, SOLID, MEASURING, LONG, AO: Brand: BABY GORILLA®/GORILLA® PLATING SYSTEM

## (undated) DEVICE — 3.5 X 12 MM R3CON LOCKING PLATE SCREW
Type: IMPLANTABLE DEVICE | Site: ANKLE | Status: NON-FUNCTIONAL
Brand: GORILLA PLATING SYSTEM
Removed: 2023-03-21

## (undated) DEVICE — DISPOSABLES KIT FOR DX KNOTLESS FIBERTAK

## (undated) DEVICE — TUBING SUCT 12FR MAL ALUM SHFT FN CAP VENT UNIV CONN W/ OBT

## (undated) DEVICE — INTENDED FOR TISSUE SEPARATION, AND OTHER PROCEDURES THAT REQUIRE A SHARP SURGICAL BLADE TO PUNCTURE OR CUT.: Brand: BARD-PARKER ® CARBON RIB-BACK BLADES

## (undated) DEVICE — EXTREMITY-MRMC: Brand: MEDLINE INDUSTRIES, INC.

## (undated) DEVICE — OLIVE WIRE, SMOOTH, 1.4MM: Brand: BABY GORILLA/GORILLA PLATING SYSTEM

## (undated) DEVICE — C-ARMOR C-ARM EQUIPMENT COVERS CLEAR STERILE UNIVERSAL FIT 12 PER CASE: Brand: C-ARMOR

## (undated) DEVICE — Ø3.5 X 13CM SOLID OVER DRILL: Brand: BABY GORILLA®/GORILLA® PLATING SYSTEM

## (undated) DEVICE — DRAPE,REIN 53X77,STERILE: Brand: MEDLINE

## (undated) DEVICE — BANDAGE COMPR M W6INXL10YD WHT BGE VELC E MTRX HK AND LOOP

## (undated) DEVICE — PREP SKN CHLRAPRP APL 26ML STR --

## (undated) DEVICE — PADDING CAST SPEC 6INX4YD COT --

## (undated) DEVICE — SUTURE MCRYL SZ 3-0 L27IN ABSRB UD L19MM PS-2 3/8 CIR PRIM Y427H

## (undated) DEVICE — GLOVE SURG SZ 65 THK91MIL LTX FREE SYN POLYISOPRENE

## (undated) DEVICE — DRILL,  2.8 X 160MM, SOLID, MEASURING, LONG, AO: Brand: BABY GORILLA®/GORILLA® PLATING SYSTEM

## (undated) DEVICE — SOLUTION IRRIG 1000ML 0.9% SOD CHL USP POUR PLAS BTL

## (undated) DEVICE — ZIMMER® STERILE DISPOSABLE TOURNIQUET CUFF WITH PLC, DUAL PORT, SINGLE BLADDER, 34 IN. (86 CM)